# Patient Record
Sex: MALE | Race: WHITE | NOT HISPANIC OR LATINO | Employment: OTHER | URBAN - METROPOLITAN AREA
[De-identification: names, ages, dates, MRNs, and addresses within clinical notes are randomized per-mention and may not be internally consistent; named-entity substitution may affect disease eponyms.]

---

## 2017-04-03 ENCOUNTER — GENERIC CONVERSION - ENCOUNTER (OUTPATIENT)
Dept: OTHER | Facility: OTHER | Age: 52
End: 2017-04-03

## 2017-04-03 ENCOUNTER — APPOINTMENT (EMERGENCY)
Dept: RADIOLOGY | Facility: HOSPITAL | Age: 52
DRG: 251 | End: 2017-04-03
Payer: COMMERCIAL

## 2017-04-03 ENCOUNTER — HOSPITAL ENCOUNTER (INPATIENT)
Facility: HOSPITAL | Age: 52
LOS: 1 days | Discharge: HOME | DRG: 251 | End: 2017-04-06
Attending: EMERGENCY MEDICINE | Admitting: INTERNAL MEDICINE
Payer: COMMERCIAL

## 2017-04-03 DIAGNOSIS — R42 DIZZINESS: ICD-10-CM

## 2017-04-03 DIAGNOSIS — I49.8 BRUGADA SYNDROME: ICD-10-CM

## 2017-04-03 DIAGNOSIS — R06.00 DYSPNEA ON EXERTION: ICD-10-CM

## 2017-04-03 DIAGNOSIS — R06.02 SOB (SHORTNESS OF BREATH) ON EXERTION: Primary | ICD-10-CM

## 2017-04-03 LAB
ALBUMIN SERPL BCP-MCNC: 3.9 G/DL (ref 3.5–5)
ALP SERPL-CCNC: 78 U/L (ref 46–116)
ALT SERPL W P-5'-P-CCNC: 45 U/L (ref 12–78)
ANION GAP SERPL CALCULATED.3IONS-SCNC: 15 MMOL/L (ref 4–13)
APTT PPP: 29 SECONDS (ref 24–36)
AST SERPL W P-5'-P-CCNC: 33 U/L (ref 5–45)
ATRIAL RATE: 81 BPM
ATRIAL RATE: 82 BPM
BASOPHILS # BLD AUTO: 0.03 THOUSANDS/ΜL (ref 0–0.1)
BASOPHILS NFR BLD AUTO: 0 % (ref 0–1)
BILIRUB SERPL-MCNC: 0.6 MG/DL (ref 0.2–1)
BUN SERPL-MCNC: 12 MG/DL (ref 5–25)
CALCIUM SERPL-MCNC: 9.3 MG/DL (ref 8.3–10.1)
CHLORIDE SERPL-SCNC: 100 MMOL/L (ref 100–108)
CO2 SERPL-SCNC: 22 MMOL/L (ref 21–32)
CREAT SERPL-MCNC: 0.94 MG/DL (ref 0.6–1.3)
EOSINOPHIL # BLD AUTO: 0.09 THOUSAND/ΜL (ref 0–0.61)
EOSINOPHIL NFR BLD AUTO: 1 % (ref 0–6)
ERYTHROCYTE [DISTWIDTH] IN BLOOD BY AUTOMATED COUNT: 12.9 % (ref 11.6–15.1)
GFR SERPL CREATININE-BSD FRML MDRD: >60 ML/MIN/1.73SQ M
GLUCOSE SERPL-MCNC: 103 MG/DL (ref 65–140)
HCT VFR BLD AUTO: 41.2 % (ref 36.5–49.3)
HGB BLD-MCNC: 14.3 G/DL (ref 12–17)
INR PPP: 1.09 (ref 0.86–1.16)
LYMPHOCYTES # BLD AUTO: 2.18 THOUSANDS/ΜL (ref 0.6–4.47)
LYMPHOCYTES NFR BLD AUTO: 26 % (ref 14–44)
MAGNESIUM SERPL-MCNC: 1.9 MG/DL (ref 1.6–2.6)
MCH RBC QN AUTO: 29.2 PG (ref 26.8–34.3)
MCHC RBC AUTO-ENTMCNC: 34.7 G/DL (ref 31.4–37.4)
MCV RBC AUTO: 84 FL (ref 82–98)
MONOCYTES # BLD AUTO: 0.51 THOUSAND/ΜL (ref 0.17–1.22)
MONOCYTES NFR BLD AUTO: 6 % (ref 4–12)
NEUTROPHILS # BLD AUTO: 5.49 THOUSANDS/ΜL (ref 1.85–7.62)
NEUTS SEG NFR BLD AUTO: 67 % (ref 43–75)
NT-PROBNP SERPL-MCNC: 99 PG/ML
P AXIS: 64 DEGREES
P AXIS: 66 DEGREES
PLATELET # BLD AUTO: 351 THOUSANDS/UL (ref 149–390)
PMV BLD AUTO: 9.6 FL (ref 8.9–12.7)
POTASSIUM SERPL-SCNC: 3.8 MMOL/L (ref 3.5–5.3)
PR INTERVAL: 148 MS
PR INTERVAL: 150 MS
PROT SERPL-MCNC: 7.9 G/DL (ref 6.4–8.2)
PROTHROMBIN TIME: 13.9 SECONDS (ref 12–14.3)
QRS AXIS: 25 DEGREES
QRS AXIS: 36 DEGREES
QRSD INTERVAL: 164 MS
QRSD INTERVAL: 164 MS
QT INTERVAL: 398 MS
QT INTERVAL: 418 MS
QTC INTERVAL: 456 MS
QTC INTERVAL: 473 MS
RBC # BLD AUTO: 4.9 MILLION/UL (ref 3.88–5.62)
SODIUM SERPL-SCNC: 137 MMOL/L (ref 136–145)
T WAVE AXIS: -14 DEGREES
T WAVE AXIS: 10 DEGREES
TROPONIN I SERPL-MCNC: <0.02 NG/ML
TROPONIN I SERPL-MCNC: <0.02 NG/ML
TSH SERPL DL<=0.05 MIU/L-ACNC: 1.56 UIU/ML (ref 0.36–3.74)
VENTRICULAR RATE: 77 BPM
VENTRICULAR RATE: 79 BPM
WBC # BLD AUTO: 8.3 THOUSAND/UL (ref 4.31–10.16)

## 2017-04-03 PROCEDURE — 71010 HB CHEST X-RAY 1 VIEW FRONTAL (PORTABLE): CPT

## 2017-04-03 PROCEDURE — 83735 ASSAY OF MAGNESIUM: CPT | Performed by: EMERGENCY MEDICINE

## 2017-04-03 PROCEDURE — 85730 THROMBOPLASTIN TIME PARTIAL: CPT | Performed by: EMERGENCY MEDICINE

## 2017-04-03 PROCEDURE — 84443 ASSAY THYROID STIM HORMONE: CPT | Performed by: EMERGENCY MEDICINE

## 2017-04-03 PROCEDURE — 83880 ASSAY OF NATRIURETIC PEPTIDE: CPT | Performed by: EMERGENCY MEDICINE

## 2017-04-03 PROCEDURE — 93005 ELECTROCARDIOGRAM TRACING: CPT | Performed by: EMERGENCY MEDICINE

## 2017-04-03 PROCEDURE — 80053 COMPREHEN METABOLIC PANEL: CPT | Performed by: EMERGENCY MEDICINE

## 2017-04-03 PROCEDURE — 84484 ASSAY OF TROPONIN QUANT: CPT | Performed by: HOSPITALIST

## 2017-04-03 PROCEDURE — 93005 ELECTROCARDIOGRAM TRACING: CPT

## 2017-04-03 PROCEDURE — 99285 EMERGENCY DEPT VISIT HI MDM: CPT

## 2017-04-03 PROCEDURE — 36415 COLL VENOUS BLD VENIPUNCTURE: CPT | Performed by: EMERGENCY MEDICINE

## 2017-04-03 PROCEDURE — 85025 COMPLETE CBC W/AUTO DIFF WBC: CPT | Performed by: EMERGENCY MEDICINE

## 2017-04-03 PROCEDURE — 85610 PROTHROMBIN TIME: CPT | Performed by: EMERGENCY MEDICINE

## 2017-04-03 PROCEDURE — 84484 ASSAY OF TROPONIN QUANT: CPT | Performed by: EMERGENCY MEDICINE

## 2017-04-03 RX ORDER — LISINOPRIL 20 MG/1
20 TABLET ORAL DAILY
Status: DISCONTINUED | OUTPATIENT
Start: 2017-04-04 | End: 2017-04-05

## 2017-04-03 RX ORDER — ONDANSETRON 2 MG/ML
4 INJECTION INTRAMUSCULAR; INTRAVENOUS EVERY 6 HOURS PRN
Status: DISCONTINUED | OUTPATIENT
Start: 2017-04-03 | End: 2017-04-06 | Stop reason: HOSPADM

## 2017-04-04 ENCOUNTER — GENERIC CONVERSION - ENCOUNTER (OUTPATIENT)
Dept: OTHER | Facility: OTHER | Age: 52
End: 2017-04-04

## 2017-04-04 ENCOUNTER — APPOINTMENT (OUTPATIENT)
Dept: NON INVASIVE DIAGNOSTICS | Facility: HOSPITAL | Age: 52
DRG: 251 | End: 2017-04-04
Payer: COMMERCIAL

## 2017-04-04 LAB
ANION GAP SERPL CALCULATED.3IONS-SCNC: 12 MMOL/L (ref 4–13)
ATRIAL RATE: 66 BPM
BUN SERPL-MCNC: 16 MG/DL (ref 5–25)
CALCIUM SERPL-MCNC: 9.2 MG/DL (ref 8.3–10.1)
CHLORIDE SERPL-SCNC: 103 MMOL/L (ref 100–108)
CHOLEST SERPL-MCNC: 170 MG/DL (ref 50–200)
CO2 SERPL-SCNC: 23 MMOL/L (ref 21–32)
CREAT SERPL-MCNC: 1.04 MG/DL (ref 0.6–1.3)
ERYTHROCYTE [DISTWIDTH] IN BLOOD BY AUTOMATED COUNT: 13.1 % (ref 11.6–15.1)
EST. AVERAGE GLUCOSE BLD GHB EST-MCNC: 123 MG/DL
GFR SERPL CREATININE-BSD FRML MDRD: >60 ML/MIN/1.73SQ M
GLUCOSE P FAST SERPL-MCNC: 105 MG/DL (ref 65–99)
GLUCOSE SERPL-MCNC: 105 MG/DL (ref 65–140)
HBA1C MFR BLD: 5.9 % (ref 4.2–6.3)
HCT VFR BLD AUTO: 41.8 % (ref 36.5–49.3)
HDLC SERPL-MCNC: 33 MG/DL (ref 40–60)
HGB BLD-MCNC: 13.9 G/DL (ref 12–17)
LDLC SERPL CALC-MCNC: 113 MG/DL (ref 0–100)
MAGNESIUM SERPL-MCNC: 2.3 MG/DL (ref 1.6–2.6)
MCH RBC QN AUTO: 28.7 PG (ref 26.8–34.3)
MCHC RBC AUTO-ENTMCNC: 33.3 G/DL (ref 31.4–37.4)
MCV RBC AUTO: 86 FL (ref 82–98)
P AXIS: 43 DEGREES
PLATELET # BLD AUTO: 344 THOUSANDS/UL (ref 149–390)
PLATELET # BLD AUTO: 346 THOUSANDS/UL (ref 149–390)
PMV BLD AUTO: 9.5 FL (ref 8.9–12.7)
PMV BLD AUTO: 9.9 FL (ref 8.9–12.7)
POTASSIUM SERPL-SCNC: 4.1 MMOL/L (ref 3.5–5.3)
PR INTERVAL: 162 MS
QRS AXIS: 27 DEGREES
QRSD INTERVAL: 160 MS
QT INTERVAL: 448 MS
QTC INTERVAL: 469 MS
RBC # BLD AUTO: 4.84 MILLION/UL (ref 3.88–5.62)
SODIUM SERPL-SCNC: 138 MMOL/L (ref 136–145)
T WAVE AXIS: 22 DEGREES
TRIGL SERPL-MCNC: 118 MG/DL
TROPONIN I SERPL-MCNC: <0.02 NG/ML
VENTRICULAR RATE: 66 BPM
WBC # BLD AUTO: 7.17 THOUSAND/UL (ref 4.31–10.16)

## 2017-04-04 PROCEDURE — 84484 ASSAY OF TROPONIN QUANT: CPT | Performed by: HOSPITALIST

## 2017-04-04 PROCEDURE — 93005 ELECTROCARDIOGRAM TRACING: CPT | Performed by: HOSPITALIST

## 2017-04-04 PROCEDURE — A9270 NON-COVERED ITEM OR SERVICE: HCPCS | Performed by: HOSPITALIST

## 2017-04-04 PROCEDURE — 93306 TTE W/DOPPLER COMPLETE: CPT

## 2017-04-04 PROCEDURE — 80048 BASIC METABOLIC PNL TOTAL CA: CPT | Performed by: HOSPITALIST

## 2017-04-04 PROCEDURE — 83735 ASSAY OF MAGNESIUM: CPT | Performed by: HOSPITALIST

## 2017-04-04 PROCEDURE — 85049 AUTOMATED PLATELET COUNT: CPT | Performed by: HOSPITALIST

## 2017-04-04 PROCEDURE — 85027 COMPLETE CBC AUTOMATED: CPT | Performed by: HOSPITALIST

## 2017-04-04 PROCEDURE — A9270 NON-COVERED ITEM OR SERVICE: HCPCS | Performed by: INTERNAL MEDICINE

## 2017-04-04 PROCEDURE — 83036 HEMOGLOBIN GLYCOSYLATED A1C: CPT | Performed by: HOSPITALIST

## 2017-04-04 PROCEDURE — 80061 LIPID PANEL: CPT | Performed by: HOSPITALIST

## 2017-04-04 RX ORDER — ASPIRIN 81 MG/1
162 TABLET, CHEWABLE ORAL ONCE
Status: COMPLETED | OUTPATIENT
Start: 2017-04-04 | End: 2017-04-04

## 2017-04-04 RX ORDER — ASPIRIN 325 MG
325 TABLET ORAL DAILY
Status: DISCONTINUED | OUTPATIENT
Start: 2017-04-05 | End: 2017-04-05

## 2017-04-04 RX ADMIN — LISINOPRIL 20 MG: 20 TABLET ORAL at 08:09

## 2017-04-04 RX ADMIN — ENOXAPARIN SODIUM 40 MG: 40 INJECTION SUBCUTANEOUS at 08:09

## 2017-04-04 RX ADMIN — ASPIRIN 162 MG: 81 TABLET, CHEWABLE ORAL at 17:43

## 2017-04-05 ENCOUNTER — APPOINTMENT (OUTPATIENT)
Dept: NON INVASIVE DIAGNOSTICS | Facility: HOSPITAL | Age: 52
DRG: 251 | End: 2017-04-05
Payer: COMMERCIAL

## 2017-04-05 LAB
KCT BLD-ACNC: 240 SEC (ref 89–137)
SPECIMEN SOURCE: ABNORMAL

## 2017-04-05 PROCEDURE — C1769 GUIDE WIRE: HCPCS | Performed by: NURSE PRACTITIONER

## 2017-04-05 PROCEDURE — 4A023N7 MEASUREMENT OF CARDIAC SAMPLING AND PRESSURE, LEFT HEART, PERCUTANEOUS APPROACH: ICD-10-PCS | Performed by: INTERNAL MEDICINE

## 2017-04-05 PROCEDURE — C1887 CATHETER, GUIDING: HCPCS | Performed by: NURSE PRACTITIONER

## 2017-04-05 PROCEDURE — B2111ZZ FLUOROSCOPY OF MULTIPLE CORONARY ARTERIES USING LOW OSMOLAR CONTRAST: ICD-10-PCS | Performed by: INTERNAL MEDICINE

## 2017-04-05 PROCEDURE — 02703ZZ DILATION OF CORONARY ARTERY, ONE ARTERY, PERCUTANEOUS APPROACH: ICD-10-PCS | Performed by: INTERNAL MEDICINE

## 2017-04-05 PROCEDURE — A9270 NON-COVERED ITEM OR SERVICE: HCPCS | Performed by: INTERNAL MEDICINE

## 2017-04-05 PROCEDURE — 85347 COAGULATION TIME ACTIVATED: CPT

## 2017-04-05 PROCEDURE — 99153 MOD SED SAME PHYS/QHP EA: CPT | Performed by: NURSE PRACTITIONER

## 2017-04-05 PROCEDURE — C1894 INTRO/SHEATH, NON-LASER: HCPCS | Performed by: NURSE PRACTITIONER

## 2017-04-05 PROCEDURE — 93458 L HRT ARTERY/VENTRICLE ANGIO: CPT | Performed by: NURSE PRACTITIONER

## 2017-04-05 PROCEDURE — 99152 MOD SED SAME PHYS/QHP 5/>YRS: CPT | Performed by: NURSE PRACTITIONER

## 2017-04-05 PROCEDURE — A9270 NON-COVERED ITEM OR SERVICE: HCPCS | Performed by: NURSE PRACTITIONER

## 2017-04-05 RX ORDER — ASPIRIN 81 MG/1
81 TABLET, CHEWABLE ORAL DAILY
Status: DISCONTINUED | OUTPATIENT
Start: 2017-04-06 | End: 2017-04-06 | Stop reason: HOSPADM

## 2017-04-05 RX ORDER — METOPROLOL SUCCINATE 25 MG/1
25 TABLET, EXTENDED RELEASE ORAL DAILY
Status: DISCONTINUED | OUTPATIENT
Start: 2017-04-06 | End: 2017-04-06 | Stop reason: HOSPADM

## 2017-04-05 RX ORDER — MIDAZOLAM HYDROCHLORIDE 1 MG/ML
INJECTION INTRAMUSCULAR; INTRAVENOUS CODE/TRAUMA/SEDATION MEDICATION
Status: COMPLETED | OUTPATIENT
Start: 2017-04-05 | End: 2017-04-05

## 2017-04-05 RX ORDER — PRASUGREL 10 MG/1
TABLET, FILM COATED ORAL CODE/TRAUMA/SEDATION MEDICATION
Status: COMPLETED | OUTPATIENT
Start: 2017-04-05 | End: 2017-04-05

## 2017-04-05 RX ORDER — ATORVASTATIN CALCIUM 40 MG/1
40 TABLET, FILM COATED ORAL
Status: DISCONTINUED | OUTPATIENT
Start: 2017-04-05 | End: 2017-04-06 | Stop reason: HOSPADM

## 2017-04-05 RX ORDER — HEPARIN SODIUM 1000 [USP'U]/ML
INJECTION, SOLUTION INTRAVENOUS; SUBCUTANEOUS CODE/TRAUMA/SEDATION MEDICATION
Status: COMPLETED | OUTPATIENT
Start: 2017-04-05 | End: 2017-04-05

## 2017-04-05 RX ORDER — SODIUM CHLORIDE 9 MG/ML
75 INJECTION, SOLUTION INTRAVENOUS CONTINUOUS
Status: DISCONTINUED | OUTPATIENT
Start: 2017-04-05 | End: 2017-04-06

## 2017-04-05 RX ORDER — SODIUM CHLORIDE 9 MG/ML
INJECTION, SOLUTION INTRAVENOUS
Status: COMPLETED | OUTPATIENT
Start: 2017-04-05 | End: 2017-04-05

## 2017-04-05 RX ORDER — ASPIRIN 325 MG
TABLET, DELAYED RELEASE (ENTERIC COATED) ORAL CODE/TRAUMA/SEDATION MEDICATION
Status: COMPLETED | OUTPATIENT
Start: 2017-04-05 | End: 2017-04-05

## 2017-04-05 RX ORDER — LIDOCAINE HYDROCHLORIDE 10 MG/ML
INJECTION, SOLUTION INFILTRATION; PERINEURAL CODE/TRAUMA/SEDATION MEDICATION
Status: COMPLETED | OUTPATIENT
Start: 2017-04-05 | End: 2017-04-05

## 2017-04-05 RX ORDER — NITROGLYCERIN 20 MG/100ML
INJECTION INTRAVENOUS CODE/TRAUMA/SEDATION MEDICATION
Status: COMPLETED | OUTPATIENT
Start: 2017-04-05 | End: 2017-04-05

## 2017-04-05 RX ORDER — FENTANYL CITRATE 50 UG/ML
INJECTION, SOLUTION INTRAMUSCULAR; INTRAVENOUS CODE/TRAUMA/SEDATION MEDICATION
Status: COMPLETED | OUTPATIENT
Start: 2017-04-05 | End: 2017-04-05

## 2017-04-05 RX ORDER — LISINOPRIL 10 MG/1
10 TABLET ORAL DAILY
Status: DISCONTINUED | OUTPATIENT
Start: 2017-04-06 | End: 2017-04-06 | Stop reason: HOSPADM

## 2017-04-05 RX ORDER — VERAPAMIL HYDROCHLORIDE 2.5 MG/ML
INJECTION, SOLUTION INTRAVENOUS CODE/TRAUMA/SEDATION MEDICATION
Status: COMPLETED | OUTPATIENT
Start: 2017-04-05 | End: 2017-04-05

## 2017-04-05 RX ADMIN — VERAPAMIL HYDROCHLORIDE 2.5 MG: 2.5 INJECTION, SOLUTION INTRAVENOUS at 10:08

## 2017-04-05 RX ADMIN — FENTANYL CITRATE 50 MCG: 50 INJECTION, SOLUTION INTRAMUSCULAR; INTRAVENOUS at 10:37

## 2017-04-05 RX ADMIN — MIDAZOLAM HYDROCHLORIDE 2 MG: 1 INJECTION, SOLUTION INTRAMUSCULAR; INTRAVENOUS at 10:04

## 2017-04-05 RX ADMIN — MIDAZOLAM HYDROCHLORIDE 1 MG: 1 INJECTION, SOLUTION INTRAMUSCULAR; INTRAVENOUS at 10:35

## 2017-04-05 RX ADMIN — ATORVASTATIN CALCIUM 40 MG: 40 TABLET, FILM COATED ORAL at 18:08

## 2017-04-05 RX ADMIN — NITROGLYCERIN 1 INCH: 20 OINTMENT TOPICAL at 10:22

## 2017-04-05 RX ADMIN — ASPIRIN 324 MG: 325 TABLET, DELAYED RELEASE ORAL at 09:56

## 2017-04-05 RX ADMIN — NITROGLYCERIN 200 MCG: 20 INJECTION INTRAVENOUS at 10:08

## 2017-04-05 RX ADMIN — FENTANYL CITRATE 50 MCG: 50 INJECTION, SOLUTION INTRAMUSCULAR; INTRAVENOUS at 10:03

## 2017-04-05 RX ADMIN — LIDOCAINE HYDROCHLORIDE 1 ML: 10 INJECTION, SOLUTION INFILTRATION; PERINEURAL at 10:06

## 2017-04-05 RX ADMIN — HEPARIN SODIUM 4000 UNITS: 1000 INJECTION INTRAVENOUS; SUBCUTANEOUS at 10:08

## 2017-04-05 RX ADMIN — IOHEXOL 100 ML: 350 INJECTION, SOLUTION INTRAVENOUS at 10:25

## 2017-04-05 RX ADMIN — HEPARIN SODIUM 6000 UNITS: 1000 INJECTION INTRAVENOUS; SUBCUTANEOUS at 10:18

## 2017-04-05 RX ADMIN — SODIUM CHLORIDE 75 ML/HR: 0.9 INJECTION, SOLUTION INTRAVENOUS at 12:12

## 2017-04-05 RX ADMIN — IOHEXOL 50 ML: 350 INJECTION, SOLUTION INTRAVENOUS at 10:49

## 2017-04-05 RX ADMIN — SODIUM CHLORIDE 100 ML/HR: 0.9 INJECTION, SOLUTION INTRAVENOUS at 10:03

## 2017-04-05 RX ADMIN — PRASUGREL HYDROCHLORIDE 60 MG: 10 TABLET, FILM COATED ORAL at 09:57

## 2017-04-05 RX ADMIN — MIDAZOLAM HYDROCHLORIDE 1 MG: 1 INJECTION, SOLUTION INTRAMUSCULAR; INTRAVENOUS at 10:20

## 2017-04-05 RX ADMIN — FENTANYL CITRATE 50 MCG: 50 INJECTION, SOLUTION INTRAMUSCULAR; INTRAVENOUS at 10:20

## 2017-04-06 VITALS
SYSTOLIC BLOOD PRESSURE: 146 MMHG | DIASTOLIC BLOOD PRESSURE: 82 MMHG | OXYGEN SATURATION: 98 % | HEIGHT: 73 IN | WEIGHT: 215 LBS | BODY MASS INDEX: 28.49 KG/M2 | TEMPERATURE: 97.8 F | HEART RATE: 78 BPM | RESPIRATION RATE: 18 BRPM

## 2017-04-06 PROBLEM — I42.9 CARDIOMYOPATHY (HCC): Status: ACTIVE | Noted: 2017-04-06

## 2017-04-06 PROBLEM — I25.10 CORONARY ARTERY DISEASE INVOLVING NATIVE CORONARY ARTERY: Status: ACTIVE | Noted: 2017-04-06

## 2017-04-06 LAB
ANION GAP SERPL CALCULATED.3IONS-SCNC: 10 MMOL/L (ref 4–13)
BUN SERPL-MCNC: 21 MG/DL (ref 5–25)
CALCIUM SERPL-MCNC: 8.6 MG/DL (ref 8.3–10.1)
CHLORIDE SERPL-SCNC: 105 MMOL/L (ref 100–108)
CHOLEST SERPL-MCNC: 159 MG/DL (ref 50–200)
CO2 SERPL-SCNC: 23 MMOL/L (ref 21–32)
CREAT SERPL-MCNC: 0.97 MG/DL (ref 0.6–1.3)
ERYTHROCYTE [DISTWIDTH] IN BLOOD BY AUTOMATED COUNT: 13 % (ref 11.6–15.1)
GFR SERPL CREATININE-BSD FRML MDRD: >60 ML/MIN/1.73SQ M
GLUCOSE SERPL-MCNC: 97 MG/DL (ref 65–140)
HCT VFR BLD AUTO: 38.5 % (ref 36.5–49.3)
HDLC SERPL-MCNC: 32 MG/DL (ref 40–60)
HGB BLD-MCNC: 12.9 G/DL (ref 12–17)
LDLC SERPL CALC-MCNC: 107 MG/DL (ref 0–100)
MCH RBC QN AUTO: 28.8 PG (ref 26.8–34.3)
MCHC RBC AUTO-ENTMCNC: 33.5 G/DL (ref 31.4–37.4)
MCV RBC AUTO: 86 FL (ref 82–98)
PLATELET # BLD AUTO: 307 THOUSANDS/UL (ref 149–390)
PMV BLD AUTO: 9.5 FL (ref 8.9–12.7)
POTASSIUM SERPL-SCNC: 4.1 MMOL/L (ref 3.5–5.3)
RBC # BLD AUTO: 4.48 MILLION/UL (ref 3.88–5.62)
SODIUM SERPL-SCNC: 138 MMOL/L (ref 136–145)
TRIGL SERPL-MCNC: 102 MG/DL
WBC # BLD AUTO: 6.1 THOUSAND/UL (ref 4.31–10.16)

## 2017-04-06 PROCEDURE — A9270 NON-COVERED ITEM OR SERVICE: HCPCS | Performed by: NURSE PRACTITIONER

## 2017-04-06 PROCEDURE — A9270 NON-COVERED ITEM OR SERVICE: HCPCS | Performed by: INTERNAL MEDICINE

## 2017-04-06 PROCEDURE — 80061 LIPID PANEL: CPT | Performed by: NURSE PRACTITIONER

## 2017-04-06 PROCEDURE — 85027 COMPLETE CBC AUTOMATED: CPT | Performed by: NURSE PRACTITIONER

## 2017-04-06 PROCEDURE — 80048 BASIC METABOLIC PNL TOTAL CA: CPT | Performed by: NURSE PRACTITIONER

## 2017-04-06 RX ORDER — ASPIRIN 81 MG/1
81 TABLET, CHEWABLE ORAL DAILY
Qty: 30 TABLET | Refills: 0 | Status: SHIPPED | OUTPATIENT
Start: 2017-04-06 | End: 2018-06-25 | Stop reason: SDUPTHER

## 2017-04-06 RX ORDER — ISOSORBIDE MONONITRATE 30 MG/1
30 TABLET, EXTENDED RELEASE ORAL DAILY
Qty: 30 TABLET | Refills: 0 | Status: ON HOLD | OUTPATIENT
Start: 2017-04-06 | End: 2017-05-02

## 2017-04-06 RX ORDER — ISOSORBIDE MONONITRATE 30 MG/1
30 TABLET, EXTENDED RELEASE ORAL DAILY
Status: DISCONTINUED | OUTPATIENT
Start: 2017-04-06 | End: 2017-04-06 | Stop reason: HOSPADM

## 2017-04-06 RX ORDER — ATORVASTATIN CALCIUM 40 MG/1
40 TABLET, FILM COATED ORAL
Qty: 30 TABLET | Refills: 0 | Status: SHIPPED | OUTPATIENT
Start: 2017-04-06 | End: 2018-05-10 | Stop reason: SDUPTHER

## 2017-04-06 RX ORDER — LISINOPRIL 10 MG/1
10 TABLET ORAL DAILY
Qty: 30 TABLET | Refills: 0 | Status: ON HOLD | OUTPATIENT
Start: 2017-04-06 | End: 2017-05-02 | Stop reason: CLARIF

## 2017-04-06 RX ORDER — METOPROLOL SUCCINATE 25 MG/1
25 TABLET, EXTENDED RELEASE ORAL DAILY
Qty: 30 TABLET | Refills: 0 | Status: ON HOLD | OUTPATIENT
Start: 2017-04-06 | End: 2017-05-02

## 2017-04-06 RX ADMIN — ISOSORBIDE MONONITRATE 30 MG: 30 TABLET, EXTENDED RELEASE ORAL at 10:03

## 2017-04-06 RX ADMIN — METOPROLOL SUCCINATE 25 MG: 25 TABLET, FILM COATED, EXTENDED RELEASE ORAL at 10:03

## 2017-04-06 RX ADMIN — LISINOPRIL 10 MG: 10 TABLET ORAL at 10:02

## 2017-04-06 RX ADMIN — ASPIRIN 81 MG: 81 TABLET, CHEWABLE ORAL at 10:03

## 2017-05-01 ENCOUNTER — APPOINTMENT (EMERGENCY)
Dept: RADIOLOGY | Facility: HOSPITAL | Age: 52
End: 2017-05-01
Payer: COMMERCIAL

## 2017-05-01 ENCOUNTER — GENERIC CONVERSION - ENCOUNTER (OUTPATIENT)
Dept: OTHER | Facility: OTHER | Age: 52
End: 2017-05-01

## 2017-05-01 ENCOUNTER — HOSPITAL ENCOUNTER (OUTPATIENT)
Facility: HOSPITAL | Age: 52
Setting detail: OBSERVATION
Discharge: HOME | End: 2017-05-02
Attending: EMERGENCY MEDICINE | Admitting: INTERNAL MEDICINE
Payer: COMMERCIAL

## 2017-05-01 DIAGNOSIS — I25.110 CORONARY ARTERY DISEASE INVOLVING NATIVE CORONARY ARTERY OF NATIVE HEART WITH UNSTABLE ANGINA PECTORIS (HCC): Primary | ICD-10-CM

## 2017-05-01 DIAGNOSIS — R06.00 DYSPNEA ON EXERTION: ICD-10-CM

## 2017-05-01 PROBLEM — R07.9 CHEST PAIN: Status: ACTIVE | Noted: 2017-05-01

## 2017-05-01 LAB
ANION GAP SERPL CALCULATED.3IONS-SCNC: 8 MMOL/L (ref 4–13)
ATRIAL RATE: 60 BPM
ATRIAL RATE: 72 BPM
BASOPHILS # BLD AUTO: 0.04 THOUSANDS/ΜL (ref 0–0.1)
BASOPHILS NFR BLD AUTO: 1 % (ref 0–1)
BUN SERPL-MCNC: 15 MG/DL (ref 5–25)
CALCIUM SERPL-MCNC: 8.9 MG/DL (ref 8.3–10.1)
CHLORIDE SERPL-SCNC: 103 MMOL/L (ref 100–108)
CO2 SERPL-SCNC: 29 MMOL/L (ref 21–32)
CREAT SERPL-MCNC: 1.1 MG/DL (ref 0.6–1.3)
EOSINOPHIL # BLD AUTO: 0.21 THOUSAND/ΜL (ref 0–0.61)
EOSINOPHIL NFR BLD AUTO: 3 % (ref 0–6)
ERYTHROCYTE [DISTWIDTH] IN BLOOD BY AUTOMATED COUNT: 13.5 % (ref 11.6–15.1)
GFR SERPL CREATININE-BSD FRML MDRD: >60 ML/MIN/1.73SQ M
GLUCOSE SERPL-MCNC: 105 MG/DL (ref 65–140)
HCT VFR BLD AUTO: 40 % (ref 36.5–49.3)
HGB BLD-MCNC: 13.6 G/DL (ref 12–17)
LYMPHOCYTES # BLD AUTO: 1.53 THOUSANDS/ΜL (ref 0.6–4.47)
LYMPHOCYTES NFR BLD AUTO: 25 % (ref 14–44)
MCH RBC QN AUTO: 29.1 PG (ref 26.8–34.3)
MCHC RBC AUTO-ENTMCNC: 34 G/DL (ref 31.4–37.4)
MCV RBC AUTO: 86 FL (ref 82–98)
MONOCYTES # BLD AUTO: 0.56 THOUSAND/ΜL (ref 0.17–1.22)
MONOCYTES NFR BLD AUTO: 9 % (ref 4–12)
NEUTROPHILS # BLD AUTO: 3.77 THOUSANDS/ΜL (ref 1.85–7.62)
NEUTS SEG NFR BLD AUTO: 62 % (ref 43–75)
P AXIS: 43 DEGREES
P AXIS: 50 DEGREES
PLATELET # BLD AUTO: 167 THOUSANDS/UL (ref 149–390)
PLATELET # BLD AUTO: 176 THOUSANDS/UL (ref 149–390)
PMV BLD AUTO: 10.1 FL (ref 8.9–12.7)
PMV BLD AUTO: 9.9 FL (ref 8.9–12.7)
POTASSIUM SERPL-SCNC: 4.1 MMOL/L (ref 3.5–5.3)
PR INTERVAL: 152 MS
PR INTERVAL: 154 MS
QRS AXIS: 1 DEGREES
QRS AXIS: 24 DEGREES
QRSD INTERVAL: 160 MS
QRSD INTERVAL: 170 MS
QT INTERVAL: 410 MS
QT INTERVAL: 460 MS
QTC INTERVAL: 448 MS
QTC INTERVAL: 460 MS
RBC # BLD AUTO: 4.67 MILLION/UL (ref 3.88–5.62)
SODIUM SERPL-SCNC: 140 MMOL/L (ref 136–145)
T WAVE AXIS: -11 DEGREES
T WAVE AXIS: -11 DEGREES
TROPONIN I SERPL-MCNC: <0.02 NG/ML
VENTRICULAR RATE: 60 BPM
VENTRICULAR RATE: 72 BPM
WBC # BLD AUTO: 6.11 THOUSAND/UL (ref 4.31–10.16)

## 2017-05-01 PROCEDURE — A9270 NON-COVERED ITEM OR SERVICE: HCPCS | Performed by: NURSE PRACTITIONER

## 2017-05-01 PROCEDURE — 80048 BASIC METABOLIC PNL TOTAL CA: CPT

## 2017-05-01 PROCEDURE — 85025 COMPLETE CBC W/AUTO DIFF WBC: CPT | Performed by: EMERGENCY MEDICINE

## 2017-05-01 PROCEDURE — 99285 EMERGENCY DEPT VISIT HI MDM: CPT

## 2017-05-01 PROCEDURE — 36415 COLL VENOUS BLD VENIPUNCTURE: CPT

## 2017-05-01 PROCEDURE — 84484 ASSAY OF TROPONIN QUANT: CPT | Performed by: NURSE PRACTITIONER

## 2017-05-01 PROCEDURE — 84484 ASSAY OF TROPONIN QUANT: CPT | Performed by: EMERGENCY MEDICINE

## 2017-05-01 PROCEDURE — 85049 AUTOMATED PLATELET COUNT: CPT | Performed by: INTERNAL MEDICINE

## 2017-05-01 PROCEDURE — 93005 ELECTROCARDIOGRAM TRACING: CPT | Performed by: EMERGENCY MEDICINE

## 2017-05-01 PROCEDURE — 71020 HB CHEST X-RAY 2VW FRONTAL&LATL: CPT

## 2017-05-01 PROCEDURE — A9270 NON-COVERED ITEM OR SERVICE: HCPCS | Performed by: EMERGENCY MEDICINE

## 2017-05-01 RX ORDER — MORPHINE SULFATE 2 MG/ML
2 INJECTION, SOLUTION INTRAMUSCULAR; INTRAVENOUS EVERY 4 HOURS PRN
Status: DISCONTINUED | OUTPATIENT
Start: 2017-05-01 | End: 2017-05-02 | Stop reason: HOSPADM

## 2017-05-01 RX ORDER — OXYCODONE HYDROCHLORIDE 5 MG/1
5 TABLET ORAL EVERY 4 HOURS PRN
Status: DISCONTINUED | OUTPATIENT
Start: 2017-05-01 | End: 2017-05-02 | Stop reason: HOSPADM

## 2017-05-01 RX ORDER — ONDANSETRON 2 MG/ML
4 INJECTION INTRAMUSCULAR; INTRAVENOUS EVERY 6 HOURS PRN
Status: DISCONTINUED | OUTPATIENT
Start: 2017-05-01 | End: 2017-05-02 | Stop reason: HOSPADM

## 2017-05-01 RX ORDER — NITROGLYCERIN 0.4 MG/1
0.4 TABLET SUBLINGUAL
Status: DISCONTINUED | OUTPATIENT
Start: 2017-05-01 | End: 2017-05-02 | Stop reason: HOSPADM

## 2017-05-01 RX ORDER — ASPIRIN 81 MG/1
81 TABLET, CHEWABLE ORAL DAILY
Status: DISCONTINUED | OUTPATIENT
Start: 2017-05-02 | End: 2017-05-02 | Stop reason: HOSPADM

## 2017-05-01 RX ORDER — FUROSEMIDE 20 MG/1
20 TABLET ORAL DAILY
Status: DISCONTINUED | OUTPATIENT
Start: 2017-05-01 | End: 2017-05-02 | Stop reason: HOSPADM

## 2017-05-01 RX ORDER — LISINOPRIL 5 MG/1
5 TABLET ORAL DAILY
Status: DISCONTINUED | OUTPATIENT
Start: 2017-05-02 | End: 2017-05-02 | Stop reason: HOSPADM

## 2017-05-01 RX ORDER — ISOSORBIDE MONONITRATE 60 MG/1
60 TABLET, EXTENDED RELEASE ORAL EVERY MORNING
Status: DISCONTINUED | OUTPATIENT
Start: 2017-05-02 | End: 2017-05-02 | Stop reason: HOSPADM

## 2017-05-01 RX ORDER — ATORVASTATIN CALCIUM 40 MG/1
40 TABLET, FILM COATED ORAL
Status: DISCONTINUED | OUTPATIENT
Start: 2017-05-01 | End: 2017-05-02 | Stop reason: HOSPADM

## 2017-05-01 RX ORDER — FUROSEMIDE 20 MG/1
20 TABLET ORAL DAILY
COMMUNITY
End: 2018-06-19 | Stop reason: SDUPTHER

## 2017-05-01 RX ORDER — ACETAMINOPHEN 325 MG/1
650 TABLET ORAL EVERY 6 HOURS PRN
Status: DISCONTINUED | OUTPATIENT
Start: 2017-05-01 | End: 2017-05-02 | Stop reason: HOSPADM

## 2017-05-01 RX ORDER — METOPROLOL SUCCINATE 25 MG/1
25 TABLET, EXTENDED RELEASE ORAL 2 TIMES DAILY
Status: DISCONTINUED | OUTPATIENT
Start: 2017-05-01 | End: 2017-05-02 | Stop reason: HOSPADM

## 2017-05-01 RX ORDER — NITROGLYCERIN 0.4 MG/1
0.4 TABLET SUBLINGUAL ONCE
Status: DISCONTINUED | OUTPATIENT
Start: 2017-05-01 | End: 2017-05-01

## 2017-05-01 RX ORDER — ISOSORBIDE MONONITRATE 30 MG/1
30 TABLET, EXTENDED RELEASE ORAL EVERY EVENING
Status: DISCONTINUED | OUTPATIENT
Start: 2017-05-01 | End: 2017-05-02 | Stop reason: HOSPADM

## 2017-05-01 RX ORDER — ASPIRIN 81 MG/1
324 TABLET, CHEWABLE ORAL ONCE
Status: COMPLETED | OUTPATIENT
Start: 2017-05-01 | End: 2017-05-01

## 2017-05-01 RX ADMIN — ASPIRIN 324 MG: 81 TABLET, CHEWABLE ORAL at 12:34

## 2017-05-01 RX ADMIN — FUROSEMIDE 20 MG: 20 TABLET ORAL at 15:31

## 2017-05-01 RX ADMIN — ATORVASTATIN CALCIUM 40 MG: 40 TABLET, FILM COATED ORAL at 17:26

## 2017-05-01 RX ADMIN — METOPROLOL SUCCINATE 25 MG: 25 TABLET, FILM COATED, EXTENDED RELEASE ORAL at 17:26

## 2017-05-01 RX ADMIN — ISOSORBIDE MONONITRATE 30 MG: 30 TABLET, EXTENDED RELEASE ORAL at 17:26

## 2017-05-02 VITALS
HEART RATE: 68 BPM | DIASTOLIC BLOOD PRESSURE: 67 MMHG | BODY MASS INDEX: 27.14 KG/M2 | TEMPERATURE: 98.4 F | WEIGHT: 204.81 LBS | RESPIRATION RATE: 18 BRPM | HEIGHT: 73 IN | SYSTOLIC BLOOD PRESSURE: 104 MMHG | OXYGEN SATURATION: 93 %

## 2017-05-02 PROCEDURE — A9270 NON-COVERED ITEM OR SERVICE: HCPCS | Performed by: NURSE PRACTITIONER

## 2017-05-02 RX ORDER — METOPROLOL SUCCINATE 25 MG/1
25 TABLET, EXTENDED RELEASE ORAL 2 TIMES DAILY
Qty: 60 TABLET | Refills: 0 | Status: SHIPPED | OUTPATIENT
Start: 2017-05-02 | End: 2018-06-25 | Stop reason: SDUPTHER

## 2017-05-02 RX ORDER — ISOSORBIDE MONONITRATE 30 MG/1
TABLET, EXTENDED RELEASE ORAL
Qty: 90 TABLET | Refills: 0 | Status: SHIPPED | OUTPATIENT
Start: 2017-05-02 | End: 2018-04-18 | Stop reason: DRUGHIGH

## 2017-05-02 RX ORDER — LISINOPRIL 5 MG/1
5 TABLET ORAL DAILY
COMMUNITY
End: 2018-06-25 | Stop reason: SDUPTHER

## 2017-05-02 RX ADMIN — ISOSORBIDE MONONITRATE 60 MG: 60 TABLET, FILM COATED, EXTENDED RELEASE ORAL at 08:57

## 2017-05-02 RX ADMIN — ASPIRIN 81 MG: 81 TABLET, CHEWABLE ORAL at 08:54

## 2017-05-02 RX ADMIN — METOPROLOL SUCCINATE 25 MG: 25 TABLET, FILM COATED, EXTENDED RELEASE ORAL at 08:55

## 2017-05-02 RX ADMIN — LISINOPRIL 5 MG: 5 TABLET ORAL at 08:54

## 2017-05-02 RX ADMIN — FUROSEMIDE 20 MG: 20 TABLET ORAL at 08:54

## 2017-05-10 ENCOUNTER — GENERIC CONVERSION - ENCOUNTER (OUTPATIENT)
Dept: OTHER | Facility: OTHER | Age: 52
End: 2017-05-10

## 2017-05-18 ENCOUNTER — ALLSCRIPTS OFFICE VISIT (OUTPATIENT)
Dept: OTHER | Facility: OTHER | Age: 52
End: 2017-05-18

## 2017-05-18 ENCOUNTER — TRANSCRIBE ORDERS (OUTPATIENT)
Dept: ADMINISTRATIVE | Facility: HOSPITAL | Age: 52
End: 2017-05-18

## 2017-05-18 DIAGNOSIS — I42.9 CARDIOMYOPATHY (HCC): ICD-10-CM

## 2017-05-18 DIAGNOSIS — Z95.810 PRESENCE OF AUTOMATIC IMPLANTABLE CARDIOVERTER-DEFIBRILLATOR: ICD-10-CM

## 2017-05-18 DIAGNOSIS — E78.5 HYPERLIPIDEMIA: ICD-10-CM

## 2017-05-18 DIAGNOSIS — I49.8 OTHER SPECIFIED CARDIAC ARRHYTHMIAS: ICD-10-CM

## 2017-05-18 DIAGNOSIS — I25.10 ATHEROSCLEROSIS OF NATIVE CORONARY ARTERY OF NATIVE HEART WITHOUT ANGINA PECTORIS: Primary | ICD-10-CM

## 2017-05-18 DIAGNOSIS — I25.10 ATHEROSCLEROTIC HEART DISEASE OF NATIVE CORONARY ARTERY WITHOUT ANGINA PECTORIS: ICD-10-CM

## 2017-05-23 ENCOUNTER — GENERIC CONVERSION - ENCOUNTER (OUTPATIENT)
Dept: OTHER | Facility: OTHER | Age: 52
End: 2017-05-23

## 2017-05-23 LAB
A/G RATIO (HISTORICAL): 1.8 (ref 1.2–2.2)
ALBUMIN SERPL BCP-MCNC: 4.7 G/DL (ref 3.5–5.5)
ALP SERPL-CCNC: 81 IU/L (ref 39–117)
ALT SERPL W P-5'-P-CCNC: 28 IU/L (ref 0–44)
AMBIG ABBREV CMP14 DEFAULT (HISTORICAL): NORMAL
AST SERPL W P-5'-P-CCNC: 27 IU/L (ref 0–40)
BILIRUB SERPL-MCNC: 0.7 MG/DL (ref 0–1.2)
BUN SERPL-MCNC: 13 MG/DL (ref 6–24)
BUN/CREA RATIO (HISTORICAL): 14 (ref 9–20)
CALCIUM SERPL-MCNC: 9.7 MG/DL (ref 8.7–10.2)
CHLORIDE SERPL-SCNC: 100 MMOL/L (ref 96–106)
CO2 SERPL-SCNC: 22 MMOL/L (ref 18–29)
CREAT SERPL-MCNC: 0.95 MG/DL (ref 0.76–1.27)
DEPRECATED RDW RBC AUTO: 14.2 % (ref 12.3–15.4)
EGFR AFRICAN AMERICAN (HISTORICAL): 104 ML/MIN/1.73
EGFR-AMERICAN CALC (HISTORICAL): 90 ML/MIN/1.73
GLUCOSE SERPL-MCNC: 112 MG/DL (ref 65–99)
HCT VFR BLD AUTO: 41.8 % (ref 37.5–51)
HGB BLD-MCNC: 14.6 G/DL (ref 12.6–17.7)
MCH RBC QN AUTO: 29.5 PG (ref 26.6–33)
MCHC RBC AUTO-ENTMCNC: 34.9 G/DL (ref 31.5–35.7)
MCV RBC AUTO: 84 FL (ref 79–97)
POTASSIUM SERPL-SCNC: 4.1 MMOL/L (ref 3.5–5.2)
RBC (HISTORICAL): 4.95 X10E6/UL (ref 4.14–5.8)
SODIUM SERPL-SCNC: 139 MMOL/L (ref 134–144)
TOT. GLOBULIN, SERUM (HISTORICAL): 2.6 G/DL (ref 1.5–4.5)
TOTAL PROTEIN (HISTORICAL): 7.3 G/DL (ref 6–8.5)
WBC # BLD AUTO: 5.7 X10E3/UL (ref 3.4–10.8)

## 2017-05-24 LAB
T4 FREE SERPL-MCNC: 1.35 NG/DL (ref 0.82–1.77)
TSH SERPL DL<=0.05 MIU/L-ACNC: 1.5 UIU/ML (ref 0.45–4.5)
VIT B12 SERPL-MCNC: 240 PG/ML (ref 211–946)

## 2017-05-25 ENCOUNTER — ALLSCRIPTS OFFICE VISIT (OUTPATIENT)
Dept: OTHER | Facility: OTHER | Age: 52
End: 2017-05-25

## 2017-05-26 ENCOUNTER — HOSPITAL ENCOUNTER (OUTPATIENT)
Dept: NON INVASIVE DIAGNOSTICS | Facility: HOSPITAL | Age: 52
Discharge: HOME | End: 2017-05-26
Payer: COMMERCIAL

## 2017-05-26 ENCOUNTER — GENERIC CONVERSION - ENCOUNTER (OUTPATIENT)
Dept: OTHER | Facility: OTHER | Age: 52
End: 2017-05-26

## 2017-05-26 DIAGNOSIS — I25.10 ATHEROSCLEROTIC HEART DISEASE OF NATIVE CORONARY ARTERY WITHOUT ANGINA PECTORIS: ICD-10-CM

## 2017-05-26 DIAGNOSIS — I49.8 OTHER SPECIFIED CARDIAC DYSRHYTHMIAS(427.89): ICD-10-CM

## 2017-05-26 DIAGNOSIS — Z95.810 PRESENCE OF AUTOMATIC IMPLANTABLE CARDIOVERTER-DEFIBRILLATOR: ICD-10-CM

## 2017-05-26 DIAGNOSIS — E78.5 HYPERLIPIDEMIA: ICD-10-CM

## 2017-05-26 DIAGNOSIS — I42.9 CARDIOMYOPATHY (HCC): ICD-10-CM

## 2017-05-26 PROCEDURE — 93226 XTRNL ECG REC<48 HR SCAN A/R: CPT

## 2017-05-26 PROCEDURE — 93306 TTE W/DOPPLER COMPLETE: CPT

## 2017-05-26 PROCEDURE — 93225 XTRNL ECG REC<48 HRS REC: CPT

## 2017-06-07 ENCOUNTER — ALLSCRIPTS OFFICE VISIT (OUTPATIENT)
Dept: OTHER | Facility: OTHER | Age: 52
End: 2017-06-07

## 2017-06-14 ENCOUNTER — HOSPITAL ENCOUNTER (OUTPATIENT)
Facility: HOSPITAL | Age: 52
Setting detail: OBSERVATION
Discharge: HOME | End: 2017-06-15
Attending: EMERGENCY MEDICINE | Admitting: INTERNAL MEDICINE
Payer: COMMERCIAL

## 2017-06-14 DIAGNOSIS — I25.5 ISCHEMIC CARDIOMYOPATHY: ICD-10-CM

## 2017-06-14 DIAGNOSIS — R07.9 CHEST PAIN AT REST: Primary | ICD-10-CM

## 2017-06-14 DIAGNOSIS — I49.8 BRUGADA SYNDROME: ICD-10-CM

## 2017-06-14 DIAGNOSIS — Z95.810 ICD (IMPLANTABLE CARDIOVERTER-DEFIBRILLATOR) IN PLACE: ICD-10-CM

## 2017-06-14 DIAGNOSIS — I25.110 CORONARY ARTERY DISEASE INVOLVING NATIVE CORONARY ARTERY OF NATIVE HEART WITH UNSTABLE ANGINA PECTORIS (HCC): ICD-10-CM

## 2017-06-14 LAB
ANION GAP SERPL CALCULATED.3IONS-SCNC: 9 MMOL/L (ref 4–13)
BASOPHILS # BLD AUTO: 0.03 THOUSANDS/ΜL (ref 0–0.1)
BASOPHILS NFR BLD AUTO: 1 % (ref 0–1)
BUN SERPL-MCNC: 19 MG/DL (ref 5–25)
CALCIUM SERPL-MCNC: 9.2 MG/DL (ref 8.3–10.1)
CHLORIDE SERPL-SCNC: 103 MMOL/L (ref 100–108)
CO2 SERPL-SCNC: 26 MMOL/L (ref 21–32)
CREAT SERPL-MCNC: 1.04 MG/DL (ref 0.6–1.3)
EOSINOPHIL # BLD AUTO: 0.17 THOUSAND/ΜL (ref 0–0.61)
EOSINOPHIL NFR BLD AUTO: 3 % (ref 0–6)
ERYTHROCYTE [DISTWIDTH] IN BLOOD BY AUTOMATED COUNT: 13 % (ref 11.6–15.1)
GFR SERPL CREATININE-BSD FRML MDRD: >60 ML/MIN/1.73SQ M
GLUCOSE SERPL-MCNC: 113 MG/DL (ref 65–140)
HCT VFR BLD AUTO: 38.7 % (ref 36.5–49.3)
HGB BLD-MCNC: 13.5 G/DL (ref 12–17)
LYMPHOCYTES # BLD AUTO: 2.24 THOUSANDS/ΜL (ref 0.6–4.47)
LYMPHOCYTES NFR BLD AUTO: 38 % (ref 14–44)
MCH RBC QN AUTO: 29.5 PG (ref 26.8–34.3)
MCHC RBC AUTO-ENTMCNC: 34.9 G/DL (ref 31.4–37.4)
MCV RBC AUTO: 85 FL (ref 82–98)
MONOCYTES # BLD AUTO: 0.45 THOUSAND/ΜL (ref 0.17–1.22)
MONOCYTES NFR BLD AUTO: 8 % (ref 4–12)
NEUTROPHILS # BLD AUTO: 3.02 THOUSANDS/ΜL (ref 1.85–7.62)
NEUTS SEG NFR BLD AUTO: 50 % (ref 43–75)
NT-PROBNP SERPL-MCNC: 129 PG/ML
PLATELET # BLD AUTO: 192 THOUSANDS/UL (ref 149–390)
PLATELET # BLD AUTO: 195 THOUSANDS/UL (ref 149–390)
PMV BLD AUTO: 10.3 FL (ref 8.9–12.7)
PMV BLD AUTO: 9.9 FL (ref 8.9–12.7)
POTASSIUM SERPL-SCNC: 3.5 MMOL/L (ref 3.5–5.3)
RBC # BLD AUTO: 4.58 MILLION/UL (ref 3.88–5.62)
SODIUM SERPL-SCNC: 138 MMOL/L (ref 136–145)
TROPONIN I SERPL-MCNC: <0.02 NG/ML
TROPONIN I SERPL-MCNC: <0.02 NG/ML
WBC # BLD AUTO: 5.91 THOUSAND/UL (ref 4.31–10.16)

## 2017-06-14 PROCEDURE — 85049 AUTOMATED PLATELET COUNT: CPT | Performed by: PHYSICIAN ASSISTANT

## 2017-06-14 PROCEDURE — 85025 COMPLETE CBC W/AUTO DIFF WBC: CPT | Performed by: EMERGENCY MEDICINE

## 2017-06-14 PROCEDURE — 36415 COLL VENOUS BLD VENIPUNCTURE: CPT | Performed by: EMERGENCY MEDICINE

## 2017-06-14 PROCEDURE — 93005 ELECTROCARDIOGRAM TRACING: CPT | Performed by: EMERGENCY MEDICINE

## 2017-06-14 PROCEDURE — 80048 BASIC METABOLIC PNL TOTAL CA: CPT | Performed by: EMERGENCY MEDICINE

## 2017-06-14 PROCEDURE — 84484 ASSAY OF TROPONIN QUANT: CPT | Performed by: EMERGENCY MEDICINE

## 2017-06-14 PROCEDURE — 93005 ELECTROCARDIOGRAM TRACING: CPT | Performed by: PHYSICIAN ASSISTANT

## 2017-06-14 PROCEDURE — 83880 ASSAY OF NATRIURETIC PEPTIDE: CPT | Performed by: PHYSICIAN ASSISTANT

## 2017-06-14 PROCEDURE — 99285 EMERGENCY DEPT VISIT HI MDM: CPT

## 2017-06-14 PROCEDURE — 84484 ASSAY OF TROPONIN QUANT: CPT | Performed by: PHYSICIAN ASSISTANT

## 2017-06-14 PROCEDURE — 93005 ELECTROCARDIOGRAM TRACING: CPT

## 2017-06-14 RX ORDER — METOPROLOL SUCCINATE 25 MG/1
25 TABLET, EXTENDED RELEASE ORAL 2 TIMES DAILY
Status: DISCONTINUED | OUTPATIENT
Start: 2017-06-14 | End: 2017-06-15 | Stop reason: HOSPADM

## 2017-06-14 RX ORDER — ATORVASTATIN CALCIUM 40 MG/1
40 TABLET, FILM COATED ORAL
Status: DISCONTINUED | OUTPATIENT
Start: 2017-06-14 | End: 2017-06-15 | Stop reason: HOSPADM

## 2017-06-14 RX ORDER — ONDANSETRON 2 MG/ML
4 INJECTION INTRAMUSCULAR; INTRAVENOUS EVERY 6 HOURS PRN
Status: DISCONTINUED | OUTPATIENT
Start: 2017-06-14 | End: 2017-06-15 | Stop reason: HOSPADM

## 2017-06-14 RX ORDER — DOCUSATE SODIUM 100 MG/1
100 CAPSULE, LIQUID FILLED ORAL 2 TIMES DAILY
Status: DISCONTINUED | OUTPATIENT
Start: 2017-06-14 | End: 2017-06-15 | Stop reason: HOSPADM

## 2017-06-14 RX ORDER — ISOSORBIDE MONONITRATE 60 MG/1
60 TABLET, EXTENDED RELEASE ORAL
Status: DISCONTINUED | OUTPATIENT
Start: 2017-06-15 | End: 2017-06-15 | Stop reason: HOSPADM

## 2017-06-14 RX ORDER — HEPARIN SODIUM 5000 [USP'U]/ML
5000 INJECTION, SOLUTION INTRAVENOUS; SUBCUTANEOUS EVERY 8 HOURS SCHEDULED
Status: DISCONTINUED | OUTPATIENT
Start: 2017-06-14 | End: 2017-06-15 | Stop reason: HOSPADM

## 2017-06-14 RX ORDER — ASPIRIN 325 MG
325 TABLET ORAL ONCE
Status: COMPLETED | OUTPATIENT
Start: 2017-06-14 | End: 2017-06-14

## 2017-06-14 RX ORDER — ISOSORBIDE MONONITRATE 60 MG/1
60 TABLET, EXTENDED RELEASE ORAL
COMMUNITY
End: 2017-06-15 | Stop reason: HOSPADM

## 2017-06-14 RX ORDER — CLOPIDOGREL BISULFATE 75 MG/1
75 TABLET ORAL DAILY
COMMUNITY
End: 2018-05-10 | Stop reason: SDUPTHER

## 2017-06-14 RX ORDER — LISINOPRIL 5 MG/1
5 TABLET ORAL DAILY
Status: DISCONTINUED | OUTPATIENT
Start: 2017-06-15 | End: 2017-06-15 | Stop reason: HOSPADM

## 2017-06-14 RX ORDER — FUROSEMIDE 20 MG/1
20 TABLET ORAL DAILY
Status: DISCONTINUED | OUTPATIENT
Start: 2017-06-15 | End: 2017-06-15 | Stop reason: HOSPADM

## 2017-06-14 RX ORDER — ISOSORBIDE MONONITRATE 30 MG/1
30 TABLET, EXTENDED RELEASE ORAL
Status: DISCONTINUED | OUTPATIENT
Start: 2017-06-14 | End: 2017-06-15 | Stop reason: HOSPADM

## 2017-06-14 RX ORDER — CLOPIDOGREL BISULFATE 75 MG/1
75 TABLET ORAL DAILY
Status: DISCONTINUED | OUTPATIENT
Start: 2017-06-14 | End: 2017-06-15 | Stop reason: HOSPADM

## 2017-06-14 RX ORDER — ASPIRIN 81 MG/1
81 TABLET, CHEWABLE ORAL DAILY
Status: DISCONTINUED | OUTPATIENT
Start: 2017-06-15 | End: 2017-06-15 | Stop reason: HOSPADM

## 2017-06-14 RX ORDER — MORPHINE SULFATE 2 MG/ML
2 INJECTION, SOLUTION INTRAMUSCULAR; INTRAVENOUS EVERY 6 HOURS PRN
Status: DISCONTINUED | OUTPATIENT
Start: 2017-06-14 | End: 2017-06-15 | Stop reason: HOSPADM

## 2017-06-14 RX ORDER — MAGNESIUM HYDROXIDE/ALUMINUM HYDROXICE/SIMETHICONE 120; 1200; 1200 MG/30ML; MG/30ML; MG/30ML
30 SUSPENSION ORAL EVERY 6 HOURS PRN
Status: DISCONTINUED | OUTPATIENT
Start: 2017-06-14 | End: 2017-06-15 | Stop reason: HOSPADM

## 2017-06-14 RX ORDER — ACETAMINOPHEN 325 MG/1
650 TABLET ORAL EVERY 4 HOURS PRN
Status: DISCONTINUED | OUTPATIENT
Start: 2017-06-14 | End: 2017-06-15 | Stop reason: HOSPADM

## 2017-06-14 RX ADMIN — ATORVASTATIN CALCIUM 40 MG: 40 TABLET, FILM COATED ORAL at 21:07

## 2017-06-14 RX ADMIN — ISOSORBIDE MONONITRATE 30 MG: 30 TABLET, EXTENDED RELEASE ORAL at 21:07

## 2017-06-14 RX ADMIN — METOPROLOL SUCCINATE 25 MG: 25 TABLET, EXTENDED RELEASE ORAL at 21:08

## 2017-06-14 RX ADMIN — HEPARIN SODIUM 5000 UNITS: 5000 INJECTION, SOLUTION INTRAVENOUS; SUBCUTANEOUS at 21:08

## 2017-06-14 RX ADMIN — ASPIRIN 325 MG: 325 TABLET ORAL at 19:17

## 2017-06-14 RX ADMIN — CLOPIDOGREL BISULFATE 75 MG: 75 TABLET ORAL at 21:08

## 2017-06-14 RX ADMIN — DOCUSATE SODIUM 100 MG: 100 CAPSULE, LIQUID FILLED ORAL at 21:08

## 2017-06-15 ENCOUNTER — GENERIC CONVERSION - ENCOUNTER (OUTPATIENT)
Dept: OTHER | Facility: OTHER | Age: 52
End: 2017-06-15

## 2017-06-15 VITALS
HEART RATE: 66 BPM | TEMPERATURE: 98.1 F | SYSTOLIC BLOOD PRESSURE: 114 MMHG | WEIGHT: 211.42 LBS | OXYGEN SATURATION: 95 % | RESPIRATION RATE: 18 BRPM | DIASTOLIC BLOOD PRESSURE: 69 MMHG | BODY MASS INDEX: 27.89 KG/M2

## 2017-06-15 LAB
ALBUMIN SERPL BCP-MCNC: 3.9 G/DL (ref 3.5–5)
ALP SERPL-CCNC: 72 U/L (ref 46–116)
ALT SERPL W P-5'-P-CCNC: 33 U/L (ref 12–78)
ANION GAP SERPL CALCULATED.3IONS-SCNC: 9 MMOL/L (ref 4–13)
AST SERPL W P-5'-P-CCNC: 25 U/L (ref 5–45)
ATRIAL RATE: 54 BPM
ATRIAL RATE: 56 BPM
ATRIAL RATE: 57 BPM
ATRIAL RATE: 61 BPM
ATRIAL RATE: 61 BPM
BILIRUB SERPL-MCNC: 0.9 MG/DL (ref 0.2–1)
BUN SERPL-MCNC: 19 MG/DL (ref 5–25)
CALCIUM SERPL-MCNC: 9 MG/DL (ref 8.3–10.1)
CHLORIDE SERPL-SCNC: 103 MMOL/L (ref 100–108)
CHOLEST SERPL-MCNC: 107 MG/DL (ref 50–200)
CO2 SERPL-SCNC: 25 MMOL/L (ref 21–32)
CREAT SERPL-MCNC: 1.03 MG/DL (ref 0.6–1.3)
ERYTHROCYTE [DISTWIDTH] IN BLOOD BY AUTOMATED COUNT: 13.2 % (ref 11.6–15.1)
GFR SERPL CREATININE-BSD FRML MDRD: >60 ML/MIN/1.73SQ M
GLUCOSE P FAST SERPL-MCNC: 105 MG/DL (ref 65–99)
GLUCOSE SERPL-MCNC: 105 MG/DL (ref 65–140)
HCT VFR BLD AUTO: 39.3 % (ref 36.5–49.3)
HDLC SERPL-MCNC: 33 MG/DL (ref 40–60)
HGB BLD-MCNC: 13.4 G/DL (ref 12–17)
LDLC SERPL CALC-MCNC: 61 MG/DL (ref 0–100)
MAGNESIUM SERPL-MCNC: 2 MG/DL (ref 1.6–2.6)
MCH RBC QN AUTO: 29.2 PG (ref 26.8–34.3)
MCHC RBC AUTO-ENTMCNC: 34.1 G/DL (ref 31.4–37.4)
MCV RBC AUTO: 86 FL (ref 82–98)
P AXIS: 43 DEGREES
P AXIS: 51 DEGREES
P AXIS: 59 DEGREES
P AXIS: 75 DEGREES
P AXIS: 78 DEGREES
PLATELET # BLD AUTO: 178 THOUSANDS/UL (ref 149–390)
PMV BLD AUTO: 10 FL (ref 8.9–12.7)
POTASSIUM SERPL-SCNC: 3.9 MMOL/L (ref 3.5–5.3)
PR INTERVAL: 154 MS
PR INTERVAL: 158 MS
PR INTERVAL: 162 MS
PR INTERVAL: 168 MS
PR INTERVAL: 168 MS
PROT SERPL-MCNC: 7.3 G/DL (ref 6.4–8.2)
QRS AXIS: 14 DEGREES
QRS AXIS: 18 DEGREES
QRS AXIS: 19 DEGREES
QRS AXIS: 44 DEGREES
QRS AXIS: 63 DEGREES
QRSD INTERVAL: 164 MS
QRSD INTERVAL: 168 MS
QRSD INTERVAL: 172 MS
QRSD INTERVAL: 172 MS
QRSD INTERVAL: 178 MS
QT INTERVAL: 454 MS
QT INTERVAL: 456 MS
QT INTERVAL: 466 MS
QT INTERVAL: 476 MS
QT INTERVAL: 482 MS
QTC INTERVAL: 438 MS
QTC INTERVAL: 451 MS
QTC INTERVAL: 459 MS
QTC INTERVAL: 469 MS
QTC INTERVAL: 469 MS
RBC # BLD AUTO: 4.59 MILLION/UL (ref 3.88–5.62)
SODIUM SERPL-SCNC: 137 MMOL/L (ref 136–145)
T WAVE AXIS: -25 DEGREES
T WAVE AXIS: -9 DEGREES
T WAVE AXIS: 12 DEGREES
T WAVE AXIS: 53 DEGREES
T WAVE AXIS: 59 DEGREES
TRIGL SERPL-MCNC: 63 MG/DL
TROPONIN I SERPL-MCNC: <0.02 NG/ML
TROPONIN I SERPL-MCNC: <0.02 NG/ML
VENTRICULAR RATE: 54 BPM
VENTRICULAR RATE: 56 BPM
VENTRICULAR RATE: 57 BPM
VENTRICULAR RATE: 61 BPM
VENTRICULAR RATE: 61 BPM
WBC # BLD AUTO: 5.28 THOUSAND/UL (ref 4.31–10.16)

## 2017-06-15 PROCEDURE — 93005 ELECTROCARDIOGRAM TRACING: CPT

## 2017-06-15 PROCEDURE — 80061 LIPID PANEL: CPT | Performed by: PHYSICIAN ASSISTANT

## 2017-06-15 PROCEDURE — 84484 ASSAY OF TROPONIN QUANT: CPT | Performed by: PHYSICIAN ASSISTANT

## 2017-06-15 PROCEDURE — 80053 COMPREHEN METABOLIC PANEL: CPT | Performed by: PHYSICIAN ASSISTANT

## 2017-06-15 PROCEDURE — 85027 COMPLETE CBC AUTOMATED: CPT | Performed by: PHYSICIAN ASSISTANT

## 2017-06-15 PROCEDURE — 93005 ELECTROCARDIOGRAM TRACING: CPT | Performed by: PHYSICIAN ASSISTANT

## 2017-06-15 PROCEDURE — 83735 ASSAY OF MAGNESIUM: CPT | Performed by: PHYSICIAN ASSISTANT

## 2017-06-15 RX ORDER — NITROGLYCERIN 0.4 MG/1
0.4 TABLET SUBLINGUAL
Qty: 90 TABLET | Refills: 0 | Status: SHIPPED | OUTPATIENT
Start: 2017-06-15 | End: 2018-06-25 | Stop reason: SDUPTHER

## 2017-06-15 RX ORDER — NITROGLYCERIN 0.4 MG/1
0.4 TABLET SUBLINGUAL
Status: DISCONTINUED | OUTPATIENT
Start: 2017-06-15 | End: 2017-06-15 | Stop reason: HOSPADM

## 2017-06-15 RX ADMIN — METOPROLOL SUCCINATE 25 MG: 25 TABLET, EXTENDED RELEASE ORAL at 09:08

## 2017-06-15 RX ADMIN — LISINOPRIL 5 MG: 5 TABLET ORAL at 09:07

## 2017-06-15 RX ADMIN — ISOSORBIDE MONONITRATE 60 MG: 60 TABLET, EXTENDED RELEASE ORAL at 09:07

## 2017-06-15 RX ADMIN — CLOPIDOGREL BISULFATE 75 MG: 75 TABLET ORAL at 09:08

## 2017-06-15 RX ADMIN — FUROSEMIDE 20 MG: 20 TABLET ORAL at 09:08

## 2017-06-15 RX ADMIN — ASPIRIN 81 MG 81 MG: 81 TABLET ORAL at 09:08

## 2017-06-15 RX ADMIN — HEPARIN SODIUM 5000 UNITS: 5000 INJECTION, SOLUTION INTRAVENOUS; SUBCUTANEOUS at 05:00

## 2017-06-19 ENCOUNTER — ALLSCRIPTS OFFICE VISIT (OUTPATIENT)
Dept: OTHER | Facility: OTHER | Age: 52
End: 2017-06-19

## 2017-06-24 ENCOUNTER — APPOINTMENT (EMERGENCY)
Dept: ULTRASOUND IMAGING | Facility: HOSPITAL | Age: 52
End: 2017-06-24
Payer: COMMERCIAL

## 2017-06-24 ENCOUNTER — GENERIC CONVERSION - ENCOUNTER (OUTPATIENT)
Dept: OTHER | Facility: OTHER | Age: 52
End: 2017-06-24

## 2017-06-24 ENCOUNTER — HOSPITAL ENCOUNTER (EMERGENCY)
Facility: HOSPITAL | Age: 52
Discharge: HOME | End: 2017-06-24
Admitting: EMERGENCY MEDICINE
Payer: COMMERCIAL

## 2017-06-24 VITALS
RESPIRATION RATE: 16 BRPM | OXYGEN SATURATION: 97 % | BODY MASS INDEX: 29.69 KG/M2 | HEART RATE: 61 BPM | DIASTOLIC BLOOD PRESSURE: 78 MMHG | TEMPERATURE: 98.3 F | SYSTOLIC BLOOD PRESSURE: 115 MMHG | HEIGHT: 73 IN | WEIGHT: 224 LBS

## 2017-06-24 DIAGNOSIS — Z98.890 STATUS POST CARDIAC CATHETERIZATION: Primary | ICD-10-CM

## 2017-06-24 DIAGNOSIS — R10.32 LT INGUINAL PAIN: ICD-10-CM

## 2017-06-24 LAB
ANION GAP SERPL CALCULATED.3IONS-SCNC: 9 MMOL/L (ref 4–13)
BASOPHILS # BLD AUTO: 0.05 THOUSANDS/ΜL (ref 0–0.1)
BASOPHILS NFR BLD AUTO: 1 % (ref 0–1)
BUN SERPL-MCNC: 16 MG/DL (ref 5–25)
CALCIUM SERPL-MCNC: 8.9 MG/DL (ref 8.3–10.1)
CHLORIDE SERPL-SCNC: 102 MMOL/L (ref 100–108)
CO2 SERPL-SCNC: 26 MMOL/L (ref 21–32)
CREAT SERPL-MCNC: 0.94 MG/DL (ref 0.6–1.3)
EOSINOPHIL # BLD AUTO: 0.15 THOUSAND/ΜL (ref 0–0.61)
EOSINOPHIL NFR BLD AUTO: 2 % (ref 0–6)
ERYTHROCYTE [DISTWIDTH] IN BLOOD BY AUTOMATED COUNT: 13.2 % (ref 11.6–15.1)
GFR SERPL CREATININE-BSD FRML MDRD: >60 ML/MIN/1.73SQ M
GLUCOSE SERPL-MCNC: 101 MG/DL (ref 65–140)
HCT VFR BLD AUTO: 37.8 % (ref 36.5–49.3)
HGB BLD-MCNC: 13.1 G/DL (ref 12–17)
LYMPHOCYTES # BLD AUTO: 1.39 THOUSANDS/ΜL (ref 0.6–4.47)
LYMPHOCYTES NFR BLD AUTO: 21 % (ref 14–44)
MCH RBC QN AUTO: 29.8 PG (ref 26.8–34.3)
MCHC RBC AUTO-ENTMCNC: 34.7 G/DL (ref 31.4–37.4)
MCV RBC AUTO: 86 FL (ref 82–98)
MONOCYTES # BLD AUTO: 0.67 THOUSAND/ΜL (ref 0.17–1.22)
MONOCYTES NFR BLD AUTO: 10 % (ref 4–12)
NEUTROPHILS # BLD AUTO: 4.41 THOUSANDS/ΜL (ref 1.85–7.62)
NEUTS SEG NFR BLD AUTO: 66 % (ref 43–75)
PLATELET # BLD AUTO: 166 THOUSANDS/UL (ref 149–390)
PMV BLD AUTO: 10.2 FL (ref 8.9–12.7)
POTASSIUM SERPL-SCNC: 4 MMOL/L (ref 3.5–5.3)
RBC # BLD AUTO: 4.39 MILLION/UL (ref 3.88–5.62)
SODIUM SERPL-SCNC: 137 MMOL/L (ref 136–145)
WBC # BLD AUTO: 6.67 THOUSAND/UL (ref 4.31–10.16)

## 2017-06-24 PROCEDURE — 93926 LOWER EXTREMITY STUDY: CPT

## 2017-06-24 PROCEDURE — 93005 ELECTROCARDIOGRAM TRACING: CPT

## 2017-06-24 PROCEDURE — 80048 BASIC METABOLIC PNL TOTAL CA: CPT | Performed by: PHYSICIAN ASSISTANT

## 2017-06-24 PROCEDURE — 36415 COLL VENOUS BLD VENIPUNCTURE: CPT | Performed by: PHYSICIAN ASSISTANT

## 2017-06-24 PROCEDURE — 85025 COMPLETE CBC W/AUTO DIFF WBC: CPT | Performed by: PHYSICIAN ASSISTANT

## 2017-06-24 PROCEDURE — 99285 EMERGENCY DEPT VISIT HI MDM: CPT

## 2017-06-25 LAB
ATRIAL RATE: 84 BPM
P AXIS: 81 DEGREES
PR INTERVAL: 150 MS
QRS AXIS: 27 DEGREES
QRSD INTERVAL: 156 MS
QT INTERVAL: 386 MS
QTC INTERVAL: 450 MS
T WAVE AXIS: 34 DEGREES
VENTRICULAR RATE: 82 BPM

## 2017-07-26 ENCOUNTER — ALLSCRIPTS OFFICE VISIT (OUTPATIENT)
Dept: OTHER | Facility: OTHER | Age: 52
End: 2017-07-26

## 2017-09-07 DIAGNOSIS — E78.5 HYPERLIPIDEMIA: ICD-10-CM

## 2017-09-07 DIAGNOSIS — I25.10 ATHEROSCLEROTIC HEART DISEASE OF NATIVE CORONARY ARTERY WITHOUT ANGINA PECTORIS: ICD-10-CM

## 2017-09-07 DIAGNOSIS — I42.9 CARDIOMYOPATHY (HCC): ICD-10-CM

## 2017-09-07 DIAGNOSIS — R73.03 PREDIABETES: ICD-10-CM

## 2017-09-07 DIAGNOSIS — R73.01 IMPAIRED FASTING GLUCOSE: ICD-10-CM

## 2017-09-11 ENCOUNTER — ALLSCRIPTS OFFICE VISIT (OUTPATIENT)
Dept: OTHER | Facility: OTHER | Age: 52
End: 2017-09-11

## 2017-11-10 ENCOUNTER — ALLSCRIPTS OFFICE VISIT (OUTPATIENT)
Dept: OTHER | Facility: OTHER | Age: 52
End: 2017-11-10

## 2017-11-11 NOTE — PROGRESS NOTES
Assessment  Assessed    1  CAD (coronary artery disease) (414 00) (I25 10)   2  Benign essential hypertension (401 1) (I10)   3  Cardiomyopathy (425 4) (I42 9)   4  Brugada syndrome (746 89) (I49 8)   5  ICD (implantable cardioverter-defibrillator) in place (V45 02) (Z95 810)    Plan  Benign essential hypertension, Brugada syndrome, CAD (coronary artery disease),Cardiomyopathy, ICD (implantable cardioverter-defibrillator) in place    · Follow-up visit in 6 months Evaluation and Treatment  Follow-up  Status: Complete Done: 59XZW2512   Ordered;Benign essential hypertension, Brugada syndrome, CAD (coronary artery disease), Cardiomyopathy, ICD (implantable cardioverter-defibrillator) in place; Ordered By: Zuleyka Ferreira Performed:  Due: 11TJS7375; Last Updated By: Nick Vasquez; 11/10/2017 9:12:24 AM    Discussion/Summary  Cardiology Discussion Summary Free Text Note [facility]:   Coronary artery disease has been stable without anginal symptoms  Blood pressures been well-controlled  He did have follow-up blood work in the spring to assess lipid panel  Ejection fraction has improved to 50-55%  He does have a history of Brugada syndrome will follow AICD checks  I did recognize that he start a regular exercise program       Chief Complaint  Chief Complaint Free Text Note Form: Car Blair is here for a follow up and complaints of some SOB  He is also complaining of some coldness  History of Present Illness  Cardiology HPI Free Text Note Form [facility]: Mr Steven Adams This very pleasant 53-year-old gentleman with a history of Brugada's syndrome status post ICD, coronary artery disease with recent catheterization showing  of the marginal, hyperlipidemia, new diagnosis of cardiomyopathy who presents for post hospital follow-up visit  An initial attempt was made to open the vessel time a catheterization but the attempt was aborted due to unsuccessful wiring   He was admitted to the hospital again with episodes of chest pain I uptitrated his medications and is here today for follow-up visit  He continues to have some complaints of chest discomfort and left arm discomfort  This is not there all the time but he feels that this is limiting him in terms of physical of any  He's also had other unusual complaints including chills and generalized fatigue  He did have a viral syndrome at February which may have contributed to some of the reduction in ejection fraction  He is been on appropriate medical therapy for cardiomyopathy and a return he has an ICD implanted from his history of Brugada's  He denies any palpitations, lightheadedness, dizziness, or syncope  There's been no lower extremity edema, PND, orthopnea  He's been taking all medications as prescribed  her last visit he underwent PCI of the chronically occluded circumflex  The patient tells me immediately following the procedure he noticed a difference in how he felt in terms of his breathing and chest discomfort  His physical activity level has significantly improved  He did have some bleeding at the left femoral puncture site ultrasound was negative for pseudoaneurysm and this has healed L  He denies any further chest pain, shortness of breath, palpitations  Ejection fraction has improved up to 50%  presents today for routine scheduled follow-up appointment  From a coronary artery disease standpoint he's been doing well with no exertional chest pain or dyspnea  He has had some generalized fatigue and feels that his exercise tolerance is not what it used to be but much better following the coronary intervention  He has no exertional chest pressure heaviness reading has been optimal  He denies any lower extremity edema, PND, orthopnea  He's been taking all medications as prescribed  He's working at least 3 jobs but feels that his stress levels manageable  Review of Systems  Cardiology Male ROS:    Cardiac: No complaints of chest pain, no palpitations, no fainiting  Skin: No complaints of nonhealing sores or skin rash  Genitourinary: No complaints of recurrent urinary tract infections, frequent urination at night, difficult urination, blood in urine, kidney stones, loss of bladder control, no kidney or prostate problems, no erectile dysfunction  Psychological: No complaints of feeling depressed, anxiety, panic attacks, or difficulty concentrating  General: No complaints of trouble sleeping, lack of energy, fatigue, appetite changes, weight changes, fever, frequent infections, or night sweats  Respiratory: No complaints of shortness of breath, cough with sputum, or wheezing  HEENT: No complaints of serious problems, hearing problems, nose problems, throat problems, or snoring  Gastrointestinal: No complaints of liver problems, nausea, vomiting, heartburn, constipation, bloody stools, diarrhea, problems swallowing, adbominal pain, or rectal bleeding  Hematologic: No complaints of bleeding disorders, anemia, blood clots, or excessive brusing  Neurological: No complaints of numbness, tingling, dizziness, weakness, seizures, headaches, syncope or fainting, AM fatigue, daytime sleepiness, no witnessed apnea episodes  Musculoskeletal: No complaints of arthritis, back pain, or painfull swelling  Active Problems  Problems    1  Abnormal fasting glucose (790 29) (R73 01)   2  Benign essential hypertension (401 1) (I10)   3  Brugada syndrome (746 89) (I49 8)   4  CAD (coronary artery disease) (414 00) (I25 10)   5  Cardiomyopathy (425 4) (I42 9)   6  Colon cancer screening (V76 51) (Z12 11)   7  Hyperlipidemia (272 4) (E78 5)   8  ICD (implantable cardioverter-defibrillator) in place (V45 02) (Z95 810)   9  Prediabetes (790 29) (R73 03)    Past Medical History  Active Problems And Past Medical History Reviewed: The active problems and past medical history were reviewed and updated today  Surgical History  Surgical History Reviewed:    The surgical history was reviewed and updated today  Family History  Mother    1  Family history of cardiac disorder (V17 49) (Z82 49)   2  Denied: Family history of depression   3  Denied: Family history of substance abuse  Father    4  Denied: Family history of depression   5  Denied: Family history of substance abuse  Sibling    6  Denied: Family history of depression   7  Denied: Family history of substance abuse  Family History Reviewed: The family history was reviewed and updated today  Social History  Problems    · Former smoker (K46 73) (W97 388)  Social History Reviewed: The social history was reviewed and updated today  Current Meds   1  Aspirin 81 MG Oral Tablet Delayed Release; TAKE 1 TABLET DAILY; Therapy: (Recorded:93Vqk5547) to Recorded   2  Atorvastatin Calcium 40 MG Oral Tablet; TAKE 1 TABLET DAILY  Requested for: 26YEP8952; Last Rx:91Cfl6950 Ordered   3  Clopidogrel Bisulfate 75 MG Oral Tablet; TAKE 1 TABLET DAILY; Therapy: 34PGC8478 to (Evaluate:30Jun2018)  Requested for: 32XJF8742; Last Rx:50Dhb5678 Ordered   4  Furosemide 20 MG Oral Tablet; TAKE 1 TABLET DAILY AS DIRECTED  Requested for: 09FDA7076; Last Rx:23Rta8259 Ordered   5  Isosorbide Mononitrate ER 60 MG Oral Tablet Extended Release 24 Hour; TAKE 1 TABLET IN THE AM, AND 1/2 TABLET IN THE PM  Requested for: 33RGV0987; Last Rx:53Zzw2474 Ordered   6  Lisinopril 5 MG Oral Tablet; TAKE 1 TABLET DAILY  Requested for: 55GJB4287; Last Rx:67Uqb9317 Ordered   7  Metoprolol Succinate ER 25 MG Oral Tablet Extended Release 24 Hour; Take 1 tablet twice daily; Therapy: 51UQG4527 to (Evaluate:30Jun2018)  Requested for: 97PMJ9809; Last Rx:85Zxj1489 Ordered   8  Nitroglycerin 0 4 MG Sublingual Tablet Sublingual; DISSOLVE 1 TABLET UNDER THE TONGUE AS NEEDED FOR CHEST PAIN; Therapy: 87MCK3147 to (Evaluate:28Ktc4397)  Requested for: 45YMI2951; Last Rx:70Cmi4050 Ordered  Medication List Reviewed: The medication list was reviewed and updated today  Allergies  Medication    1  No Known Drug Allergies    Vitals  Vital Signs    Recorded: 53NIQ2212 08:49AM   Heart Rate 74   Systolic 307, LUE, Sitting   Diastolic 78, LUE, Sitting   Height 6 ft 1 in   Weight 221 lb 6 oz   BMI Calculated 29 21   BSA Calculated 2 25   O2 Saturation 98       Physical Exam   Constitutional  General appearance: No acute distress, well appearing and well nourished  Eyes  Conjunctiva and Sclera examination: Conjunctiva pink, sclera anicteric  Ears, Nose, Mouth, and Throat - Oropharynx: Clear, nares are clear, mucous membranes are moist   Neck  Neck and thyroid: Normal, supple, trachea midline, no thyromegaly  Pulmonary  Respiratory effort: No increased work of breathing or signs of respiratory distress  Auscultation of lungs: Clear to auscultation, no rales, no rhonchi, no wheezing, good air movement  Cardiovascular  Auscultation of heart: Normal rate and rhythm, normal S1 and S2, no murmurs  Carotid pulses: Normal, 2+ bilaterally  Peripheral vascular exam: Normal pulses throughout, no tenderness, erythema or swelling  Pedal pulses: Normal, 2+ bilaterally  Examination of extremities for edema and/or varicosities: Normal    Abdomen  Abdomen: Non-tender and no distention  Liver and spleen: No hepatomegaly or splenomegaly  Musculoskeletal Gait and station: Normal gait  -- Digits and nails: Normal without clubbing or cyanosis  -- Inspection/palpation of joints, bones, and muscles: Normal, ROM normal    Skin - Skin and subcutaneous tissue: Normal without rashes or lesions  Skin is warm and well perfused, normal turgor  Neurologic - Cranial nerves: II - XII intact  -- Speech: Normal    Psychiatric - Orientation to person, place, and time: Normal -- Mood and affect: Normal       Future Appointments    Date/Time Provider Specialty Site   06/20/2018 09:00 AM Cardiology, [address] [facility]   11/28/2017 02:30 PM Cardiology, Device Remote   [address]   03/12/2018 08:00 AM Nikkie Wright MD Family Medicine [address]        Signatures   Electronically signed by : Yuni Sena DO; Nov 10 2017 10:12AM EST                       (Author)

## 2017-12-11 ENCOUNTER — GENERIC CONVERSION - ENCOUNTER (OUTPATIENT)
Dept: OTHER | Facility: OTHER | Age: 52
End: 2017-12-11

## 2018-01-08 ENCOUNTER — ALLSCRIPTS OFFICE VISIT (OUTPATIENT)
Dept: OTHER | Facility: OTHER | Age: 53
End: 2018-01-08

## 2018-01-08 ENCOUNTER — TRANSCRIBE ORDERS (OUTPATIENT)
Dept: ADMINISTRATIVE | Facility: HOSPITAL | Age: 53
End: 2018-01-08

## 2018-01-08 DIAGNOSIS — Z95.810 PRESENCE OF AUTOMATIC IMPLANTABLE CARDIOVERTER-DEFIBRILLATOR: ICD-10-CM

## 2018-01-08 DIAGNOSIS — I10 ESSENTIAL (PRIMARY) HYPERTENSION: ICD-10-CM

## 2018-01-08 DIAGNOSIS — I42.9 CARDIOMYOPATHY (HCC): ICD-10-CM

## 2018-01-08 DIAGNOSIS — I49.8 OTHER SPECIFIED CARDIAC ARRHYTHMIAS (CODE): ICD-10-CM

## 2018-01-08 DIAGNOSIS — I42.9 FAMILIAL CARDIOMYOPATHY (HCC): Primary | ICD-10-CM

## 2018-01-08 DIAGNOSIS — I25.10 ATHEROSCLEROTIC HEART DISEASE OF NATIVE CORONARY ARTERY WITHOUT ANGINA PECTORIS: ICD-10-CM

## 2018-01-08 DIAGNOSIS — R06.02 SHORTNESS OF BREATH: ICD-10-CM

## 2018-01-08 DIAGNOSIS — E78.5 HYPERLIPIDEMIA: ICD-10-CM

## 2018-01-09 NOTE — PROGRESS NOTES
Assessment   Assessed    1  Shortness of breath on exertion (786 05) (R06 02)   2  Brugada syndrome (746 89) (I49 8)   3  Benign essential hypertension (401 1) (I10)   4  CAD (coronary artery disease) (414 00) (I25 10)   5  Cardiomyopathy (425 4) (I42 9)   6  ICD (implantable cardioverter-defibrillator) in place (V45 02) (Z95 810)   7  Hyperlipidemia (272 4) (E78 5)    Plan   Benign essential hypertension    · EKG/ECG- POC; Status:Complete;   Done: 49QKH2960   Perform: In Office; FEE:75NQV6091; Last Updated By:Joann Lynch; 1/8/2018 3:56:31 PM;Ordered; For:Benign essential hypertension; Ordered By:Yogi Quintero;  Benign essential hypertension, Brugada syndrome, CAD (coronary artery disease),    Cardiomyopathy, Hyperlipidemia, ICD (implantable cardioverter-defibrillator) in place,    Shortness of breath on exertion    · (1) NT- BNP (PRO BRAIN NATRIURETIC PEPTIDE); Status:Active; Requested    FGQ:07IQV6277; Perform:[facility]; BBO:41FVJ8195;INQTYQO; For:Benign essential hypertension, Brugada syndrome, CAD (coronary artery disease), Cardiomyopathy, Hyperlipidemia, ICD (implantable cardioverter-defibrillator) in place, Shortness of breath on exertion; Ordered By:Yogi Quintero;   · * XR CHEST PA & LATERAL; Status:Active; Requested RVK:[id number]; Perform:[facility]; JFM:48BJH5563;VXCOSPT; For:Benign essential hypertension, Brugada syndrome, CAD (coronary artery disease), Cardiomyopathy, Hyperlipidemia, ICD (implantable cardioverter-defibrillator) in place, Shortness of breath on exertion; Ordered By:Yogi Quintero;   · NM MYOCARDIAL PERFUSION SPECT (RX STRESS AND/OR REST); Status:Need    Information - Financial Authorization; Requested JRB:[id number]; Perform:[facility]; POR:[id number]; Last Updated Dietrich Romberg; 1/8/2018 4:24:31 PM;Ordered; For:Benign essential hypertension, Brugada syndrome, CAD (coronary artery disease), Cardiomyopathy, Hyperlipidemia, ICD (implantable cardioverter-defibrillator) in place, Shortness of breath on exertion; Ordered By:Yogi Quintero;   · Follow-up visit in 3 months Evaluation and Treatment  Follow-up  Status: Complete     Done: 76VRL0881   Ordered; For: Benign essential hypertension, Brugada syndrome, CAD (coronary artery disease), Cardiomyopathy, Hyperlipidemia, ICD (implantable cardioverter-defibrillator) in place, Shortness of breath on exertion; Ordered By: Mallika Blanchard Performed:  Due: 94JUS3851; Last Updated By: Manoj Lester; 1/8/2018 4:24:31 PM    Discussion/Summary   Cardiology Discussion Summary Free Text Note [facility]:    He has had some exertional dyspnea similar to prior to opening the  of the circ  I have ordered a Deaynira Armani to assess for any ischemia in the lateral wall  Also ordered a chest x-ray to assess dyspnea  He is also due for device interrogation will get that done the next week  Chief Complaint   Chief Complaint Free Text Note Form: Patient is here for a follow up  Patient complaints of SOB  History of Present Illness   Cardiology HPI Free Text Note Form [facility]: Mr Her Mealing This very pleasant 49-year-old gentleman with a history of Brugada's syndrome status post ICD, coronary artery disease with recent catheterization showing  of the marginal, hyperlipidemia, new diagnosis of cardiomyopathy who presents for post hospital follow-up visit  An initial attempt was made to open the vessel time a catheterization but the attempt was aborted due to unsuccessful wiring  He was admitted to the hospital again with episodes of chest pain I uptitrated his medications and is here today for follow-up visit  He continues to have some complaints of chest discomfort and left arm discomfort  This is not there all the time but he feels that this is limiting him in terms of physical of any  He's also had other unusual complaints including chills and generalized fatigue   He did have a viral syndrome at February which may have contributed to some of the reduction in ejection fraction  He is been on appropriate medical therapy for cardiomyopathy and a return he has an ICD implanted from his history of Brugada's  He denies any palpitations, lightheadedness, dizziness, or syncope  There's been no lower extremity edema, PND, orthopnea  He's been taking all medications as prescribed  her last visit he underwent PCI of the chronically occluded circumflex  The patient tells me immediately following the procedure he noticed a difference in how he felt in terms of his breathing and chest discomfort  His physical activity level has significantly improved  He did have some bleeding at the left femoral puncture site ultrasound was negative for pseudoaneurysm and this has healed L  He denies any further chest pain, shortness of breath, palpitations  Ejection fraction has improved up to 50%   presents today for routine scheduled follow-up appointment  From a coronary artery disease standpoint he's been doing well with no exertional chest pain or dyspnea  He has had some generalized fatigue and feels that his exercise tolerance is not what it used to be but much better following the coronary intervention  He has no exertional chest pressure heaviness reading has been optimal  He denies any lower extremity edema, PND, orthopnea  He's been taking all medications as prescribed  He's working at least 3 jobs but feels that his stress levels manageable  presents for an acute visit due to shortness of breath  He tells me that he feels dyspnea with only mild activity  A similar to before he had his stent placed  He denies any anginal sounding chest he did fever chills  There has been no cough  He denies any lower extremity edema, PND, orthopnea  He has been taking his medications as prescribed  Review of Systems   Cardiology Male ROS:         Cardiac: No complaints of chest pain, no palpitations, no fainiting  Skin: No complaints of nonhealing sores or skin rash  Genitourinary: No complaints of recurrent urinary tract infections, frequent urination at night, difficult urination, blood in urine, kidney stones, loss of bladder control, no kidney or prostate problems, no erectile dysfunction  Psychological: No complaints of feeling depressed, anxiety, panic attacks, or difficulty concentrating  General: No complaints of trouble sleeping, lack of energy, fatigue, appetite changes, weight changes, fever, frequent infections, or night sweats  Respiratory: shortness of breath  HEENT: No complaints of serious problems, hearing problems, nose problems, throat problems, or snoring  Gastrointestinal: No complaints of liver problems, nausea, vomiting, heartburn, constipation, bloody stools, diarrhea, problems swallowing, adbominal pain, or rectal bleeding  Hematologic: No complaints of bleeding disorders, anemia, blood clots, or excessive brusing  Neurological: No complaints of numbness, tingling, dizziness, weakness, seizures, headaches, syncope or fainting, AM fatigue, daytime sleepiness, no witnessed apnea episodes  Musculoskeletal: No complaints of arthritis, back pain, or painfull swelling  Active Problems   Problems    1  Abnormal fasting glucose (790 29) (R73 01)   2  Acute frontal sinusitis (461 1) (J01 10)   3  Benign essential hypertension (401 1) (I10)   4  Brugada syndrome (746 89) (I49 8)   5  CAD (coronary artery disease) (414 00) (I25 10)   6  Cardiomyopathy (425 4) (I42 9)   7  Colon cancer screening (V76 51) (Z12 11)   8  Hyperlipidemia (272 4) (E78 5)   9  ICD (implantable cardioverter-defibrillator) in place (V45 02) (Z95 810)   10  Prediabetes (790 29) (R73 03)    Past Medical History   Active Problems And Past Medical History Reviewed: The active problems and past medical history were reviewed and updated today        Surgical History   Surgical History Reviewed: The surgical history was reviewed and updated today  Family History   Mother    1  Family history of cardiac disorder (V17 49) (Z82 49)   2  Denied: Family history of depression   3  Denied: Family history of substance abuse  Father    4  Denied: Family history of depression   5  Denied: Family history of substance abuse  Sibling    6  Denied: Family history of depression   7  Denied: Family history of substance abuse  Family History Reviewed: The family history was reviewed and updated today  Social History   Problems    · Former smoker (L16 10) (I57 961)  Social History Reviewed: The social history was reviewed and updated today  Current Meds    1  Aspirin 81 MG Oral Tablet Delayed Release; TAKE 1 TABLET DAILY; Therapy: (Recorded:65Ajh2355) to Recorded   2  Atorvastatin Calcium 40 MG Oral Tablet; TAKE 1 TABLET DAILY  Requested for:     37WMP0020; Last Rx:44Afr8354 Ordered   3  Clopidogrel Bisulfate 75 MG Oral Tablet; TAKE 1 TABLET DAILY; Therapy: 16FFW9721 to (Evaluate:30Jun2018)  Requested for: 56DZA6551; Last     Rx:12Gem1335 Ordered   4  Furosemide 20 MG Oral Tablet; TAKE 1 TABLET DAILY AS DIRECTED  Requested for:     84FBK1571; Last Rx:94Glp5159 Ordered   5  Isosorbide Mononitrate ER 60 MG Oral Tablet Extended Release 24 Hour; TAKE 1     TABLET IN THE AM, AND 1/2 TABLET IN THE PM  Requested for: 64MHU7586; Last     Rx:42Whd0478 Ordered   6  Lisinopril 5 MG Oral Tablet; TAKE 1 TABLET DAILY  Requested for: 44YJL2153; Last     Rx:95Sti5162 Ordered   7  Metoprolol Succinate ER 25 MG Oral Tablet Extended Release 24 Hour; Take 1 tablet     twice daily; Therapy: 39AEP4932 to (Evaluate:30Jun2018)  Requested for: 97DXV3704; Last     Rx:05Vor5169 Ordered   8   Nitroglycerin 0 4 MG Sublingual Tablet Sublingual; DISSOLVE 1 TABLET UNDER THE     TONGUE AS NEEDED FOR CHEST PAIN;     Therapy: 24AQE7706 to (Evaluate:03Zky1006)  Requested for: 01OGQ0753; Last     Rx:15Jun2017 Ordered    Allergies   Medication    1  No Known Drug Allergies    Vitals   Vital Signs    Recorded: 42MHF5441 03:46PM   Heart Rate 69   Systolic 084, RUE, Sitting   Diastolic 72, RUE, Sitting   Height 6 ft 1 in   Weight 221 lb    BMI Calculated 29 16   BSA Calculated 2 25     Physical Exam        Constitutional      General appearance: No acute distress, well appearing and well nourished  Eyes      Conjunctiva and Sclera examination: Conjunctiva pink, sclera anicteric  Ears, Nose, Mouth, and Throat - Oropharynx: Clear, nares are clear, mucous membranes are moist       Neck      Neck and thyroid: Normal, supple, trachea midline, no thyromegaly  Pulmonary      Respiratory effort: No increased work of breathing or signs of respiratory distress  Auscultation of lungs: Clear to auscultation, no rales, no rhonchi, no wheezing, good air movement  Cardiovascular      Auscultation of heart: Normal rate and rhythm, normal S1 and S2, no murmurs  Carotid pulses: Normal, 2+ bilaterally  Peripheral vascular exam: Normal pulses throughout, no tenderness, erythema or swelling  Pedal pulses: Normal, 2+ bilaterally  Examination of extremities for edema and/or varicosities: Normal        Abdomen      Abdomen: Non-tender and no distention  Liver and spleen: No hepatomegaly or splenomegaly  Musculoskeletal Gait and station: Normal gait  -- Digits and nails: Normal without clubbing or cyanosis  -- Inspection/palpation of joints, bones, and muscles: Normal, ROM normal        Skin - Skin and subcutaneous tissue: Normal without rashes or lesions  Skin is warm and well perfused, normal turgor  Neurologic - Cranial nerves: II - XII intact  -- Speech: Normal        Psychiatric - Orientation to person, place, and time: Normal -- Mood and affect: Normal       Results/Data   ECG Report: Normal sinus rhythm right bundle branch block nonspecific T-wave changes      Future Appointments      Date/Time Provider Specialty Site   06/20/2018 09:00 AM Cardiology, [address] [facility]   03/19/2018 08:00 AM Cardiology, Device Remote   [facility] [address]   03/12/2018 08:00 AM Sydney Mohs, [address]     Signatures    Electronically signed by : Reno Wright DO; Jan 8 2018  4:44PM EST                       (Author)

## 2018-01-10 ENCOUNTER — GENERIC CONVERSION - ENCOUNTER (OUTPATIENT)
Dept: OTHER | Facility: OTHER | Age: 53
End: 2018-01-10

## 2018-01-10 ENCOUNTER — APPOINTMENT (OUTPATIENT)
Dept: RADIOLOGY | Facility: CLINIC | Age: 53
End: 2018-01-10
Payer: COMMERCIAL

## 2018-01-10 DIAGNOSIS — I49.8 OTHER SPECIFIED CARDIAC ARRHYTHMIAS (CODE): ICD-10-CM

## 2018-01-10 DIAGNOSIS — I25.10 ATHEROSCLEROTIC HEART DISEASE OF NATIVE CORONARY ARTERY WITHOUT ANGINA PECTORIS: ICD-10-CM

## 2018-01-10 DIAGNOSIS — E78.5 HYPERLIPIDEMIA: ICD-10-CM

## 2018-01-10 DIAGNOSIS — Z95.810 PRESENCE OF AUTOMATIC IMPLANTABLE CARDIOVERTER-DEFIBRILLATOR: ICD-10-CM

## 2018-01-10 DIAGNOSIS — I10 ESSENTIAL (PRIMARY) HYPERTENSION: ICD-10-CM

## 2018-01-10 DIAGNOSIS — I42.9 CARDIOMYOPATHY (HCC): ICD-10-CM

## 2018-01-10 DIAGNOSIS — R06.02 SHORTNESS OF BREATH: ICD-10-CM

## 2018-01-10 PROCEDURE — 71046 X-RAY EXAM CHEST 2 VIEWS: CPT

## 2018-01-11 ENCOUNTER — HOSPITAL ENCOUNTER (OUTPATIENT)
Dept: NON INVASIVE DIAGNOSTICS | Facility: CLINIC | Age: 53
Discharge: HOME | End: 2018-01-11
Payer: COMMERCIAL

## 2018-01-11 ENCOUNTER — GENERIC CONVERSION - ENCOUNTER (OUTPATIENT)
Dept: CARDIOLOGY CLINIC | Facility: CLINIC | Age: 53
End: 2018-01-11

## 2018-01-11 DIAGNOSIS — I42.9 FAMILIAL CARDIOMYOPATHY (HCC): ICD-10-CM

## 2018-01-11 LAB — BNP SERPL-MCNC: 12.6 PG/ML (ref 0–100)

## 2018-01-11 PROCEDURE — 93017 CV STRESS TEST TRACING ONLY: CPT

## 2018-01-11 PROCEDURE — A9502 TC99M TETROFOSMIN: HCPCS

## 2018-01-11 PROCEDURE — 78452 HT MUSCLE IMAGE SPECT MULT: CPT

## 2018-01-11 RX ADMIN — REGADENOSON 0.4 MG: 0.08 INJECTION, SOLUTION INTRAVENOUS at 13:45

## 2018-01-12 VITALS
RESPIRATION RATE: 14 BRPM | HEIGHT: 73 IN | HEART RATE: 72 BPM | WEIGHT: 220 LBS | SYSTOLIC BLOOD PRESSURE: 130 MMHG | BODY MASS INDEX: 29.16 KG/M2 | DIASTOLIC BLOOD PRESSURE: 72 MMHG

## 2018-01-12 LAB
ARRHY DURING EX: NORMAL
CHEST PAIN STATEMENT: NORMAL
MAX DIASTOLIC BP: 74 MMHG
MAX HEART RATE: 105 BPM
MAX PREDICTED HEART RATE: 166 BPM
MAX. SYSTOLIC BP: 128 MMHG
PROTOCOL NAME: NORMAL
REASON FOR TERMINATION: NORMAL
TARGET HR FORMULA: NORMAL
TEST INDICATION: NORMAL
TIME IN EXERCISE PHASE: NORMAL

## 2018-01-13 VITALS
DIASTOLIC BLOOD PRESSURE: 58 MMHG | SYSTOLIC BLOOD PRESSURE: 112 MMHG | HEIGHT: 73 IN | WEIGHT: 215 LBS | BODY MASS INDEX: 28.49 KG/M2 | HEART RATE: 77 BPM | OXYGEN SATURATION: 97 %

## 2018-01-13 VITALS
HEART RATE: 76 BPM | SYSTOLIC BLOOD PRESSURE: 120 MMHG | HEIGHT: 73 IN | WEIGHT: 215 LBS | DIASTOLIC BLOOD PRESSURE: 60 MMHG | BODY MASS INDEX: 28.49 KG/M2

## 2018-01-13 VITALS
WEIGHT: 214 LBS | HEIGHT: 73 IN | SYSTOLIC BLOOD PRESSURE: 120 MMHG | BODY MASS INDEX: 28.36 KG/M2 | RESPIRATION RATE: 14 BRPM | DIASTOLIC BLOOD PRESSURE: 82 MMHG | HEART RATE: 72 BPM

## 2018-01-14 VITALS
HEIGHT: 73 IN | WEIGHT: 213.44 LBS | DIASTOLIC BLOOD PRESSURE: 70 MMHG | BODY MASS INDEX: 28.29 KG/M2 | SYSTOLIC BLOOD PRESSURE: 126 MMHG | HEART RATE: 70 BPM

## 2018-01-14 VITALS
OXYGEN SATURATION: 98 % | SYSTOLIC BLOOD PRESSURE: 122 MMHG | DIASTOLIC BLOOD PRESSURE: 78 MMHG | HEIGHT: 73 IN | WEIGHT: 221.38 LBS | BODY MASS INDEX: 29.34 KG/M2 | HEART RATE: 74 BPM

## 2018-01-15 ENCOUNTER — ALLSCRIPTS OFFICE VISIT (OUTPATIENT)
Dept: OTHER | Facility: OTHER | Age: 53
End: 2018-01-15

## 2018-01-15 ENCOUNTER — GENERIC CONVERSION - ENCOUNTER (OUTPATIENT)
Dept: OTHER | Facility: OTHER | Age: 53
End: 2018-01-15

## 2018-01-18 ENCOUNTER — TELEPHONE (OUTPATIENT)
Dept: INPATIENT UNIT | Facility: HOSPITAL | Age: 53
End: 2018-01-18

## 2018-01-18 DIAGNOSIS — I47.2 VENTRICULAR TACHYCARDIA (HCC): ICD-10-CM

## 2018-01-18 RX ORDER — ASPIRIN 81 MG/1
324 TABLET, CHEWABLE ORAL ONCE
Status: CANCELLED | OUTPATIENT
Start: 2018-01-18 | End: 2018-01-18

## 2018-01-18 RX ORDER — SODIUM CHLORIDE 9 MG/ML
125 INJECTION, SOLUTION INTRAVENOUS CONTINUOUS
Status: CANCELLED | OUTPATIENT
Start: 2018-01-18

## 2018-01-19 ENCOUNTER — GENERIC CONVERSION - ENCOUNTER (OUTPATIENT)
Dept: OTHER | Facility: OTHER | Age: 53
End: 2018-01-19

## 2018-01-19 ENCOUNTER — HOSPITAL ENCOUNTER (OUTPATIENT)
Dept: NON INVASIVE DIAGNOSTICS | Facility: HOSPITAL | Age: 53
Discharge: HOME | End: 2018-01-19
Attending: INTERNAL MEDICINE | Admitting: INTERNAL MEDICINE
Payer: COMMERCIAL

## 2018-01-19 VITALS
SYSTOLIC BLOOD PRESSURE: 113 MMHG | HEART RATE: 71 BPM | OXYGEN SATURATION: 94 % | DIASTOLIC BLOOD PRESSURE: 75 MMHG | RESPIRATION RATE: 16 BRPM | TEMPERATURE: 98 F

## 2018-01-19 DIAGNOSIS — R94.39 ABNORMAL STRESS TEST: ICD-10-CM

## 2018-01-19 LAB
ANION GAP SERPL CALCULATED.3IONS-SCNC: 3 MMOL/L (ref 4–13)
ATRIAL RATE: 64 BPM
BUN SERPL-MCNC: 20 MG/DL (ref 5–25)
CALCIUM SERPL-MCNC: 8.7 MG/DL (ref 8.3–10.1)
CHLORIDE SERPL-SCNC: 105 MMOL/L (ref 100–108)
CHOLEST SERPL-MCNC: 113 MG/DL (ref 50–200)
CO2 SERPL-SCNC: 27 MMOL/L (ref 21–32)
CREAT SERPL-MCNC: 0.94 MG/DL (ref 0.6–1.3)
ERYTHROCYTE [DISTWIDTH] IN BLOOD BY AUTOMATED COUNT: 12.6 % (ref 11.6–15.1)
GFR SERPL CREATININE-BSD FRML MDRD: 92 ML/MIN/1.73SQ M
GLUCOSE P FAST SERPL-MCNC: 100 MG/DL (ref 65–99)
GLUCOSE SERPL-MCNC: 100 MG/DL (ref 65–140)
HCT VFR BLD AUTO: 41.7 % (ref 36.5–49.3)
HDLC SERPL-MCNC: 30 MG/DL (ref 40–60)
HGB BLD-MCNC: 14.8 G/DL (ref 12–17)
INR PPP: 1.11 (ref 0.86–1.16)
LDLC SERPL CALC-MCNC: 52 MG/DL (ref 0–100)
MAGNESIUM SERPL-MCNC: 2.1 MG/DL (ref 1.6–2.6)
MCH RBC QN AUTO: 30.5 PG (ref 26.8–34.3)
MCHC RBC AUTO-ENTMCNC: 35.5 G/DL (ref 31.4–37.4)
MCV RBC AUTO: 86 FL (ref 82–98)
P AXIS: 56 DEGREES
PLATELET # BLD AUTO: 171 THOUSANDS/UL (ref 149–390)
PMV BLD AUTO: 9.8 FL (ref 8.9–12.7)
POTASSIUM SERPL-SCNC: 4 MMOL/L (ref 3.5–5.3)
PR INTERVAL: 162 MS
PROTHROMBIN TIME: 14.3 SECONDS (ref 12.1–14.4)
QRS AXIS: 18 DEGREES
QRSD INTERVAL: 174 MS
QT INTERVAL: 466 MS
QTC INTERVAL: 480 MS
RBC # BLD AUTO: 4.86 MILLION/UL (ref 3.88–5.62)
SODIUM SERPL-SCNC: 135 MMOL/L (ref 136–145)
T WAVE AXIS: -28 DEGREES
TRIGL SERPL-MCNC: 157 MG/DL
VENTRICULAR RATE: 64 BPM
WBC # BLD AUTO: 6.3 THOUSAND/UL (ref 4.31–10.16)

## 2018-01-19 PROCEDURE — 80061 LIPID PANEL: CPT | Performed by: INTERNAL MEDICINE

## 2018-01-19 PROCEDURE — C1769 GUIDE WIRE: HCPCS | Performed by: INTERNAL MEDICINE

## 2018-01-19 PROCEDURE — C1894 INTRO/SHEATH, NON-LASER: HCPCS | Performed by: INTERNAL MEDICINE

## 2018-01-19 PROCEDURE — 83735 ASSAY OF MAGNESIUM: CPT | Performed by: INTERNAL MEDICINE

## 2018-01-19 PROCEDURE — 93571 IV DOP VEL&/PRESS C FLO 1ST: CPT | Performed by: INTERNAL MEDICINE

## 2018-01-19 PROCEDURE — C1887 CATHETER, GUIDING: HCPCS | Performed by: INTERNAL MEDICINE

## 2018-01-19 PROCEDURE — 99152 MOD SED SAME PHYS/QHP 5/>YRS: CPT | Performed by: INTERNAL MEDICINE

## 2018-01-19 PROCEDURE — 85610 PROTHROMBIN TIME: CPT | Performed by: INTERNAL MEDICINE

## 2018-01-19 PROCEDURE — 99153 MOD SED SAME PHYS/QHP EA: CPT | Performed by: INTERNAL MEDICINE

## 2018-01-19 PROCEDURE — 93005 ELECTROCARDIOGRAM TRACING: CPT | Performed by: INTERNAL MEDICINE

## 2018-01-19 PROCEDURE — 93458 L HRT ARTERY/VENTRICLE ANGIO: CPT | Performed by: INTERNAL MEDICINE

## 2018-01-19 PROCEDURE — 85027 COMPLETE CBC AUTOMATED: CPT | Performed by: INTERNAL MEDICINE

## 2018-01-19 PROCEDURE — 80048 BASIC METABOLIC PNL TOTAL CA: CPT | Performed by: INTERNAL MEDICINE

## 2018-01-19 RX ORDER — SODIUM CHLORIDE 9 MG/ML
100 INJECTION, SOLUTION INTRAVENOUS CONTINUOUS
Status: DISCONTINUED | OUTPATIENT
Start: 2018-01-19 | End: 2018-01-19 | Stop reason: HOSPADM

## 2018-01-19 RX ORDER — CLOPIDOGREL BISULFATE 75 MG/1
75 TABLET ORAL DAILY
Status: DISCONTINUED | OUTPATIENT
Start: 2018-01-19 | End: 2018-01-19 | Stop reason: HOSPADM

## 2018-01-19 RX ORDER — HEPARIN SODIUM 1000 [USP'U]/ML
INJECTION, SOLUTION INTRAVENOUS; SUBCUTANEOUS CODE/TRAUMA/SEDATION MEDICATION
Status: COMPLETED | OUTPATIENT
Start: 2018-01-19 | End: 2018-01-19

## 2018-01-19 RX ORDER — MIDAZOLAM HYDROCHLORIDE 1 MG/ML
INJECTION INTRAMUSCULAR; INTRAVENOUS CODE/TRAUMA/SEDATION MEDICATION
Status: COMPLETED | OUTPATIENT
Start: 2018-01-19 | End: 2018-01-19

## 2018-01-19 RX ORDER — VERAPAMIL HCL 2.5 MG/ML
AMPUL (ML) INTRAVENOUS CODE/TRAUMA/SEDATION MEDICATION
Status: COMPLETED | OUTPATIENT
Start: 2018-01-19 | End: 2018-01-19

## 2018-01-19 RX ORDER — FENTANYL CITRATE 50 UG/ML
INJECTION, SOLUTION INTRAMUSCULAR; INTRAVENOUS CODE/TRAUMA/SEDATION MEDICATION
Status: COMPLETED | OUTPATIENT
Start: 2018-01-19 | End: 2018-01-19

## 2018-01-19 RX ORDER — LIDOCAINE HYDROCHLORIDE 10 MG/ML
INJECTION, SOLUTION INFILTRATION; PERINEURAL CODE/TRAUMA/SEDATION MEDICATION
Status: COMPLETED | OUTPATIENT
Start: 2018-01-19 | End: 2018-01-19

## 2018-01-19 RX ORDER — ASPIRIN 81 MG/1
324 TABLET, CHEWABLE ORAL ONCE
Status: COMPLETED | OUTPATIENT
Start: 2018-01-19 | End: 2018-01-19

## 2018-01-19 RX ORDER — SODIUM CHLORIDE 9 MG/ML
125 INJECTION, SOLUTION INTRAVENOUS CONTINUOUS
Status: DISCONTINUED | OUTPATIENT
Start: 2018-01-19 | End: 2018-01-19

## 2018-01-19 RX ORDER — ATORVASTATIN CALCIUM 40 MG/1
40 TABLET, FILM COATED ORAL
Status: DISCONTINUED | OUTPATIENT
Start: 2018-01-19 | End: 2018-01-19 | Stop reason: HOSPADM

## 2018-01-19 RX ORDER — NITROGLYCERIN 20 MG/100ML
INJECTION INTRAVENOUS CODE/TRAUMA/SEDATION MEDICATION
Status: COMPLETED | OUTPATIENT
Start: 2018-01-19 | End: 2018-01-19

## 2018-01-19 RX ADMIN — FENTANYL CITRATE 50 MCG: 50 INJECTION, SOLUTION INTRAMUSCULAR; INTRAVENOUS at 10:58

## 2018-01-19 RX ADMIN — VERAPAMIL HYDROCHLORIDE 1.25 MG: 2.5 INJECTION, SOLUTION INTRAVENOUS at 11:07

## 2018-01-19 RX ADMIN — MIDAZOLAM 2 MG: 1 INJECTION INTRAMUSCULAR; INTRAVENOUS at 10:58

## 2018-01-19 RX ADMIN — SODIUM CHLORIDE 125 ML/HR: 0.9 INJECTION, SOLUTION INTRAVENOUS at 09:05

## 2018-01-19 RX ADMIN — HEPARIN SODIUM 4000 UNITS: 1000 INJECTION INTRAVENOUS; SUBCUTANEOUS at 11:06

## 2018-01-19 RX ADMIN — IOHEXOL 100 ML: 350 INJECTION, SOLUTION INTRAVENOUS at 11:29

## 2018-01-19 RX ADMIN — LIDOCAINE HYDROCHLORIDE 0.1 ML: 10 INJECTION, SOLUTION INFILTRATION; PERINEURAL at 11:04

## 2018-01-19 RX ADMIN — ASPIRIN 81 MG 324 MG: 81 TABLET ORAL at 09:05

## 2018-01-19 RX ADMIN — FENTANYL CITRATE 50 MCG: 50 INJECTION, SOLUTION INTRAMUSCULAR; INTRAVENOUS at 11:02

## 2018-01-19 RX ADMIN — MIDAZOLAM 2 MG: 1 INJECTION INTRAMUSCULAR; INTRAVENOUS at 11:02

## 2018-01-19 RX ADMIN — NITROGLYCERIN 200 MCG: 20 INJECTION INTRAVENOUS at 11:06

## 2018-01-19 NOTE — DISCHARGE INSTRUCTIONS
1  Please see the post cardiac catheterization dishcarge instructions  No heavy lifting, greater than 10 lbs  or strenuous  activity for 48 hrs  2 Remove band aid tomorrow  Shower and wash area- wrist gently with soap and water- beginning tomorrow  Rinse and pat dry  Apply new water seal band aid  Repeat this process for 5 days  No powders, creams lotions or antibiotic ointments  for 5 days  No tub baths, hot tubs or swimming for 5 days  3  Please call our office ([phone]) if you have any fever, redness, swelling, discharge from your wrist access site  4 No driving for 1 day  After Heart Catheterization   AMBULATORY CARE:   Call 911 for any of the following:   · You have any of the following signs of a heart attack:      ¨ Squeezing, pressure, or pain in your chest that lasts longer than 5 minutes or returns    ¨ Discomfort or pain in your back, neck, jaw, stomach, or arm     ¨ Trouble breathing    ¨ Nausea or vomiting    ¨ Lightheadedness or a sudden cold sweat, especially with chest pain or trouble breathing    · You have any of the following signs of a stroke:      ¨ Numbness or drooping on one side of your face     ¨ Weakness in an arm or leg    ¨ Confusion or difficulty speaking    ¨ Dizziness, a severe headache, or vision loss    · You feel lightheaded, short of breath, and have chest pain  · You cough up blood  · You have trouble breathing  · You cannot stop the bleeding from your wound even after you hold firm pressure for 10 minutes  Seek care immediately if:   · Blood soaks through your bandage  · Your stitches come apart  · Your arm or leg feels numb, cool, or looks pale  · Your wound gets swollen quickly  Contact your healthcare provider if:   · You have a fever or chills  · Your wound is red, swollen, or draining pus  · Your wound looks more bruised or you have new bruising on the side of your leg or arm       · You have nausea or are vomiting  · Your skin is itchy, swollen, or you have a rash  · You have questions or concerns about your condition or care  Medicines: You may need any of the following:  · Blood thinners    help prevent blood clots  Examples of blood thinners include heparin and warfarin  Clots can cause strokes, heart attacks, and death  The following are general safety guidelines to follow while you are taking a blood thinner:    ¨ Watch for bleeding and bruising while you take blood thinners  Watch for bleeding from your gums or nose  Watch for blood in your urine and bowel movements  Use a soft washcloth on your skin, and a soft toothbrush to brush your teeth  This can keep your skin and gums from bleeding  If you shave, use an electric shaver  Do not play contact sports  ¨ Tell your dentist and other healthcare providers that you take anticoagulants  Wear a bracelet or necklace that says you take this medicine  ¨ Do not start or stop any medicines unless your healthcare provider tells you to  Many medicines cannot be used with blood thinners  ¨ Tell your healthcare provider right away if you forget to take the medicine, or if you take too much  ¨ Warfarin  is a blood thinner that you may need to take  The following are things you should be aware of if you take warfarin  § Foods and medicines can affect the amount of warfarin in your blood  Do not make major changes to your diet while you take warfarin  Warfarin works best when you eat about the same amount of vitamin K every day  Vitamin K is found in green leafy vegetables and certain other foods  Ask for more information about what to eat when you are taking warfarin  § You will need to see your healthcare provider for follow-up visits when you are on warfarin  You will need regular blood tests  These tests are used to decide how much medicine you need  · Acetaminophen  helps decrease pain and fever   This medicine is available without a doctor's order  Ask how much medicine is safe to take, and how often to take it  Acetaminophen can cause liver damage if not taken correctly  · Take your medicine as directed  Contact your healthcare provider if you think your medicine is not helping or if you have side effects  Tell him or her if you are allergic to any medicine  Keep a list of the medicines, vitamins, and herbs you take  Include the amounts, and when and why you take them  Bring the list or the pill bottles to follow-up visits  Carry your medicine list with you in case of an emergency  Bathing: You may be able to shower the day after your procedure  Remove your pressure bandage before you shower  Do not take baths or go in hot tubs or pools  Carefully wash the wound with soap and water  Pat the area dry  Care for your wound as directed:  Change your bandage when it gets wet or dirty  A small bandage can be placed on your wound after you remove the pressure bandage  Do not put powders, lotions, or creams on your wound  They may cause your wound to get infected  Monitor your wound every day for signs of infection, such as redness, swelling, or pus  Mild bruising is normal and expected  If bleeding from your wound occurs:  Apply firm, steady pressure to stop the bleeding  Apply pressure with a clean gauze or towel for 5 to 10 minutes  Call 911 if bleeding becomes heavy or does not stop  Activity:  Do not lift anything heavier than 5 pounds until directed by your healthcare provider  Heavy lifting can put stress on your wound and cause bleeding  Do not push or pull with the arm that was used for the procedure  Do not do vigorous activity for at least 48 hours  Vigorous activity may cause bleeding from your wound  Rest and do quiet activities  Short walks to the bathroom and around the house are okay  Limit your stair climbing to prevent bleeding  Ask your healthcare provider when you can return to your normal activities     Do not strain when you have a bowel movement:  Your wound may bleed if you strain to have a bowel movement  Keep your legs flat on the floor and your hips at a 90° angle  Talk to your healthcare provider if you are constipated  You may need medicine to make it easier for you to have a bowel movement and to prevent straining  Drink liquids as directed:  Liquids will help flush the contrast liquid from your body  Ask how much liquid to drink each day and which liquids are best for you  Driving:  Ask your healthcare provider when it is okay for you to drive  He may tell you to wait 48 hours before you drive to decrease your risk for bleeding  Returning to work: You may not be able to return to work for at least 2 days after your procedure if your job involves heavy lifting  Ask your healthcare provider when it is okay for you to return to work  © 2017 [facility] Information is for End User's use only and may not be sold, redistributed or otherwise used for commercial purposes  All illustrations and images included in CareNotes® are the copyrighted property of [employer]  or Juan Jose Rogers  The above information is an  only  It is not intended as medical advice for individual conditions or treatments  Talk to your doctor, nurse or pharmacist before following any medical regimen to see if it is safe and effective for you

## 2018-01-22 ENCOUNTER — TRANSCRIBE ORDERS (OUTPATIENT)
Dept: ADMINISTRATIVE | Facility: HOSPITAL | Age: 53
End: 2018-01-22

## 2018-01-22 VITALS
BODY MASS INDEX: 29.29 KG/M2 | DIASTOLIC BLOOD PRESSURE: 72 MMHG | WEIGHT: 221 LBS | SYSTOLIC BLOOD PRESSURE: 104 MMHG | HEIGHT: 73 IN | HEART RATE: 69 BPM

## 2018-01-22 DIAGNOSIS — R06.02 SHORTNESS OF BREATH: Primary | ICD-10-CM

## 2018-01-23 ENCOUNTER — HOSPITAL ENCOUNTER (OUTPATIENT)
Dept: PULMONOLOGY | Facility: HOSPITAL | Age: 53
Discharge: HOME | End: 2018-01-23
Attending: INTERNAL MEDICINE
Payer: COMMERCIAL

## 2018-01-23 ENCOUNTER — ALLSCRIPTS OFFICE VISIT (OUTPATIENT)
Dept: OTHER | Facility: OTHER | Age: 53
End: 2018-01-23

## 2018-01-23 DIAGNOSIS — R06.02 SHORTNESS OF BREATH: ICD-10-CM

## 2018-01-23 PROCEDURE — 94729 DIFFUSING CAPACITY: CPT

## 2018-01-23 PROCEDURE — 94726 PLETHYSMOGRAPHY LUNG VOLUMES: CPT

## 2018-01-23 PROCEDURE — 94010 BREATHING CAPACITY TEST: CPT

## 2018-01-23 PROCEDURE — 94760 N-INVAS EAR/PLS OXIMETRY 1: CPT

## 2018-01-23 RX ORDER — LEVALBUTEROL INHALATION SOLUTION 0.63 MG/3ML
0.63 SOLUTION RESPIRATORY (INHALATION) ONCE
Status: COMPLETED | OUTPATIENT
Start: 2018-01-23 | End: 2018-01-23

## 2018-01-23 RX ORDER — ALBUTEROL SULFATE 2.5 MG/3ML
2.5 SOLUTION RESPIRATORY (INHALATION) ONCE
Status: DISCONTINUED | OUTPATIENT
Start: 2018-01-23 | End: 2018-01-23

## 2018-01-23 RX ADMIN — LEVALBUTEROL HYDROCHLORIDE 0.63 MG: 0.63 SOLUTION RESPIRATORY (INHALATION) at 09:05

## 2018-01-24 VITALS
HEART RATE: 73 BPM | DIASTOLIC BLOOD PRESSURE: 62 MMHG | OXYGEN SATURATION: 98 % | HEIGHT: 73 IN | WEIGHT: 224 LBS | BODY MASS INDEX: 29.69 KG/M2 | SYSTOLIC BLOOD PRESSURE: 110 MMHG | TEMPERATURE: 97.7 F

## 2018-01-24 NOTE — PROGRESS NOTES
Assessment    1  Brugada syndrome (746 89) (I49 8)   2  ICD (implantable cardioverter-defibrillator) in place (V45 02) (Z95 810)   3  NSVT (nonsustained ventricular tachycardia) (427 1) (I47 2)   4  Cardiomyopathy (425 4) (I42 9)   5  Shortness of breath on exertion (786 05) (R06 02)    Plan  Brugada syndrome, Cardiomyopathy, ICD (implantable cardioverter-defibrillator) in place,  NSVT (nonsustained ventricular tachycardia)    · 1 - Gustavo Kevan Ramos DO  (Cardiology) Co-Management  *  Status: Active  Requested for:  11BHD1340  Care Summary provided  : Yes    Discussion/Summary    Pt encouraged to go for PFT  Will refer to electrophysiologist for 2nd opinion and reassurance  The patient, patient's family was counseled regarding  Possible side effects of new medications were reviewed with the patient/guardian today  The treatment plan was reviewed with the patient/guardian  The patient/guardian understands and agrees with the treatment plan      Chief Complaint  Follow up visit      History of Present Illness  Pt is here with symptoms of shortness of breath, which he has been experiencing with minimal exertion, and talking  He has h/o Brugada syndrome, and has lost his mother and siblings at a young age  He is here with his wife, and they are both emotional and apprehensive about his symptoms  Pt was Recently evaluated by his cardiologist and has undergone recent catheterization and Sasha Briggs  Pt has been advised a PFT by the cardiologist, which they have scheduled  They are requesting a referral to followup with an electrophysiologist to overview the situation for them, and for some reassurance  Petrona Marcus presents with complaints of gradual onset of intermittent episodes of moderate shortness of breath  Episodes weeks ago  His symptoms are caused by no known event  Symptoms are made worse by talking  Symptoms are unchanged     Associated symptoms include no lightheadedness, no palpitations, no weakness, no chest pain, no chest tightness, no choking sensation, no cough, no wheezing, no fever, no anxiety, no sore throat, no weight gain, no leg pain and no edema  Review of Systems    Constitutional: as noted in HPI  Cardiovascular: as noted in HPI  Respiratory: as noted in HPI  Active Problems    1  Abnormal fasting glucose (790 29) (R73 01)   2  Acute frontal sinusitis (461 1) (J01 10)   3  Benign essential hypertension (401 1) (I10)   4  Brugada syndrome (746 89) (I49 8)   5  CAD (coronary artery disease) (414 00) (I25 10)   6  Cardiomyopathy (425 4) (I42 9)   7  Colon cancer screening (V76 51) (Z12 11)   8  Hyperlipidemia (272 4) (E78 5)   9  ICD (implantable cardioverter-defibrillator) in place (V45 02) (Z95 810)   10  NSVT (nonsustained ventricular tachycardia) (427 1) (I47 2)   11  Prediabetes (790 29) (R73 03)   12  Shortness of breath on exertion (786 05) (R06 02)    Past Medical History    The active problems and past medical history were reviewed and updated today  Family History  Mother    1  Family history of cardiac disorder (V17 49) (Z82 49)   2  Denied: Family history of depression   3  Denied: Family history of substance abuse  Father    4  Denied: Family history of depression   5  Denied: Family history of substance abuse  Sibling    6  Denied: Family history of depression   7  Denied: Family history of substance abuse    Social History    · Former smoker (W38 94) (G53 616)  The social history was reviewed and updated today  Current Meds   1  Aspirin 81 MG Oral Tablet Delayed Release; TAKE 1 TABLET DAILY; Therapy: (Recorded:38Mgk5458) to Recorded   2  Atorvastatin Calcium 40 MG Oral Tablet; TAKE 1 TABLET DAILY  Requested for:   11QKF8259; Last Rx:36Lxm0074 Ordered   3  Clopidogrel Bisulfate 75 MG Oral Tablet; TAKE 1 TABLET DAILY; Therapy: 88OBH7296 to (Evaluate:15Phc5494)  Requested for: 04CHG8079; Last   Rx:51Cpm6560 Ordered   4   Furosemide 20 MG Oral Tablet; TAKE 1 TABLET DAILY AS DIRECTED  Requested for:   66HGM1859; Last Rx:15Flz4481 Ordered   5  Isosorbide Mononitrate ER 60 MG Oral Tablet Extended Release 24 Hour; TAKE 1   TABLET IN THE AM, AND 1/2 TABLET IN THE PM  Requested for: 47RFL1807; Last   Rx:75Uab5515 Ordered   6  Lisinopril 5 MG Oral Tablet; TAKE 1 TABLET DAILY  Requested for: 30DEZ4213; Last   Rx:54Chm4272 Ordered   7  Metoprolol Succinate ER 25 MG Oral Tablet Extended Release 24 Hour; Take 1 tablet   twice daily; Therapy: 66SUS2825 to (Evaluate:30Jun2018)  Requested for: 31LRT6342; Last   Rx:76Pej5957 Ordered   8  Nitroglycerin 0 4 MG Sublingual Tablet Sublingual; DISSOLVE 1 TABLET UNDER THE   TONGUE AS NEEDED FOR CHEST PAIN;   Therapy: 80ZSU2539 to (Evaluate:56Cxk1110)  Requested for: 52POM0502; Last   Rx:[id number] Ordered    The medication list was reviewed and updated today  Allergies    1  No Known Drug Allergies    Vitals  Vital Signs    Recorded: 76NXW7206 11:29AM   Heart Rate 67   Systolic 137   Diastolic 70   Height 6 ft 1 in   Weight 223 lb    BMI Calculated 29 42   BSA Calculated 2 25   O2 Saturation 97     Physical Exam    Constitutional   General appearance: No acute distress, well appearing and well nourished  Pulmonary   Auscultation of lungs: Clear to auscultation, equal breath sounds bilaterally, no wheezes, no rales, no rhonci  Cardiovascular   Auscultation of heart: Normal rate and rhythm, normal S1 and S2, without murmurs  Examination of extremities for edema and/or varicosities: Normal     Abdomen   Abdomen: Non-tender, no masses  Liver and spleen: No hepatomegaly or splenomegaly  Psychiatric   Orientation to person, place and time: Normal     Mood and affect: Normal   Mood and Affect: calm and concerned          Future Appointments    Date/Time Provider Specialty Site   06/20/2018 09:00 AM Cardiology, [address] [facility]   03/19/2018 08:00 AM Cardiology, Device Remote  [facility] [address]   03/12/2018 08:00 AM Beto Frankel MD Family [facility] [address]      Signatures   Electronically signed by : Ezequiel Yang MD; Jan 23 2018  1:03PM EST                       (Author)

## 2018-02-05 ENCOUNTER — OFFICE VISIT (OUTPATIENT)
Dept: CARDIOLOGY CLINIC | Facility: CLINIC | Age: 53
End: 2018-02-05
Payer: COMMERCIAL

## 2018-02-05 VITALS
HEIGHT: 73 IN | WEIGHT: 225.8 LBS | BODY MASS INDEX: 29.93 KG/M2 | OXYGEN SATURATION: 97 % | SYSTOLIC BLOOD PRESSURE: 120 MMHG | DIASTOLIC BLOOD PRESSURE: 72 MMHG | HEART RATE: 68 BPM

## 2018-02-05 DIAGNOSIS — I47.2 NSVT (NONSUSTAINED VENTRICULAR TACHYCARDIA) (HCC): ICD-10-CM

## 2018-02-05 DIAGNOSIS — I25.110 CORONARY ARTERY DISEASE INVOLVING NATIVE HEART WITH UNSTABLE ANGINA PECTORIS, UNSPECIFIED VESSEL OR LESION TYPE (HCC): ICD-10-CM

## 2018-02-05 DIAGNOSIS — I49.8 BRUGADA SYNDROME: Primary | ICD-10-CM

## 2018-02-05 DIAGNOSIS — I25.5 ISCHEMIC CARDIOMYOPATHY: ICD-10-CM

## 2018-02-05 DIAGNOSIS — I10 BENIGN ESSENTIAL HYPERTENSION: ICD-10-CM

## 2018-02-05 PROBLEM — E78.5 HYPERLIPIDEMIA: Status: ACTIVE | Noted: 2017-05-18

## 2018-02-05 PROBLEM — I42.9 CARDIOMYOPATHY (HCC): Status: ACTIVE | Noted: 2017-05-18

## 2018-02-05 PROCEDURE — 99214 OFFICE O/P EST MOD 30 MIN: CPT | Performed by: INTERNAL MEDICINE

## 2018-02-05 NOTE — PROGRESS NOTES
Cardiology Follow Up    Cintia Homero  1965  [id number]  [facility] [address] [address]    1  Brugada syndrome     2  Ischemic cardiomyopathy     3  Coronary artery disease involving native heart with unstable angina pectoris, unspecified vessel or lesion type ([facility] [address] )     4  Benign essential hypertension     5  NSVT (nonsustained ventricular tachycardia) ([facility])         Discussion/Summary:  His recent left heart catheterization showed no obstructive lesions continue with medical therapy  He is still having episodes of lightheadedness and shortness of breath  Recent device interrogation shows 27 seconds of nonsustained ventricular tachycardia  I would like to have him evaluated by the electrophysiology department he may need alterations device settings to allow ATP at a lower level  He also is coming up on the SUZAN and will need a generator change soon  Blood pressures been well controlled  Lipids have been doing well  Interval History:  Recent catheterization shows no Recurrent obstructive lesions he continues to have episodes of shortness of breath in  Were he feels lightheaded as if he is going to pass out  Patient had 1 of these episodes while taking out the garbage he had to leave him self up against the car before things past   His heart felt tight afterwards  Functional capacity has been limited  Pulmonary function testing was reviewed no significant concerns  Problem List     Dyspnea on exertion    Dizziness    Brugada syndrome    Overview Signed 2/5/2018  7:56 AM by Luciano Serrano DO     Last Assessment & Plan:   Follows with device clinic           Ischemic cardiomyopathy    CAD (coronary artery disease)    Chest pain    MI (myocardial infarction)    Overview Signed 6/14/2017  6:51 PM by Wendy Rodarte PA-C     per pt report         ICD (implantable cardioverter-defibrillator) in place    Hypertension    Benign essential hypertension    Overview Signed 2/5/2018  7:56 AM by Kerry Strange DO     Last Assessment & Plan:   Controlled  Cardiomyopathy ([facility] )    Hyperlipidemia    NSVT (nonsustained ventricular tachycardia) ([facility] )    Pacemaker    Overview Signed 2/5/2018  7:56 AM by Kerry Strange DO     Last Assessment & Plan:   Follows with device clinic             Past Medical History:   Diagnosis Date    Bradycardia     Brugada syndrome     Cardiac disease     Hyperlipidemia     Hypertension     ICD (implantable cardioverter-defibrillator) in place     MI (myocardial infarction)     per pt report    Pacemaker      Social History     Social History    Marital status: Single     Spouse name: N/A    Number of children: N/A    Years of education: N/A     Occupational History    Not on file  Social History Main Topics    Smoking status: Former Smoker     Quit date: 1/19/2006    Smokeless tobacco: Never Used    Alcohol use 2 4 oz/week     2 Cans of beer, 2 Glasses of wine per week      Comment: Social    Drug use: No    Sexual activity: Not on file     Other Topics Concern    Not on file     Social History Narrative    No narrative on file      Family History   Problem Relation Age of Onset    Heart disease Mother      Past Surgical History:   Procedure Laterality Date    CARDIAC CATHETERIZATION      CARDIAC DEFIBRILLATOR PLACEMENT      CARDIAC PACEMAKER PLACEMENT      LUMBAR FUSION         Current Outpatient Prescriptions:     clopidogrel (PLAVIX) 75 mg tablet, Take 75 mg by mouth daily, Disp: , Rfl:     furosemide (LASIX) 20 mg tablet, Take 20 mg by mouth daily, Disp: , Rfl:     isosorbide mononitrate (IMDUR) 30 mg 24 hr tablet, Take 2 tabs (60 mg) in the AM with breakfast and 1 tab (30 mg) in the evening at dinnertime  , Disp: 90 tablet, Rfl: 0    lisinopril (ZESTRIL) 5 mg tablet, Take 5 mg by mouth daily, Disp: , Rfl:     metoprolol succinate (TOPROL-XL) 25 mg 24 hr tablet, Take 1 tablet by mouth 2 (two) times a day, Disp: 60 tablet, Rfl: 0    nitroglycerin (NITROSTAT) 0 4 mg SL tablet, Place 1 tablet under the tongue every 5 (five) minutes as needed for chest pain, Disp: 90 tablet, Rfl: 0    aspirin 81 mg chewable tablet, Chew 1 tablet daily for 30 days, Disp: 30 tablet, Rfl: 0    atorvastatin (LIPITOR) 40 mg tablet, Take 1 tablet by mouth daily with dinner for 30 days, Disp: 30 tablet, Rfl: 0  No Known Allergies    Labs:     Chemistry        Component Value Date/Time     (L) 01/19/2018 0905     05/23/2017 1055    K 4 0 01/19/2018 0905    K 4 1 05/23/2017 1055     01/19/2018 0905     05/23/2017 1055    CO2 27 01/19/2018 0905    CO2 22 05/23/2017 1055    BUN 20 01/19/2018 0905    BUN 13 05/23/2017 1055    CREATININE 0 94 01/19/2018 0905    CREATININE 0 95 05/23/2017 1055        Component Value Date/Time    CALCIUM 8 7 01/19/2018 0905    CALCIUM 9 7 05/23/2017 1055    ALKPHOS 72 06/15/2017 0541    ALKPHOS 81 05/23/2017 1055    AST 25 06/15/2017 0541    AST 27 05/23/2017 1055    ALT 33 06/15/2017 0541    ALT 28 05/23/2017 1055    BILITOT 0 90 06/15/2017 0541    BILITOT 0 7 05/23/2017 1055            Lab Results   Component Value Date    CHOL 113 01/19/2018    CHOL 107 06/15/2017    CHOL 159 04/06/2017     Lab Results   Component Value Date    HDL 30 (L) 01/19/2018    HDL 33 (L) 06/15/2017    HDL 32 (L) 04/06/2017     Lab Results   Component Value Date    LDLCALC 52 01/19/2018    LDLCALC 61 06/15/2017    LDLCALC 107 (H) 04/06/2017     Lab Results   Component Value Date    TRIG 157 (H) 01/19/2018    TRIG 63 06/15/2017    TRIG 102 04/06/2017     No components found for: CHOLHDL    Imaging: Xr Chest Pa & Lateral    Result Date: 1/10/2018  Narrative: CHEST - DUAL ENERGY INDICATION:  Shortness of breath COMPARISON:  May 1, 2017 VIEWS:  PA (including soft tissue/bone algorithms) and lateral projections IMAGES:  5 FINDINGS:  Lungs adequately aerated  No consolidation or effusion  Cardiac size within normal limits  No vascular congestion or peribronchial thickening  Bony structures grossly intact  No pneumothorax or free air  Left-sided cardiac pacer with intact lead  Visualized osseous structures appear within normal limits for the patient's age  Impression: No active pulmonary disease  Workstation performed: [id number]     Nm Procedure (historical)    Result Date: 2018  Narrative: [place] [address]  [phone]  Rest/Stress Gated SPECT Myocardial Perfusion Imaging After Regadenoson  Patient: Kevin Heard  MR number: [id number]  Account number: [de-identified]  : 1965  Age: 45 years  Gender: Male  Status: Outpatient  Location: [address] [place] [address] [facility]  Height: 73 in  Weight: 221 lb  BP: 120/ 70 mmHg  Allergies: NO KNOWN ALLERGIES  Diagnosis: I25 10 - Atherosclerotic heart disease of native coronary artery without angina pectoris  Interpreting Physician:  Db Horan DO  Referring Physician:  Dalton Reyes DO  Primary Physician:  Rebeca Claudio MD  Technician:  DAMARIS Whitten  RN:  Denys Sinclair RN  Group:  [facility]  Cardiology Fellow:  Rogelio Fonseca MD  Report Prepared by[de-identified]  Denys Sinclair RN  INDICATIONS: Evaluation of known coronary artery disease  HISTORY: The patient is a 45year old  male  Chest pain status: no chest pain  Other symptoms: dyspnea  Coronary artery disease risk factors: dyslipidemia, hypertension, former smoking, and family history of premature coronary  artery disease   Cardiovascular history: coronary artery disease, cardiomyopathy, Brugada syndrome Prior cardiovascular procedures: percutaneous transluminal coronary angioplasty s/p stent to circumflex , chronic totally occluded 1st obtuse  marginal, implantable cardiac defibrillator Medications: a beta blocker, an ACE inhibitor, a diuretic, aspirin, clopidogrel, and a lipid lowering agent  Previous test results: abnormal ECG (RBBB)  PHYSICAL EXAM: Baseline physical exam screening: normal   REST ECG: Normal sinus rhythm  The ECG showed right bundle branch block  PROCEDURE: The study was performed at the [address] [facility]  A regadenoson infusion pharmacologic stress test was performed  Gated SPECT myocardial perfusion imaging was performed after stress  Systolic blood pressure was  120 mmHg, at the start of the study  Diastolic blood pressure was 70 mmHg, at the start of the study  The heart rate was 67 bpm, at the start of the study  IV double checked  Regadenoson protocol:  HR bpm SBP mmHg DBP mmHg Symptoms  Baseline 67 120 70 none  Immediate 105 128 74 mild dyspnea  1 min 70 122 76 subsiding  No medications or fluids given  The patient also performed low level exercise  STRESS SUMMARY: Duration of pharmacologic stress was 3 min  Maximal heart rate during stress was 105 bpm  The rate-pressure product for the peak heart rate and blood pressure was 65438  There was no chest pain during stress  The stress  test was terminated due to protocol completion  Pre oxygen saturation: 96 %  Peak oxygen saturation: 96 %  The stress ECG was non-diagnostic for ischemia  Arrhythmia during stress: isolated premature ventricular beats  ISOTOPE ADMINISTRATION:  Resting isotope administration Stress isotope administration  Agent Tetrofosmin Tetrofosmin  Dose 10 36 mCi 30 4 mCi  Date 01/11/2018 01/11/2018  Injection time 12:05 13:45  Imaging time 13:10 14:19  Injection-image interval 65 min 34 min  The radiopharmaceutical was injected at the peak effect of pharmacologic stress  MYOCARDIAL PERFUSION IMAGING:  The image quality was poor  Rotating projection images reveal mild anterior chest wall soft tissue attenuation and moderate subdiaphragmatic activity   Left ventricular size was normal  The left ventricle did not visually dilate transiently  during stress  The TID ratio was 0 68  PERFUSION DEFECTS:  -  There was a small, mildly severe, reversible myocardial perfusion defect of the basal to mid anterior wall possibly due to chest wall soft tissue attenuation artifact  -  There was a small, mildly severe, reversible myocardial perfusion defect of the basal lateral and anterolateral walls  GATED SPECT:  The calculated left ventricular ejection fraction was 66 %  Left ventricular ejection fraction was within normal limits by visual estimate  There was no left ventricular regional abnormality  SUMMARY:  -  Stress results: There was no chest pain during stress  -  ECG conclusions: The stress ECG was non-diagnostic for ischemia  -  Perfusion imaging: The image quality was poor  The left ventricle did not visually dilate transiently during stress  There was a small, mildly severe, reversible myocardial perfusion defect of the basal to mid anterior wall possibly due  to chest wall soft tissue attenuation artifact  There was a small, mildly severe, reversible myocardial perfusion defect of the basal lateral and anterolateral walls  -  Gated SPECT: The calculated left ventricular ejection fraction was 66 %  Left ventricular ejection fraction was within normal limits by visual estimate  There was no left ventricular regional abnormality  IMPRESSIONS: Abnormal study after pharmacologic vasodilation without reproduction of symptoms although imaging quality was suboptimal  There was a small amount of ischemia in the distribution of the left circumflex coronary artery  There  was a small reversible defect in the basal to mid anterior wall which may be secondary to attenuation artifact although a small amount of LAD territory ischemia cannot be excluded  Left ventricular systolic function was normal   Prepared and signed by  Arnoldo Jones DO  Signed 01/11/2018 16:18:32      ECG:        Review of Systems   Constitution: Negative  HENT: Negative  Eyes: Negative  Cardiovascular: Negative  Respiratory: Positive for shortness of breath  Endocrine: Negative  Hematologic/Lymphatic: Negative  Skin: Negative  Musculoskeletal: Negative  Gastrointestinal: Negative  Genitourinary: Negative  Neurological: Positive for light-headedness  Psychiatric/Behavioral: Negative  Vitals:    02/05/18 1134   BP: 120/72   Pulse: 68   SpO2: 97%     Vitals:    02/05/18 1134   Weight: 102 kg (225 lb 12 8 oz)     Height: 6' 1" (185 4 cm)   Body mass index is 29 79 kg/m²      Physical Exam:  Vital signs reviewed  General appearance:  Appears stated age, alert, well appearing and in no distress  HEENT:  PERRLA, EOMI, no scleral icterus, no conjunctival pallor  NECK:  Supple, No elevated JVP, no thyromegaly, no carotid bruits  HEART:  Regular rate and rhythm, normal S1/S2, no S3/S4, no murmur or rub  LUNGS:  Clear to auscultation bilaterally, no wheezes rales or rhonchi  ABDOMEN:  Soft, non-tender, positive bowel sounds, no rebound or guarding, no organomegaly   EXTREMITIES:  No edema, normal range of motion  VASCULAR:  Normal pedal pulses, good pulse volume   SKIN: No lesions or rashes on exposed skin  NEURO:  CN II-XII intact, no focal deficits

## 2018-02-22 ENCOUNTER — OFFICE VISIT (OUTPATIENT)
Dept: CARDIOLOGY CLINIC | Facility: CLINIC | Age: 53
End: 2018-02-22
Payer: COMMERCIAL

## 2018-02-22 VITALS
HEIGHT: 73 IN | BODY MASS INDEX: 30.09 KG/M2 | WEIGHT: 227 LBS | HEART RATE: 66 BPM | DIASTOLIC BLOOD PRESSURE: 80 MMHG | SYSTOLIC BLOOD PRESSURE: 130 MMHG

## 2018-02-22 DIAGNOSIS — I49.8 BRUGADA SYNDROME: Primary | ICD-10-CM

## 2018-02-22 PROCEDURE — 93000 ELECTROCARDIOGRAM COMPLETE: CPT | Performed by: INTERNAL MEDICINE

## 2018-02-22 PROCEDURE — 99244 OFF/OP CNSLTJ NEW/EST MOD 40: CPT | Performed by: INTERNAL MEDICINE

## 2018-02-22 NOTE — PROGRESS NOTES
Cardiology Consultation     Will Zhang  [id number]  1965  [facility] [place]  [address] [place] [address]    Consult for Brugada syndrome: had episodes of fainting when he was younger  15 years ago ICD placed  Sister had same symptoms and had an ablation  Mother  in sleep at age 44  He gets SOB with walking fast, eating, love making, emotional situations  Intermittent chest discomfort  Cath shows no obstructive CAD  He does have hx stent placement  CORONARY CIRCULATION:  Left main: Normal   LAD: The vessel was normal sized  There was minor plaque  There were no obstructive lesions  Circumflex: The vessel was normal sized and gave rise to a major OM branch  The site of prior  stent placement is widely patent with no restenosis  RCA: The vessel was normal sized and dominant  There was a 50% stenosis in mid vessel  The lesion was interrogated by iFR and was not flow-significant (iFR=0 99)      HEMODYNAMICS:  Hemodynamic assessment demonstrated normal LVEDP (11 mmHg)                   1  Brugada syndrome  POCT ECG     Patient Active Problem List   Diagnosis    Dyspnea on exertion    Dizziness    Brugada syndrome    Ischemic cardiomyopathy    CAD (coronary artery disease)    Chest pain    MI (myocardial infarction)    ICD (implantable cardioverter-defibrillator) in place    Hypertension    Benign essential hypertension    Cardiomyopathy ([address] )    Hyperlipidemia    NSVT (nonsustained ventricular tachycardia) ([address] )    Pacemaker     Past Medical History:   Diagnosis Date    Bradycardia     Brugada syndrome     Cardiac disease     Hyperlipidemia     Hypertension     ICD (implantable cardioverter-defibrillator) in place     MI (myocardial infarction)     per pt report    Pacemaker      Social History     Social History    Marital status: Single     Spouse name: N/A    Number of children: N/A    Years of education: N/A     Occupational History    Not on file  Social History Main Topics    Smoking status: Former Smoker     Quit date: 1/19/2006    Smokeless tobacco: Never Used    Alcohol use 2 4 oz/week     2 Cans of beer, 2 Glasses of wine per week      Comment: Social    Drug use: No    Sexual activity: Not on file     Other Topics Concern    Not on file     Social History Narrative    No narrative on file      Family History   Problem Relation Age of Onset    Heart disease Mother      Past Surgical History:   Procedure Laterality Date    CARDIAC CATHETERIZATION      CARDIAC DEFIBRILLATOR PLACEMENT      CARDIAC PACEMAKER PLACEMENT      LUMBAR FUSION         Current Outpatient Prescriptions:     aspirin 81 mg chewable tablet, Chew 1 tablet daily for 30 days (Patient taking differently: Chew 81 mg 2 (two) times a day  ), Disp: 30 tablet, Rfl: 0    atorvastatin (LIPITOR) 40 mg tablet, Take 1 tablet by mouth daily with dinner for 30 days, Disp: 30 tablet, Rfl: 0    clopidogrel (PLAVIX) 75 mg tablet, Take 75 mg by mouth daily, Disp: , Rfl:     furosemide (LASIX) 20 mg tablet, Take 20 mg by mouth daily, Disp: , Rfl:     isosorbide mononitrate (IMDUR) 30 mg 24 hr tablet, Take 2 tabs (60 mg) in the AM with breakfast and 1 tab (30 mg) in the evening at dinnertime  , Disp: 90 tablet, Rfl: 0    lisinopril (ZESTRIL) 5 mg tablet, Take 5 mg by mouth daily, Disp: , Rfl:     metoprolol succinate (TOPROL-XL) 25 mg 24 hr tablet, Take 1 tablet by mouth 2 (two) times a day, Disp: 60 tablet, Rfl: 0    nitroglycerin (NITROSTAT) 0 4 mg SL tablet, Place 1 tablet under the tongue every 5 (five) minutes as needed for chest pain, Disp: 90 tablet, Rfl: 0  No Known Allergies  Vitals:    02/22/18 1659   BP: 130/80   BP Location: Right arm   Patient Position: Sitting   Cuff Size: Large   Pulse: 66   Weight: 103 kg (227 lb)   Height: 6' 1" (1 854 m)       Labs:  Admission on 01/19/2018, Discharged on 01/19/2018   Component Date Value    Ventricular Rate 01/19/2018 64     Atrial Rate 01/19/2018 64     SC Interval 01/19/2018 162     QRSD Interval 01/19/2018 174     QT Interval 01/19/2018 466     QTC Interval 01/19/2018 480     P Axis 01/19/2018 56     QRS Axis 01/19/2018 18     T Wave Axis 01/19/2018 -28     Sodium 01/19/2018 135*    Potassium 01/19/2018 4 0     Chloride 01/19/2018 105     CO2 01/19/2018 27     Anion Gap 01/19/2018 3*    BUN 01/19/2018 20     Creatinine 01/19/2018 0 94     Glucose 01/19/2018 100     Glucose, Fasting 01/19/2018 100*    Calcium 01/19/2018 8 7     eGFR 01/19/2018 92     WBC 01/19/2018 6 30     RBC 01/19/2018 4 86     Hemoglobin 01/19/2018 14 8     Hematocrit 01/19/2018 41 7     MCV 01/19/2018 86     MCH 01/19/2018 30 5     MCHC 01/19/2018 35 5     RDW 01/19/2018 12 6     Platelets 14/43/0475 171     MPV 01/19/2018 9 8     Cholesterol 01/19/2018 113     Triglycerides 01/19/2018 157*    HDL, Direct 01/19/2018 30*    LDL Calculated 01/19/2018 52     Magnesium 01/19/2018 2 1     Protime 01/19/2018 14 3     INR 01/19/2018 1 11    Hospital Outpatient Visit on 01/11/2018   Component Date Value    Protocol Name 01/11/2018 [facility]     Time In Exercise Phase 01/11/2018 00:03:00     MAX  SYSTOLIC BP 20/09/6531 615     Max Diastolic Bp 28/83/2915 74     Max Heart Rate 01/11/2018 105     Max Predicted Heart Rate 01/11/2018 166     Reason for Termination 01/11/2018 TEST COMPLETE        Test Indication 01/11/2018 Dyspnea     Target Hr Formular 01/11/2018 (220 - Age)*100%     Arrhy During Ex 01/11/2018 none     Chest Pain Statement 01/11/2018 none    Generic Conversion - Encounter on 01/10/2018   Component Date Value    BNP 01/10/2018 12 6      Imaging: No results found  Review of Systems:  Review of Systems   Positive for chest pain, palpitations, shortness of breath    All other 12 point ros negative    Physical Exam:  Physical Exam     GEN: NAD, Alert and oriented, well appearing  HEENT:Head, neck, ears, oral pharynx: Mucus membranes moist, oral pharynx clear, nares clear  External ears normal  EYES: Pupils equal, sclera anicteric  NECK: No JVD  CARDIOVASCULAR: RRR, No murmur, rub, gallops S1,S2  LUNGS: Clear To auscultation bilaterally, no rales, no rhonchi, no wheezes  ABDOMEN: Soft, nondistended  EXTREMITIES/VASCULAR: No edema  PSYCH: Normal Affect  NEURO: Grossly intact, moving all extremiteis equal, face symetric  HEME: No bleeding, bruising, petechia  SKIN: No significant rashes        Discussion/Summary:1) ? brugada syndrome - recommend genetic testing plus minus procainamide challenge  Not clear to me that he truly has brugada syndrome    2) icd in situ working appropriately    3) palpitations - needs event monitor    4)  Chest pain not clear etiology  ? Benefit of ranexa  Maybe calcium blocker    Not likely arrhythmic in nature

## 2018-03-07 NOTE — PROGRESS NOTES
"  Discussion/Summary  Normal device function      Results/Data  Cardiac Device In Clinic [id number] 01:31PSHA Casper     Test Name Result Flag Reference   MISCELLANEOUS COMMENT      DEVICE INTERROGATED IN THE [facility] OFFICE  [id number] VOLTAGE ADEQUATE   <1%  ALL LEAD PARAMETERS WITHIN NORMAL LIMITS  NO SIGNIFICANT HIGH RATE EPISODES  NO PROGRAMMING CHANGES MADE TO DEVICE PARAMETERS  NORMAL DEVICE FUNCTION  ---WARNER   Cardiac Electrophysiology Report      [id number] [id number]  pdf   DEVICE TYPE ICD       Cardiac Electrophysiology Report [id number] 01:31PM Stefanie Casper     Test Name Result Flag Reference   Cardiac Electrophysiology Report      [id number]  pdf     Signatures   Electronically signed by : Kai Schuster, ; Jun 19 2017 10:41AM EST                       (Author)    Electronically signed by : Sai Aleman DO; Jun 19 2017 10:42AM EST                       (Author)    "

## 2018-03-07 NOTE — PROGRESS NOTES
"  Discussion/Summary  Normal device function      Results/Data  Cardiac Device In Clinic [id number] 09:17PM Christos Imtiaz     Test Name Result Flag Reference   MISCELLANEOUS COMMENT (Report)     DEVICE INTERROGATED IN THE [facility] OFFICE  BATTERY VOLTAGE NEARING SUZAN (2 65V/RRT=2 63V)  WILL SCHEDULE MONTHLY BATTERY CHECKS  1 MON VT EPISODE WITH EGM SHOWING SVT FOR 27 SEC  @ 174 BPM  NO PROGRAMMING CHANGES MADE TO DEVICE PARAMETERS  NORMAL DEVICE FUNCTION  RG   Cardiac Electrophysiology Report      [id number] [id number] [id number]  pdf   DEVICE TYPE ICD       Cardiac Electrophysiology Report [id number] 09:17PM Christos Imtiaz     Test Name Result Flag Reference   Cardiac Electrophysiology Report      [id number]  pdf     Signatures   Electronically signed by : Eitan Rob, ; Enrique 15 2018  3:32PM EST                       (Author)    Electronically signed by : Vannessa Fu DO; Enrique 15 2018  8:19PM EST                       (Author)    "

## 2018-03-12 ENCOUNTER — OFFICE VISIT (OUTPATIENT)
Dept: FAMILY MEDICINE CLINIC | Facility: CLINIC | Age: 53
End: 2018-03-12
Payer: COMMERCIAL

## 2018-03-12 VITALS
DIASTOLIC BLOOD PRESSURE: 70 MMHG | SYSTOLIC BLOOD PRESSURE: 104 MMHG | OXYGEN SATURATION: 98 % | WEIGHT: 228 LBS | BODY MASS INDEX: 30.22 KG/M2 | RESPIRATION RATE: 16 BRPM | HEART RATE: 104 BPM | HEIGHT: 73 IN

## 2018-03-12 DIAGNOSIS — I25.110 CORONARY ARTERY DISEASE INVOLVING NATIVE HEART WITH UNSTABLE ANGINA PECTORIS, UNSPECIFIED VESSEL OR LESION TYPE (HCC): ICD-10-CM

## 2018-03-12 DIAGNOSIS — I49.8 BRUGADA SYNDROME: Primary | ICD-10-CM

## 2018-03-12 DIAGNOSIS — I10 BENIGN ESSENTIAL HYPERTENSION: ICD-10-CM

## 2018-03-12 DIAGNOSIS — E78.5 HYPERLIPIDEMIA, UNSPECIFIED HYPERLIPIDEMIA TYPE: ICD-10-CM

## 2018-03-12 DIAGNOSIS — Z95.0 PACEMAKER: ICD-10-CM

## 2018-03-12 DIAGNOSIS — R73.03 PREDIABETES: ICD-10-CM

## 2018-03-12 DIAGNOSIS — I25.5 ISCHEMIC CARDIOMYOPATHY: ICD-10-CM

## 2018-03-12 PROCEDURE — 99213 OFFICE O/P EST LOW 20 MIN: CPT | Performed by: FAMILY MEDICINE

## 2018-03-12 NOTE — PROGRESS NOTES
Assessment/Plan:     patient's chronic medical conditions including hypertension hyperlipidemia and pre diabetes are all stable  patient's LDL are at goal     Patient and his wife were waiting eagerly to hear back from Dr Charlee Ashton office re further instructions  Both pt and her wife reassured  They are not sure if they will contact a provider in [facility] who specializes in Brugada syndrome  I wish them all the best, and will follow pt up afetr labs in 6 months  Diagnoses and all orders for this visit:    Brugada syndrome  -     CBC and differential; Future    Ischemic cardiomyopathy  -     CBC and differential; Future  -     Comprehensive metabolic panel; Future  -     TSH, 3rd generation with T4 reflex    Coronary artery disease involving native heart with unstable angina pectoris, unspecified vessel or lesion type (HCC)    Benign essential hypertension  -     Microalbumin / creatinine urine ratio; Future    Hyperlipidemia, unspecified hyperlipidemia type  -     Lipid panel; Future    Pacemaker    Prediabetes  -     Comprehensive metabolic panel; Future  -     HEMOGLOBIN A1C W/ EAG ESTIMATION; Future          Subjective:      Patient ID: Nikolas Thomas is a 45 y o  male  Patient is here for his three-month follow-up  He has multiple cardiac issues including CAD, Mi, Brugada syndrome, ICD  Patient usually follows up with the cardiology team   He  Was recently evaluated by electrophysiologist,  And is waiting to hear back from them  He   Continues to experience shortness of breath on minimal exertion,   Sometimes these episodes are associated with lightheadedness  Patient states that his symptoms resolve spontaneously after he rests for some time  his other chronic   Medical condition remains stable  Hypertension   This is a chronic problem  The current episode started more than 1 year ago  The problem has been gradually improving since onset  The problem is controlled   Associated symptoms include shortness of breath  Pertinent negatives include no anxiety, blurred vision, chest pain, headaches, palpitations, peripheral edema or sweats  There are no associated agents to hypertension  Risk factors: Brugada syndrome, MI, ICD  Treatments tried: PLAVIX, LASIX 20 MILLIGRAM, LISINOPRIL 5 MILLIGRAM, METOPROLOL XR 25 MILLIGRAM, ASPIRIN 81 MILLIGRAM, NITROSTAT AS NEEDED  The current treatment provides significant improvement  There are no compliance problems  Hyperlipidemia   This is a chronic problem  The current episode started more than 1 year ago  The problem is controlled  Recent lipid tests were reviewed and are normal (LDl 52)  Associated symptoms include shortness of breath  Pertinent negatives include no chest pain, focal weakness, leg pain or myalgias  Current antihyperlipidemic treatment includes statins  The current treatment provides significant improvement of lipids  There are no compliance problems  Past Medical History:   Diagnosis Date    Bradycardia     Brugada syndrome     Cardiac disease     Hyperlipidemia     Hypertension     ICD (implantable cardioverter-defibrillator) in place     MI (myocardial infarction)     per pt report    Pacemaker        Family History   Problem Relation Age of Onset    Heart disease Mother        Past Surgical History:   Procedure Laterality Date    CARDIAC CATHETERIZATION      CARDIAC DEFIBRILLATOR PLACEMENT      CARDIAC PACEMAKER PLACEMENT      LUMBAR FUSION      NO PAST SURGERIES          reports that he quit smoking about 12 years ago  He has never used smokeless tobacco  He reports that he drinks about 2 4 oz of alcohol per week   He reports that he does not use drugs        Current Outpatient Prescriptions:     clopidogrel (PLAVIX) 75 mg tablet, Take 75 mg by mouth daily, Disp: , Rfl:     furosemide (LASIX) 20 mg tablet, Take 20 mg by mouth daily, Disp: , Rfl:     isosorbide mononitrate (IMDUR) 30 mg 24 hr tablet, Take 2 tabs (60 mg) in the AM with breakfast and 1 tab (30 mg) in the evening at dinnertime  , Disp: 90 tablet, Rfl: 0    lisinopril (ZESTRIL) 5 mg tablet, Take 5 mg by mouth daily, Disp: , Rfl:     metoprolol succinate (TOPROL-XL) 25 mg 24 hr tablet, Take 1 tablet by mouth 2 (two) times a day, Disp: 60 tablet, Rfl: 0    nitroglycerin (NITROSTAT) 0 4 mg SL tablet, Place 1 tablet under the tongue every 5 (five) minutes as needed for chest pain, Disp: 90 tablet, Rfl: 0    aspirin 81 mg chewable tablet, Chew 1 tablet daily for 30 days (Patient taking differently: Chew 81 mg 2 (two) times a day  ), Disp: 30 tablet, Rfl: 0    atorvastatin (LIPITOR) 40 mg tablet, Take 1 tablet by mouth daily with dinner for 30 days, Disp: 30 tablet, Rfl: 0    The following portions of the patient's history were reviewed and updated as appropriate: allergies, current medications, past family history, past medical history, past social history, past surgical history and problem list     Review of Systems   Constitutional: Negative  Eyes: Negative for blurred vision  Respiratory: Positive for shortness of breath  Cardiovascular: Negative  Negative for chest pain and palpitations  Gastrointestinal: Negative  Musculoskeletal: Negative  Negative for myalgias  Neurological: Negative  Negative for focal weakness and headaches  Psychiatric/Behavioral: Negative  Objective:    /70   Pulse 104   Resp 16   Ht 6' 1" (1 854 m)   Wt 103 kg (228 lb)   SpO2 98%   BMI 30 08 kg/m²      Physical Exam   Constitutional: He is oriented to person, place, and time  He appears well-developed and well-nourished  Cardiovascular: Normal rate, regular rhythm and normal heart sounds  Pulmonary/Chest: Effort normal and breath sounds normal    Abdominal: Soft  Bowel sounds are normal    Neurological: He is alert and oriented to person, place, and time     Psychiatric: His behavior is normal  Judgment normal          No results found for this or any previous visit (from the past 1008 hour(s))

## 2018-03-13 ENCOUNTER — TELEPHONE (OUTPATIENT)
Dept: CARDIOLOGY CLINIC | Facility: CLINIC | Age: 53
End: 2018-03-13

## 2018-03-13 NOTE — TELEPHONE ENCOUNTER
Patient's wife called  They are waiting to hear back from you  Liz Freeman would hear from you in 3 weeks but I am not sure what about  Do you want genetic testing on this patient also? ? If so I can start the process  They are very concerned about whether he truly has Brugada especially to have his children tested as well  Please let me know plan for this patient and whether he needs genetic testing? Thanks

## 2018-03-13 NOTE — TELEPHONE ENCOUNTER
Yes please arrange genetic testing for brugada  Please apologize on my half for the delay  Also arrange for procainamide challenge which would help for diagnosis of brugada and may allow me to start ranexa  Please tell them this does no look like brugada to me so far

## 2018-04-18 ENCOUNTER — TELEPHONE (OUTPATIENT)
Dept: CARDIOLOGY CLINIC | Facility: CLINIC | Age: 53
End: 2018-04-18

## 2018-04-18 ENCOUNTER — OFFICE VISIT (OUTPATIENT)
Dept: CARDIOLOGY CLINIC | Facility: CLINIC | Age: 53
End: 2018-04-18
Payer: COMMERCIAL

## 2018-04-18 VITALS
HEART RATE: 60 BPM | WEIGHT: 226.7 LBS | HEIGHT: 73 IN | BODY MASS INDEX: 30.05 KG/M2 | OXYGEN SATURATION: 98 % | DIASTOLIC BLOOD PRESSURE: 78 MMHG | SYSTOLIC BLOOD PRESSURE: 126 MMHG

## 2018-04-18 DIAGNOSIS — E78.5 HYPERLIPIDEMIA, UNSPECIFIED HYPERLIPIDEMIA TYPE: ICD-10-CM

## 2018-04-18 DIAGNOSIS — I42.9 CARDIOMYOPATHY, UNSPECIFIED TYPE (HCC): Primary | ICD-10-CM

## 2018-04-18 DIAGNOSIS — I47.2 NSVT (NONSUSTAINED VENTRICULAR TACHYCARDIA) (HCC): ICD-10-CM

## 2018-04-18 DIAGNOSIS — I49.8 BRUGADA SYNDROME: ICD-10-CM

## 2018-04-18 DIAGNOSIS — Z95.810 ICD (IMPLANTABLE CARDIOVERTER-DEFIBRILLATOR) IN PLACE: ICD-10-CM

## 2018-04-18 DIAGNOSIS — I10 BENIGN ESSENTIAL HYPERTENSION: ICD-10-CM

## 2018-04-18 DIAGNOSIS — I10 ESSENTIAL HYPERTENSION: ICD-10-CM

## 2018-04-18 PROBLEM — I50.20 HFREF (HEART FAILURE WITH REDUCED EJECTION FRACTION) (HCC): Status: ACTIVE | Noted: 2017-04-11

## 2018-04-18 PROCEDURE — 99214 OFFICE O/P EST MOD 30 MIN: CPT | Performed by: INTERNAL MEDICINE

## 2018-04-18 PROCEDURE — 93000 ELECTROCARDIOGRAM COMPLETE: CPT | Performed by: INTERNAL MEDICINE

## 2018-04-18 RX ORDER — ISOSORBIDE MONONITRATE 60 MG/1
60 TABLET, EXTENDED RELEASE ORAL DAILY
COMMUNITY
End: 2018-06-19 | Stop reason: SDUPTHER

## 2018-04-18 NOTE — TELEPHONE ENCOUNTER
Patient's wife Bell Hubbard called  Patient saw Dr Isac Maki for follow-up who did recommend that patient have the Procainamide Challenge test you wanted to order  Patient will be having his genetic testing done soon as application has been completed  Patient does want to go forward with Procainamide Challenge  ? Ok to order and schedule for patient?

## 2018-04-18 NOTE — PROGRESS NOTES
Cardiology Follow Up    [facility]  1965  [id number]  [address] [facility] [place] [id number]    1  Cardiomyopathy, unspecified type ([address] )  POCT ECG   2  Benign essential hypertension  POCT ECG   3  Brugada syndrome     4  Essential hypertension     5  ICD (implantable cardioverter-defibrillator) in place     6  Hyperlipidemia, unspecified hyperlipidemia type     7  NSVT (nonsustained ventricular tachycardia) ([facility])         Discussion/Summary:  Overall he has been feeling better since our last appointment  He was seen by electrophysiology in will be scheduled for procainamide challenge to more clearly delineate if he truly has brugada  ICD interrogations will be followed  He will follow up with EP as well  His coronary artery disease stable with no complaints of angina  Recent left heart catheterization was reviewed  Blood pressures been well controlled  Lipids are doing well as current intensity statin  No further cardiac testing other than procainamide challenge  He will also look in to genetic testing  Interval History:   Follow-up visit overall he has been feeling better with more energy following our last visit  The episodes of lightheadedness and shortness of breath have improved but have not resolved  He denies any chest pain or discomfort there has been no palpitations, lightheadedness, syncope  He denies any lower extremity edema, PND, orthopnea  He was seen by Dr Roby Quevedo in will be scheduled for procainamide challenge to see if he truly has brugada  Problem List     Dyspnea on exertion    Dizziness    Brugada syndrome    Overview Addendum 4/18/2018  7:34 AM by Slime Garcia DO     Last Assessment & Plan:   Follows with device clinic           Ischemic cardiomyopathy    Coronary atherosclerosis    Overview Signed 4/18/2018  7:34 AM by Slime Garcia DO Overview:   Cath @ [facility]: OM1 - 100% , unable to revascularize  LAD 30%, RCA 40%  EF: 40%    Last Assessment & Plan:   Continues with left sided chest discomfort  He will try to increase Imdur to 30mg TID to see if he can tolerate increased dosing  If unable will go back down to twice per day  Chest pain    MI (myocardial infarction) [facility])    Overview Signed 6/14/2017  6:51 PM by Cori Wynn PA-C     per pt report         ICD (implantable cardioverter-defibrillator) in place    Hypertension    Benign essential hypertension    Overview Signed 2/5/2018  7:56 AM by Scot Carmona DO     Last Assessment & Plan:   Controlled  Cardiomyopathy ([facility] )    Hyperlipidemia    NSVT (nonsustained ventricular tachycardia) ([facility] )    Pacemaker    Overview Signed 2/5/2018  7:56 AM by Scot Carmona DO     Last [address] [place] [address]:   Follows with device clinic         HFrEF (heart failure with reduced ejection fraction) ([facility] )    Overview Signed 4/18/2018  7:34 AM by Scot Carmona DO     Last Assessment & Plan:   NYHA Class II  Appears euvolemic  Will continue lasix use as this improved his symptoms  Will need to continue to up titrate his medications however due to continued chest discomfort will try an increase in Imdur first  If tolerated he will increase lisinopril to 10mg daily  BMP next week  We discussed the importance of daily weights, adherence to a 2gm sodium dietary restriction, s/s of heart failure and when to call our office  Past Medical History:   Diagnosis Date    Bradycardia     Brugada syndrome     Cardiac disease     Hyperlipidemia     Hypertension     ICD (implantable cardioverter-defibrillator) in place     MI (myocardial infarction) ([facility] )     per pt report    Pacemaker      Social History     Social History    Marital status: Single     Spouse name: N/A    Number of children: N/A    Years of education: N/A     Occupational History    Not on file  Social History Main Topics    Smoking status: Former Smoker     Quit date: 1/19/2006    Smokeless tobacco: Never Used    Alcohol use 2 4 oz/week     2 Cans of beer, 2 Glasses of wine per week      Comment: Social    Drug use: No    Sexual activity: Not on file     Other Topics Concern    Not on file     Social History Narrative    No narrative on file      Family History   Problem Relation Age of Onset    Heart disease Mother      Past Surgical History:   Procedure Laterality Date    CARDIAC CATHETERIZATION      CARDIAC DEFIBRILLATOR PLACEMENT      CARDIAC PACEMAKER PLACEMENT      LUMBAR FUSION      NO PAST SURGERIES         Current Outpatient Prescriptions:     aspirin 81 mg chewable tablet, Chew 1 tablet daily for 30 days (Patient taking differently: Chew 81 mg 2 (two) times a day  ), Disp: 30 tablet, Rfl: 0    atorvastatin (LIPITOR) 40 mg tablet, Take 1 tablet by mouth daily with dinner for 30 days, Disp: 30 tablet, Rfl: 0    clopidogrel (PLAVIX) 75 mg tablet, Take 75 mg by mouth daily, Disp: , Rfl:     furosemide (LASIX) 20 mg tablet, Take 20 mg by mouth daily, Disp: , Rfl:     isosorbide mononitrate (IMDUR) 60 mg 24 hr tablet, Take 60 mg by mouth daily, Disp: , Rfl:     lisinopril (ZESTRIL) 5 mg tablet, Take 5 mg by mouth daily, Disp: , Rfl:     metoprolol succinate (TOPROL-XL) 25 mg 24 hr tablet, Take 1 tablet by mouth 2 (two) times a day, Disp: 60 tablet, Rfl: 0    nitroglycerin (NITROSTAT) 0 4 mg SL tablet, Place 1 tablet under the tongue every 5 (five) minutes as needed for chest pain, Disp: 90 tablet, Rfl: 0  No Known Allergies    Labs:     Chemistry        Component Value Date/Time     (L) 01/19/2018 0905     05/23/2017 1055    K 4 0 01/19/2018 0905    K 4 1 05/23/2017 1055     01/19/2018 0905     05/23/2017 1055    CO2 27 01/19/2018 0905    CO2 22 05/23/2017 1055    BUN 20 01/19/2018 0905    BUN 13 05/23/2017 1055    CREATININE 0 94 01/19/2018 0905 CREATININE 0 95 05/23/2017 1055        Component Value Date/Time    CALCIUM 8 7 01/19/2018 0905    CALCIUM 9 7 05/23/2017 1055    ALKPHOS 72 06/15/2017 0541    ALKPHOS 81 05/23/2017 1055    AST 25 06/15/2017 0541    AST 27 05/23/2017 1055    ALT 33 06/15/2017 0541    ALT 28 05/23/2017 1055    BILITOT 0 90 06/15/2017 0541    BILITOT 0 7 05/23/2017 1055            Lab Results   Component Value Date    CHOL 113 01/19/2018    CHOL 107 06/15/2017    CHOL 159 04/06/2017     Lab Results   Component Value Date    HDL 30 (L) 01/19/2018    HDL 33 (L) 06/15/2017    HDL 32 (L) 04/06/2017     Lab Results   Component Value Date    LDLCALC 52 01/19/2018    LDLCALC 61 06/15/2017    LDLCALC 107 (H) 04/06/2017     Lab Results   Component Value Date    TRIG 157 (H) 01/19/2018    TRIG 63 06/15/2017    TRIG 102 04/06/2017     No components found for: CHOLHDL    Imaging: No results found  ECG:  Sinus rhythm right bundle branch block nonspecific lateral T-wave changes      ROS    Vitals:    04/18/18 1357   BP: 126/78   Pulse: 60   SpO2: 98%     Vitals:    04/18/18 1357   Weight: 103 kg (226 lb 11 2 oz)     Height: 6' 1" (185 4 cm)   Body mass index is 29 91 kg/m²      Physical Exam:   General appearance:  Appears stated age, alert, well appearing and in no distress  HEENT:  PERRLA, EOMI, no scleral icterus, no conjunctival pallor  NECK:  Supple, No elevated JVP, no thyromegaly, no carotid bruits  HEART:  Regular rate and rhythm, normal S1/S2, no S3/S4, no murmur or rub  LUNGS:  Clear to auscultation bilaterally, no wheezes rales or rhonchi  ABDOMEN:  Soft, non-tender, positive bowel sounds, no rebound or guarding, no organomegaly   EXTREMITIES:  No edema, normal range of motion  VASCULAR:  Normal pedal pulses, good pulse volume   SKIN: No lesions or rashes on exposed skin  NEURO:  CN II-XII intact, no focal deficits

## 2018-04-19 DIAGNOSIS — I49.8 BRUGADA SYNDROME: Primary | ICD-10-CM

## 2018-04-20 NOTE — TELEPHONE ENCOUNTER
I did put order in EPIC for Scheduling  Please call patient's wife Marcie Kraft at [phone]  Thanks

## 2018-05-10 DIAGNOSIS — I25.10 CORONARY ARTERY DISEASE INVOLVING NATIVE HEART WITHOUT ANGINA PECTORIS, UNSPECIFIED VESSEL OR LESION TYPE: Primary | ICD-10-CM

## 2018-05-11 RX ORDER — ATORVASTATIN CALCIUM 40 MG/1
TABLET, FILM COATED ORAL
Qty: 90 TABLET | Refills: 3 | Status: SHIPPED | OUTPATIENT
Start: 2018-05-11 | End: 2018-06-19 | Stop reason: SDUPTHER

## 2018-05-11 RX ORDER — CLOPIDOGREL BISULFATE 75 MG/1
TABLET ORAL
Qty: 90 TABLET | Refills: 3 | Status: SHIPPED | OUTPATIENT
Start: 2018-05-11 | End: 2018-06-25 | Stop reason: SDUPTHER

## 2018-05-19 ENCOUNTER — APPOINTMENT (EMERGENCY)
Dept: RADIOLOGY | Facility: HOSPITAL | Age: 53
End: 2018-05-19
Payer: COMMERCIAL

## 2018-05-19 ENCOUNTER — HOSPITAL ENCOUNTER (OUTPATIENT)
Facility: HOSPITAL | Age: 53
Setting detail: OBSERVATION
Discharge: HOME | End: 2018-05-21
Attending: EMERGENCY MEDICINE | Admitting: INTERNAL MEDICINE
Payer: COMMERCIAL

## 2018-05-19 DIAGNOSIS — R07.9 CHEST PAIN, UNSPECIFIED TYPE: Primary | ICD-10-CM

## 2018-05-19 DIAGNOSIS — I49.8 BRUGADA SYNDROME: ICD-10-CM

## 2018-05-19 DIAGNOSIS — I25.5 ISCHEMIC CARDIOMYOPATHY: ICD-10-CM

## 2018-05-19 LAB
ALBUMIN SERPL BCP-MCNC: 4 G/DL (ref 3.5–5)
ALP SERPL-CCNC: 79 U/L (ref 46–116)
ALT SERPL W P-5'-P-CCNC: 51 U/L (ref 12–78)
ANION GAP SERPL CALCULATED.3IONS-SCNC: 11 MMOL/L (ref 4–13)
APTT PPP: 27 SECONDS (ref 24–36)
AST SERPL W P-5'-P-CCNC: 31 U/L (ref 5–45)
BASOPHILS # BLD AUTO: 0.04 THOUSANDS/ΜL (ref 0–0.1)
BASOPHILS NFR BLD AUTO: 1 % (ref 0–1)
BILIRUB SERPL-MCNC: 0.7 MG/DL (ref 0.2–1)
BUN SERPL-MCNC: 17 MG/DL (ref 5–25)
CALCIUM SERPL-MCNC: 9.1 MG/DL (ref 8.3–10.1)
CHLORIDE SERPL-SCNC: 101 MMOL/L (ref 100–108)
CO2 SERPL-SCNC: 24 MMOL/L (ref 21–32)
CREAT SERPL-MCNC: 0.97 MG/DL (ref 0.6–1.3)
DEPRECATED D DIMER PPP: 346 NG/ML (FEU) (ref 0–424)
EOSINOPHIL # BLD AUTO: 0.22 THOUSAND/ΜL (ref 0–0.61)
EOSINOPHIL NFR BLD AUTO: 3 % (ref 0–6)
ERYTHROCYTE [DISTWIDTH] IN BLOOD BY AUTOMATED COUNT: 12.7 % (ref 11.6–15.1)
EST. AVERAGE GLUCOSE BLD GHB EST-MCNC: 123 MG/DL
GFR SERPL CREATININE-BSD FRML MDRD: 88 ML/MIN/1.73SQ M
GLUCOSE SERPL-MCNC: 170 MG/DL (ref 65–140)
HBA1C MFR BLD: 5.9 % (ref 4.2–6.3)
HCT VFR BLD AUTO: 40.7 % (ref 36.5–49.3)
HGB BLD-MCNC: 14.6 G/DL (ref 12–17)
INR PPP: 1.04 (ref 0.86–1.17)
LYMPHOCYTES # BLD AUTO: 3.01 THOUSANDS/ΜL (ref 0.6–4.47)
LYMPHOCYTES NFR BLD AUTO: 41 % (ref 14–44)
MCH RBC QN AUTO: 30.2 PG (ref 26.8–34.3)
MCHC RBC AUTO-ENTMCNC: 35.9 G/DL (ref 31.4–37.4)
MCV RBC AUTO: 84 FL (ref 82–98)
MONOCYTES # BLD AUTO: 0.62 THOUSAND/ΜL (ref 0.17–1.22)
MONOCYTES NFR BLD AUTO: 8 % (ref 4–12)
NEUTROPHILS # BLD AUTO: 3.55 THOUSANDS/ΜL (ref 1.85–7.62)
NEUTS SEG NFR BLD AUTO: 48 % (ref 43–75)
PLATELET # BLD AUTO: 189 THOUSANDS/UL (ref 149–390)
PLATELET # BLD AUTO: 198 THOUSANDS/UL (ref 149–390)
PMV BLD AUTO: 10.2 FL (ref 8.9–12.7)
PMV BLD AUTO: 10.3 FL (ref 8.9–12.7)
POTASSIUM SERPL-SCNC: 3.4 MMOL/L (ref 3.5–5.3)
PROT SERPL-MCNC: 7.5 G/DL (ref 6.4–8.2)
PROTHROMBIN TIME: 13.3 SECONDS (ref 11.8–14.2)
RBC # BLD AUTO: 4.84 MILLION/UL (ref 3.88–5.62)
SODIUM SERPL-SCNC: 136 MMOL/L (ref 136–145)
TROPONIN I SERPL-MCNC: <0.02 NG/ML
WBC # BLD AUTO: 7.44 THOUSAND/UL (ref 4.31–10.16)

## 2018-05-19 PROCEDURE — 99285 EMERGENCY DEPT VISIT HI MDM: CPT

## 2018-05-19 PROCEDURE — 36415 COLL VENOUS BLD VENIPUNCTURE: CPT | Performed by: EMERGENCY MEDICINE

## 2018-05-19 PROCEDURE — 85049 AUTOMATED PLATELET COUNT: CPT | Performed by: INTERNAL MEDICINE

## 2018-05-19 PROCEDURE — 93005 ELECTROCARDIOGRAM TRACING: CPT

## 2018-05-19 PROCEDURE — 80053 COMPREHEN METABOLIC PANEL: CPT | Performed by: EMERGENCY MEDICINE

## 2018-05-19 PROCEDURE — 85025 COMPLETE CBC W/AUTO DIFF WBC: CPT | Performed by: EMERGENCY MEDICINE

## 2018-05-19 PROCEDURE — 85610 PROTHROMBIN TIME: CPT | Performed by: EMERGENCY MEDICINE

## 2018-05-19 PROCEDURE — 99220 PR INITIAL OBSERVATION CARE/DAY 70 MINUTES: CPT | Performed by: INTERNAL MEDICINE

## 2018-05-19 PROCEDURE — 85730 THROMBOPLASTIN TIME PARTIAL: CPT | Performed by: EMERGENCY MEDICINE

## 2018-05-19 PROCEDURE — 83036 HEMOGLOBIN GLYCOSYLATED A1C: CPT | Performed by: INTERNAL MEDICINE

## 2018-05-19 PROCEDURE — 84484 ASSAY OF TROPONIN QUANT: CPT | Performed by: INTERNAL MEDICINE

## 2018-05-19 PROCEDURE — 84484 ASSAY OF TROPONIN QUANT: CPT | Performed by: EMERGENCY MEDICINE

## 2018-05-19 PROCEDURE — 71046 X-RAY EXAM CHEST 2 VIEWS: CPT

## 2018-05-19 PROCEDURE — 85379 FIBRIN DEGRADATION QUANT: CPT | Performed by: EMERGENCY MEDICINE

## 2018-05-19 RX ORDER — ISOSORBIDE MONONITRATE 60 MG/1
60 TABLET, EXTENDED RELEASE ORAL DAILY
Status: DISCONTINUED | OUTPATIENT
Start: 2018-05-19 | End: 2018-05-21 | Stop reason: HOSPADM

## 2018-05-19 RX ORDER — LISINOPRIL 5 MG/1
5 TABLET ORAL DAILY
Status: DISCONTINUED | OUTPATIENT
Start: 2018-05-19 | End: 2018-05-21 | Stop reason: HOSPADM

## 2018-05-19 RX ORDER — ATORVASTATIN CALCIUM 40 MG/1
40 TABLET, FILM COATED ORAL
Status: DISCONTINUED | OUTPATIENT
Start: 2018-05-19 | End: 2018-05-21 | Stop reason: HOSPADM

## 2018-05-19 RX ORDER — CLOPIDOGREL BISULFATE 75 MG/1
75 TABLET ORAL DAILY
Status: DISCONTINUED | OUTPATIENT
Start: 2018-05-19 | End: 2018-05-21 | Stop reason: HOSPADM

## 2018-05-19 RX ORDER — POTASSIUM CHLORIDE 20 MEQ/1
40 TABLET, EXTENDED RELEASE ORAL ONCE
Status: COMPLETED | OUTPATIENT
Start: 2018-05-19 | End: 2018-05-19

## 2018-05-19 RX ORDER — ACETAMINOPHEN 325 MG/1
650 TABLET ORAL EVERY 4 HOURS PRN
Status: DISCONTINUED | OUTPATIENT
Start: 2018-05-19 | End: 2018-05-21 | Stop reason: HOSPADM

## 2018-05-19 RX ORDER — ASPIRIN 81 MG/1
81 TABLET, CHEWABLE ORAL 2 TIMES DAILY
Status: DISCONTINUED | OUTPATIENT
Start: 2018-05-19 | End: 2018-05-21 | Stop reason: HOSPADM

## 2018-05-19 RX ORDER — NITROGLYCERIN 0.4 MG/1
0.4 TABLET SUBLINGUAL
Status: DISCONTINUED | OUTPATIENT
Start: 2018-05-19 | End: 2018-05-21 | Stop reason: HOSPADM

## 2018-05-19 RX ORDER — FUROSEMIDE 20 MG/1
20 TABLET ORAL DAILY
Status: DISCONTINUED | OUTPATIENT
Start: 2018-05-19 | End: 2018-05-21 | Stop reason: HOSPADM

## 2018-05-19 RX ORDER — METOPROLOL SUCCINATE 25 MG/1
25 TABLET, EXTENDED RELEASE ORAL 2 TIMES DAILY
Status: DISCONTINUED | OUTPATIENT
Start: 2018-05-19 | End: 2018-05-21 | Stop reason: HOSPADM

## 2018-05-19 RX ADMIN — ENOXAPARIN SODIUM 40 MG: 40 INJECTION SUBCUTANEOUS at 16:23

## 2018-05-19 RX ADMIN — NITROGLYCERIN 1 INCH: 20 OINTMENT TOPICAL at 12:05

## 2018-05-19 RX ADMIN — ATORVASTATIN CALCIUM 40 MG: 40 TABLET, FILM COATED ORAL at 16:23

## 2018-05-19 RX ADMIN — POTASSIUM CHLORIDE 40 MEQ: 1500 TABLET, EXTENDED RELEASE ORAL at 16:23

## 2018-05-19 RX ADMIN — METOPROLOL SUCCINATE 25 MG: 25 TABLET, EXTENDED RELEASE ORAL at 18:55

## 2018-05-19 RX ADMIN — ASPIRIN 81 MG 81 MG: 81 TABLET ORAL at 18:55

## 2018-05-19 NOTE — ED PROCEDURE NOTE
PROCEDURE  ECG 12 Lead Documentation  Date/Time: 5/19/2018 12:19 PM  Performed by: Beto Wyman  Authorized by: Jaylyn CHISHOLM     Indications / Diagnosis:  CP  ECG reviewed by me, the ED Provider: yes    Patient location:  ED and bedside  Previous ECG:     Previous ECG:  Compared to current    Comparison ECG info:  1/19/18    Similarity:  No change    Comparison to cardiac monitor: Yes    Interpretation:     Interpretation: abnormal    Rate:     ECG rate:  93    ECG rate assessment: normal    Rhythm:     Rhythm: sinus rhythm    Ectopy:     Ectopy: none    QRS:     QRS axis:  Normal    QRS intervals:   Wide  Conduction:     Conduction: abnormal      Abnormal conduction: complete RBBB    ST segments:     ST segments:  Normal  T waves:     T waves: flattening      Flattening:  I, aVL, V1, V2, V3, V4 and V5  Q waves:     Q waves:  V1  Comments:      NO SIGNS OF ISCHEMIA/ INJURY         Chad Irby MD  05/19/18 2798

## 2018-05-19 NOTE — ED PROVIDER NOTES
History  Chief Complaint   Patient presents with    Chest Pain     pt reports pain started in his chest 1 hour ago  pt also has shortness of breath  feels like a "blet" on middle of his chest  pt feels that his heart rate has gone up     53 YR MLAE WITH HX OF BRUGADA SYNDROME/ AICD/CARDIAC STENTS-- [place]URN FROM [place]-- HAS FREQUENT CPP- STAES WORSE 1 HR AGO WITH ANT PRESSURE /SOB-- NO FEVER/UTI COUGH -- GRADUAL ONSET -- NO ABRUPT ONSET OF RIPPING/TEARING TYEP PAIN -- NO HX OF VTE- NO OTHER COMPS        History provided by:  Patient and spouse   used: No        Prior to Admission Medications   Prescriptions Last Dose Informant Patient Reported?  Taking?   aspirin 81 mg chewable tablet  Self No Yes   Sig: Chew 1 tablet daily for 30 days   Patient taking differently: Chew 81 mg 2 (two) times a day     atorvastatin (LIPITOR) 40 mg tablet   No Yes   Sig: TAKE 1 TABLET DAILY   clopidogrel (PLAVIX) 75 mg tablet   No Yes   Sig: TAKE 1 TABLET DAILY   furosemide (LASIX) 20 mg tablet  Self Yes Yes   Sig: Take 20 mg by mouth daily   isosorbide mononitrate (IMDUR) 60 mg 24 hr tablet  Self Yes Yes   Sig: Take 60 mg by mouth daily   lisinopril (ZESTRIL) 5 mg tablet  Self Yes Yes   Sig: Take 5 mg by mouth daily   metoprolol succinate (TOPROL-XL) 25 mg 24 hr tablet  Self No Yes   Sig: Take 1 tablet by mouth 2 (two) times a day   nitroglycerin (NITROSTAT) 0 4 mg SL tablet  Self No Yes   Sig: Place 1 tablet under the tongue every 5 (five) minutes as needed for chest pain      Facility-Administered Medications: None       Past Medical History:   Diagnosis Date    Bradycardia     Brugada syndrome     Cardiac disease     Hyperlipidemia     Hypertension     ICD (implantable cardioverter-defibrillator) in place     MI (myocardial infarction) ([facility] [address] [address] )     per pt report    Pacemaker        Past Surgical History:   Procedure Laterality Date   [address]  CARDIAC PACEMAKER PLACEMENT      LUMBAR FUSION      NO PAST SURGERIES         Family History   Problem Relation Age of Onset    Heart disease Mother      I have reviewed and agree with the history as documented  Social History   Substance Use Topics    Smoking status: Former Smoker     Quit date: 1/19/2006    Smokeless tobacco: Never Used    Alcohol use 2 4 oz/week     2 Cans of beer, 2 Glasses of wine per week      Comment: Social        Review of Systems   Constitutional: Negative  HENT: Negative  Eyes: Negative  Respiratory: Positive for shortness of breath  Negative for apnea, cough, choking, chest tightness, wheezing and stridor  Cardiovascular: Positive for chest pain  Negative for palpitations and leg swelling  Endocrine: Negative  Genitourinary: Negative  Allergic/Immunologic: Negative  Neurological: Negative  Hematological: Negative  Psychiatric/Behavioral: Negative  Physical Exam  Physical Exam   Constitutional: He is oriented to person, place, and time  He appears well-developed and well-nourished  He appears distressed  AVS- MILD TACHYCARDIA-- ANXIOUS APPEARING-- PULSE OX 98 % ON RA- INTERPRETATION IS NORMAL- NO INTERVENTION    HENT:   Head: Normocephalic and atraumatic  Eyes: Conjunctivae and EOM are normal  Pupils are equal, round, and reactive to light  Right eye exhibits no discharge  Left eye exhibits no discharge  No scleral icterus  MM PINK   Neck: Normal range of motion  Neck supple  No JVD present  No tracheal deviation present  No thyromegaly present  Cardiovascular: Regular rhythm, normal heart sounds and intact distal pulses  Exam reveals no gallop and no friction rub  No murmur heard  Pulmonary/Chest: Effort normal and breath sounds normal  No stridor  No respiratory distress  He has no wheezes  He has no rales  He exhibits no tenderness  Abdominal: Soft  Bowel sounds are normal  He exhibits no distension and no mass   There is no tenderness  There is no rebound and no guarding  No hernia  Musculoskeletal: Normal range of motion  He exhibits no edema, tenderness or deformity  EQUAL BILATERAL RADIAL/DP PULSES- NO BLE EDEMA/CALF TENDNRESS/ASSYM/ ERYTHEMA   Lymphadenopathy:     He has no cervical adenopathy  Neurological: He is alert and oriented to person, place, and time  No cranial nerve deficit or sensory deficit  He exhibits normal muscle tone  Coordination normal    Skin: Skin is warm  Capillary refill takes less than 2 seconds  No rash noted  He is not diaphoretic  No erythema  No pallor  Psychiatric: His behavior is normal  Judgment and thought content normal    ANXIOUS APPEARING   Nursing note and vitals reviewed  Vital Signs  ED Triage Vitals [05/19/18 1142]   Temp Pulse Respirations Blood Pressure SpO2   -- (!) 110 (!) 24 140/94 100 %      Temp Source Heart Rate Source Patient Position - Orthostatic VS BP Location FiO2 (%)   Oral Monitor -- Right arm --      Pain Score       8           Vitals:    05/19/18 1142   BP: 140/94   Pulse: (!) 110       Visual Acuity      ED Medications  Medications   nitroglycerin (NITRO-BID) 2 % TD ointment 1 inch (1 inch Topical Given 5/19/18 1205)       Diagnostic Studies  Results Reviewed     Procedure Component Value Units Date/Time    Troponin I [[id number]]  (Normal) Collected:  05/19/18 1147    Lab Status:  Final result Specimen:  Blood from Arm, Right Updated:  05/19/18 1211     Troponin I <0 02 ng/mL     Narrative:         [employer] Chemistry analyzer 99% cutoff is > 0 04 ng/mL in network labs    o cTnI 99% cutoff is useful only when applied to patients in the clinical setting of myocardial ischemia  o cTnI 99% cutoff should be interpreted in the context of clinical history, ECG findings and possibly cardiac imaging to establish correct diagnosis  o cTnI 99% cutoff may be suggestive but clearly not indicative of a coronary event without the clinical setting of myocardial ischemia  D-Dimer [[id number]] Collected:  05/19/18 1207    Lab Status:  No result Specimen:  Blood from Arm, Right     Protime-INR [[id number]]  (Normal) Collected:  05/19/18 1147    Lab Status:  Final result Specimen:  Blood from Arm, Right Updated:  05/19/18 1205     Protime 13 3 seconds      INR 1 04    APTT [[id number]]  (Normal) Collected:  05/19/18 1147    Lab Status:  Final result Specimen:  Blood from Arm, Right Updated:  05/19/18 1205     PTT 27 seconds     CBC and differential [[id number]]  (Normal) Collected:  05/19/18 1147    Lab Status:  Final result Specimen:  Blood from Arm, Right Updated:  05/19/18 1154     WBC 7 44 Thousand/uL      RBC 4 84 Million/uL      Hemoglobin 14 6 g/dL      Hematocrit 40 7 %      MCV 84 fL      MCH 30 2 pg      MCHC 35 9 g/dL      RDW 12 7 %      MPV 10 2 fL      Platelets 224 Thousands/uL      Neutrophils Relative 48 %      Lymphocytes Relative 41 %      Monocytes Relative 8 %      Eosinophils Relative 3 %      Basophils Relative 1 %      Neutrophils Absolute 3 55 Thousands/µL      Lymphocytes Absolute 3 01 Thousands/µL      Monocytes Absolute 0 62 Thousand/µL      Eosinophils Absolute 0 22 Thousand/µL      Basophils Absolute 0 04 Thousands/µL     Comprehensive metabolic panel [[id number]] Collected:  05/19/18 1147    Lab Status:   In process Specimen:  Blood from Arm, Right Updated:  05/19/18 1151                 X-ray chest 2 views    (Results Pending)              Procedures  Procedures       Phone Contacts  ED Phone Contact    ED Course  ED Course as of May 19 1333   Sat May 19, 2018   1217 ER MD  MEDICAL DECISION MAKING NOTE- PT IS LOW RISKS FOR PE BY WELL'S SCORE- SCORE OF 1 5-- FAILS PERC BY  AGE AND BASELINE TACHY WILL CHECK D DIMER    1332 CXR PA/LAT- COMPARED TO PREVIOUS FROM  1/18-- INCREASED CARDIAC SILHOUETTE-- AICD- NO OTHER SIGN CHAGNES                                [facility]  CritCare Time    Disposition  Final diagnoses:   None     ED Disposition     None      Follow-up Information None         Patient's Medications   Discharge Prescriptions    No medications on file     No discharge procedures on file      ED Provider  Electronically Signed by           Micheline Maloney MD  05/21/18 2027

## 2018-05-19 NOTE — H&P
History and Physical - [facility] Internal Medicine    Patient Information: Amilcar Wheeler 52 y o  male MRN: [id number]  Unit/Bed#: [facility] Encounter: [id number]  Admitting Physician: Jh Wells MD  PCP: Da Zambrano MD  Date of Admission:  05/19/18    Assessment/Plan:    Hospital Problem List:     Principal Problem:    Chest pain  Active Problems:    Brugada syndrome    Ischemic cardiomyopathy    Benign essential hypertension    Cardiomyopathy ([facility] [address] )    NSVT (nonsustained ventricular tachycardia) ([facility] )      Plan for the Primary Problem(s):  · Atypical chest pain  · Obtain cardiac enzymes x3  · Tele monitor  · FLP and A1c  · Continue Imdur/beta-blockers  · Cardiology consultation  · Recent cardiac catheterization January 2008 was normal    Plan for Additional Problems:   · Brugada syndrome and then SVT:  Status post ICD placement:  Continue close outpatient cardiology follow-up  He is to have EP study with Dr Ogden Prudent  · Hypertension:  Continue metoprolol and lisinopril  · Hyperlipidemia on atorvastatin 40 mg q h s  Larayne Chroman · Cardiomyopathy:  Continue with Lasix 20 mg daily  · Hypokalemia:  Replete with potassium chloride    VTE Prophylaxis: Enoxaparin (Lovenox)  / sequential compression device   Code Status:  Full code  POLST: There is no POLST form on file for this patient (pre-hospital)    Anticipated Length of Stay:  Patient will be admitted on an Observation basis with an anticipated length of stay of  < 2 midnights  Justification for Hospital Stay:  Chest pain    Total Time for Visit, including Counseling / Coordination of Care: 70 minutes  Greater than 50% of this total time spent on direct patient counseling and coordination of care  Chief Complaint:   Chest pain    History of Present Illness:    Amilcar Wheeler is a 52 y o  male who presents with chest pain and shortness of breath started 1 hour prior to arrival in the emergency department    He also felt pounding in the chest   He reports that he was praying and listening to music at the time of symptom onset  He reports feeling nauseous but not vomited  No diaphoresis  Symptoms resolved after receiving nitro under tongue and aspirin in ED  Denies any exertional chest pain or shortness of breath  No fever or chills  No cough    Review of Systems:    Review of Systems  Twelve point review systems negative except noted above  Past Medical and Surgical History:     Past Medical History:   Diagnosis Date    Bradycardia     Brugada syndrome     Cardiac disease     Hyperlipidemia     Hypertension     ICD (implantable cardioverter-defibrillator) in place     MI (myocardial infarction) ([facility] [address] )     per pt report    Pacemaker        Past Surgical History:   Procedure Laterality Date    CARDIAC CATHETERIZATION      CARDIAC DEFIBRILLATOR PLACEMENT      CARDIAC PACEMAKER PLACEMENT      LUMBAR FUSION      NO PAST SURGERIES         Meds/Allergies:    Prior to Admission medications    Medication Sig Start Date End Date Taking?  Authorizing Provider   aspirin 81 mg chewable tablet Chew 1 tablet daily for 30 days  Patient taking differently: Chew 81 mg 2 (two) times a day   4/6/17 5/19/18 Yes Susan Rodriguez MD   atorvastatin (LIPITOR) 40 mg tablet TAKE 1 TABLET DAILY 5/11/18  Yes Yogi Quintero DO   clopidogrel (PLAVIX) 75 mg tablet TAKE 1 TABLET DAILY 5/11/18  Yes Yogi Quintero DO   furosemide (LASIX) 20 mg tablet Take 20 mg by mouth daily   Yes Historical Provider, MD   isosorbide mononitrate (IMDUR) 60 mg 24 hr tablet Take 60 mg by mouth daily   Yes Historical Provider, MD   lisinopril (ZESTRIL) 5 mg tablet Take 5 mg by mouth daily   Yes Historical Provider, MD   metoprolol succinate (TOPROL-XL) 25 mg 24 hr tablet Take 1 tablet by mouth 2 (two) times a day 5/2/17  Yes Ashley Patch, DO   nitroglycerin (NITROSTAT) 0 4 mg SL tablet Place 1 tablet under the tongue every 5 (five) minutes as needed for chest pain 6/15/17  Yes Ashley Patch, DO     I have reviewed home medications with patient personally  Allergies: No Known Allergies    Social History:     Marital Status: Single   Occupation:   Patient Pre-hospital Living Situation:  Lives with wife  Patient Pre-hospital Level of Mobility:  Normal independent  Patient Pre-hospital Diet Restrictions:  None  Substance Use History:   History   Alcohol Use    2 4 oz/week    2 Cans of beer, 2 Glasses of wine per week     Comment: Social     History   Smoking Status    Former Smoker    Quit date: 1/19/2006   Smokeless Tobacco    Never Used     History   Drug Use No       Family History:    non-contributory    Physical Exam:     Vitals:   Blood Pressure: 121/64 (05/19/18 1415)  Pulse: 66 (05/19/18 1415)  Temperature: (!) 97 2 °F (36 2 °C) (05/19/18 1408)  Temp Source: Oral (05/19/18 1408)  Respirations: 18 (05/19/18 1415)  Weight - Scale: 103 kg (227 lb 1 2 oz) (05/19/18 1142)  SpO2: 97 % (05/19/18 1415)    Physical Exam     Gen -Patient comfortable at rest   Neck- Supple  No thyromegaly or lymphadenopathy  Lungs-Clear bilaterally without any wheeze or rales   Heart S1-S2, regular rate and rhythm, no murmurs  Abdomen-soft nontender, no organomegaly   Bowel sounds present  Extremities-no cyanosi,  clubbing or edema  Skin- no rash  Neuro-nonfocal     Additional Data:     Lab Results: I have personally reviewed pertinent films in PACS      Results from last 7 days  Lab Units 05/19/18  1147   WBC Thousand/uL 7 44   HEMOGLOBIN g/dL 14 6   HEMATOCRIT % 40 7   PLATELETS Thousands/uL 198   NEUTROS PCT % 48   LYMPHS PCT % 41   MONOS PCT % 8   EOS PCT % 3       Results from last 7 days  Lab Units 05/19/18  1147   SODIUM mmol/L 136   POTASSIUM mmol/L 3 4*   CHLORIDE mmol/L 101   CO2 mmol/L 24   BUN mg/dL 17   CREATININE mg/dL 0 97   CALCIUM mg/dL 9 1   TOTAL PROTEIN g/dL 7 5   BILIRUBIN TOTAL mg/dL 0 70   ALK PHOS U/L 79   ALT U/L 51   AST U/L 31   GLUCOSE RANDOM mg/dL 170*       Results from last 7 days  Lab Units 05/19/18  1147   INR  1 04       Imaging: I have personally reviewed pertinent films in PACS    X-ray Chest 2 Views    Result Date: 5/19/2018  Narrative: CHEST INDICATION:   chest pain  COMPARISON:  1/10/2018 EXAM PERFORMED/VIEWS:  XR CHEST PA & LATERAL FINDINGS:  Left-sided chest wall pacemaker is identified  Pacemaker leads are intact  Mildly prominent cardiac silhouette  Right infrahilar bronchovascular crowding versus small infiltrate  No pneumothorax or pleural effusion  Osseous structures appear within normal limits for patient age  Impression: Right infrahilar bronchovascular crowding versus small infiltrate  Follow-up as clinically warranted  Workstation performed: [id number]       EKG, Pathology, and Other Studies Reviewed on Admission:   · EKG: nst    Allscripts / Epic Records Reviewed: Yes     ** Please Note: This note has been constructed using a voice recognition system   **

## 2018-05-19 NOTE — ED PROCEDURE NOTE
PROCEDURE  ECG 12 Lead Documentation  Date/Time: 5/19/2018 2:26 PM  Performed by: Edwin Holloway  Authorized by: Harley CHISHOLM     Indications / Diagnosis:  Repeat ecg # 2   ECG reviewed by me, the ED Provider: yes    Previous ECG:     Previous ECG:  Compared to current    Comparison ECG info:  No sign change compared to initial er ecg    Similarity:  No change    Comparison to cardiac monitor: Yes    Interpretation:     Interpretation: abnormal    Rate:     ECG rate:  78    ECG rate assessment: normal    Rhythm:     Rhythm: sinus rhythm    Ectopy:     Ectopy: none    QRS:     QRS axis:  Normal    QRS intervals:   Wide  Conduction:     Conduction: abnormal      Abnormal conduction: complete RBBB    ST segments:     ST segments:  Normal  T waves:     T waves: flattening      Flattening:  I, aVL, II, III, aVF, V1, V2, V3, V4, V5 and V6  Q waves:     Q waves:  III and aVF  Comments:      No ecg signs of ischemia/ injury         Oren Edwards MD  05/19/18 7080

## 2018-05-19 NOTE — Clinical Note
Case was discussed with ANNA WEBB and the patient's admission status was agreed to be Admission Status: observation status to the service of Dr ANNA HARPER

## 2018-05-19 NOTE — PLAN OF CARE
CARDIOVASCULAR - ADULT     Maintains optimal cardiac output and hemodynamic stability Progressing     Absence of cardiac dysrhythmias or at baseline rhythm Progressing

## 2018-05-20 ENCOUNTER — APPOINTMENT (OUTPATIENT)
Dept: CT IMAGING | Facility: HOSPITAL | Age: 53
End: 2018-05-20
Payer: COMMERCIAL

## 2018-05-20 LAB
ANION GAP SERPL CALCULATED.3IONS-SCNC: 9 MMOL/L (ref 4–13)
BUN SERPL-MCNC: 15 MG/DL (ref 5–25)
CALCIUM SERPL-MCNC: 9.2 MG/DL (ref 8.3–10.1)
CHLORIDE SERPL-SCNC: 105 MMOL/L (ref 100–108)
CHOLEST SERPL-MCNC: 153 MG/DL (ref 50–200)
CO2 SERPL-SCNC: 24 MMOL/L (ref 21–32)
CREAT SERPL-MCNC: 0.91 MG/DL (ref 0.6–1.3)
GFR SERPL CREATININE-BSD FRML MDRD: 95 ML/MIN/1.73SQ M
GLUCOSE P FAST SERPL-MCNC: 97 MG/DL (ref 65–99)
GLUCOSE SERPL-MCNC: 97 MG/DL (ref 65–140)
HDLC SERPL-MCNC: 30 MG/DL (ref 40–60)
LDLC SERPL CALC-MCNC: 93 MG/DL (ref 0–100)
MAGNESIUM SERPL-MCNC: 2 MG/DL (ref 1.6–2.6)
NONHDLC SERPL-MCNC: 123 MG/DL
POTASSIUM SERPL-SCNC: 4.2 MMOL/L (ref 3.5–5.3)
SODIUM SERPL-SCNC: 138 MMOL/L (ref 136–145)
TRIGL SERPL-MCNC: 150 MG/DL

## 2018-05-20 PROCEDURE — 99244 OFF/OP CNSLTJ NEW/EST MOD 40: CPT | Performed by: INTERNAL MEDICINE

## 2018-05-20 PROCEDURE — 83735 ASSAY OF MAGNESIUM: CPT | Performed by: INTERNAL MEDICINE

## 2018-05-20 PROCEDURE — 80048 BASIC METABOLIC PNL TOTAL CA: CPT | Performed by: INTERNAL MEDICINE

## 2018-05-20 PROCEDURE — 71275 CT ANGIOGRAPHY CHEST: CPT

## 2018-05-20 PROCEDURE — 99225 PR SBSQ OBSERVATION CARE/DAY 25 MINUTES: CPT | Performed by: INTERNAL MEDICINE

## 2018-05-20 PROCEDURE — 80061 LIPID PANEL: CPT | Performed by: INTERNAL MEDICINE

## 2018-05-20 RX ADMIN — CLOPIDOGREL BISULFATE 75 MG: 75 TABLET ORAL at 08:13

## 2018-05-20 RX ADMIN — ASPIRIN 81 MG 81 MG: 81 TABLET ORAL at 17:34

## 2018-05-20 RX ADMIN — LISINOPRIL 5 MG: 5 TABLET ORAL at 08:13

## 2018-05-20 RX ADMIN — ATORVASTATIN CALCIUM 40 MG: 40 TABLET, FILM COATED ORAL at 17:30

## 2018-05-20 RX ADMIN — ASPIRIN 81 MG 81 MG: 81 TABLET ORAL at 08:13

## 2018-05-20 RX ADMIN — IOHEXOL 85 ML: 350 INJECTION, SOLUTION INTRAVENOUS at 15:34

## 2018-05-20 RX ADMIN — ENOXAPARIN SODIUM 40 MG: 40 INJECTION SUBCUTANEOUS at 08:13

## 2018-05-20 RX ADMIN — ISOSORBIDE MONONITRATE 60 MG: 60 TABLET, EXTENDED RELEASE ORAL at 08:13

## 2018-05-20 RX ADMIN — FUROSEMIDE 20 MG: 20 TABLET ORAL at 08:13

## 2018-05-20 RX ADMIN — METOPROLOL SUCCINATE 25 MG: 25 TABLET, EXTENDED RELEASE ORAL at 08:13

## 2018-05-20 RX ADMIN — METOPROLOL SUCCINATE 25 MG: 25 TABLET, EXTENDED RELEASE ORAL at 17:34

## 2018-05-20 NOTE — PROGRESS NOTES
[facility] [facility] Internal Medicine Progress Note  Patient: Jonh Whitman 52 y o  male   MRN: [id number]  PCP: Desiree Kruse MD  Unit/Bed#: [facility] Encounter: [id number]  Date Of Visit: 18    Assessment:    Principal Problem:    Chest pain  Active Problems:    Brugada syndrome    Ischemic cardiomyopathy    Benign essential hypertension    Cardiomyopathy ([address] )    NSVT (nonsustained ventricular tachycardia) ([facility])      Plan:    · Chest pain/shortness of breath:  Troponins negative  A1c 5 9  LDL 93  Chest x-ray negative  Await CTA chest per Cardiology recommendations,  D-dimers negative  2D echocardiogram pending  · 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:  Status post ICD  Close outpatient follow-up with Cardiology  · Hypertension controlled  · Hyperlipidemia on statin  · Hypokalemia resolved       VTE Pharmacologic Prophylaxis:   Pharmacologic: Enoxaparin (Lovenox)  Mechanical VTE Prophylaxis in Place: Yes    Patient Centered Rounds: I have performed bedside rounds with nursing staff today  Discussions with Specialists or Other Care Team Provider:  Nursing    Education and Discussions with Family / Patient:   Jesica Nolaner / Patient:    Time Spent for Care: 30 minutes  More than 50% of total time spent on counseling and coordination of care as described above  Current Length of Stay: 0 day(s)    Current Patient Status: Observation   Certification Statement: The patient will continue to require additional inpatient hospital stay due to Shortness of breath    Discharge Plan / Estimated Discharge Date:  Pending testing    Code Status: Level 1 - Full Code      Subjective:   No events overnight  Denies any chest pain or shortness of breath today    Objective:     Vitals:   Temp (24hrs), Av 7 °F (36 5 °C), Min:97 2 °F (36 2 °C), Max:98 2 °F (36 8 °C)    HR:  [] 64  Resp:  [18-24] 18  BP: (107-140)/(55-94) 129/85  SpO2:  [96 %-100 %] 98 %  Body mass index is 29 78 kg/m²       Input and Output Summary (last 24 hours):     No intake or output data in the 24 hours ending 05/20/18 1120    Physical Exam:     Physical Exam     Gen -Patient comfortable   Neck- Supple  No thyromegaly or lymphadenopathy  Lungs-Clear bilaterally without any wheeze or rales   Heart S1-S2, regular rate and rhythm, no murmurs  Abdomen-soft nontender, no organomegaly  Bowel sounds present  Extremities-no cyanosi,  clubbing or edema  Skin- no rash  Neuro-nonfocal       Additional Data:     Labs:      Results from last 7 days  Lab Units 05/19/18  1640 05/19/18  1147   WBC Thousand/uL  --  7 44   HEMOGLOBIN g/dL  --  14 6   HEMATOCRIT %  --  40 7   PLATELETS Thousands/uL 189 198   NEUTROS PCT %  --  48   LYMPHS PCT %  --  41   MONOS PCT %  --  8   EOS PCT %  --  3       Results from last 7 days  Lab Units 05/20/18  0522 05/19/18  1147   SODIUM mmol/L 138 136   POTASSIUM mmol/L 4 2 3 4*   CHLORIDE mmol/L 105 101   CO2 mmol/L 24 24   BUN mg/dL 15 17   CREATININE mg/dL 0 91 0 97   CALCIUM mg/dL 9 2 9 1   TOTAL PROTEIN g/dL  --  7 5   BILIRUBIN TOTAL mg/dL  --  0 70   ALK PHOS U/L  --  79   ALT U/L  --  51   AST U/L  --  31   GLUCOSE RANDOM mg/dL 97 170*       Results from last 7 days  Lab Units 05/19/18  1147   INR  1 04       * I Have Reviewed All Lab Data Listed Above  * Additional Pertinent Lab Tests Reviewed:  All Labs Within Last 24 Hours Reviewed    Imaging:    Imaging Reports Reviewed Today Include:   Imaging Personally Reviewed by Myself Includes:      Recent Cultures (last 7 days):           Last 24 Hours Medication List:     Current Facility-Administered Medications:  acetaminophen 650 mg Oral Q4H PRN Tanya Jimenez MD   aspirin 81 mg Oral BID Tanya Jimenez MD   atorvastatin 40 mg Oral Daily With Dinner Tanya Jimenez MD   clopidogrel 75 mg Oral Daily Tanya Jimenez MD   enoxaparin 40 mg Subcutaneous Daily Tanya Jimenez MD   furosemide 20 mg Oral Daily Tanya Jimenez MD   isosorbide mononitrate 60 mg Oral Daily Tanya Jimenez MD   lisinopril 5 mg Oral Daily Tanya Jimenez MD   metoprolol succinate 25 mg Oral BID Tanay Jimenez MD   nitroglycerin 0 4 mg Sublingual Q5 Min PRN Tanya Jimenez MD        Today, Patient Was Seen By: Janny Galeas MD    ** Please Note: This note has been constructed using a voice recognition system   **

## 2018-05-20 NOTE — CASE MANAGEMENT
Continued Stay Review    Date: 5/20/2018    Vital Signs: /85 (BP Location: Left arm)   Pulse 64   Temp 98 2 °F (36 8 °C) (Oral)   Resp 18   Ht 6' 1" (1 854 m)   Wt 102 kg (225 lb 12 oz)   SpO2 98%   BMI 29 78 kg/m²     Medications:   Scheduled Meds:   Current Facility-Administered Medications:  aspirin 81 mg Oral BID Tanya Jimenez MD   atorvastatin 40 mg Oral Daily With Dinner Tanya Jimenez MD   clopidogrel 75 mg Oral Daily Tanya Jimenez MD   enoxaparin 40 mg Subcutaneous Daily Tanya Jimenez MD   furosemide 20 mg Oral Daily Tanya Jimenez MD   isosorbide mononitrate 60 mg Oral Daily Tanya Jimenez MD   lisinopril 5 mg Oral Daily Tanya Jimenez MD   metoprolol succinate 25 mg Oral BID Tanya Jimenez MD     Continuous Infusions:    PRN Meds: not used  Abnormal Labs/Diagnostic Results:   Echo ordered  cta chest - pending  Age/Sex: 52 y o  male     · Assessment/Plan: Chest pain/shortness of breath:  Troponins negative  A1c 5 9  LDL 93  Chest x-ray negative  Await CTA chest per Cardiology recommendations,  D-dimers negative  2D echocardiogram pending  · 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:  Status post ICD  Close outpatient follow-up with Cardiology  · Hypertension controlled  · Hyperlipidemia on statin  Hypokalemia resolved    Discharge Plan: To be determined  Thank you,  [address] [place] [address] in the [facility] by Juan Jose Rogers for 2017  Network Utilization Review Department  Phone: [phone]; Fax [phone]  ATTENTION: The Network Utilization Review Department is now centralized for our 7 Facilities  Make a note that we have a new phone and fax numbers for our Department  Please call with any questions or concerns to [phone] and carefully follow the prompts so that you are directed to the right person   All voicemails are confidential  Fax any determinations, approvals, denials, and requests for initial or continue stay review clinical to [phone]  Due to HIGH CALL volume, it would be easier if you could please send faxed requests to expedite your requests and in part, help us provide discharge notifications faster

## 2018-05-20 NOTE — CONSULTS
Consultation - General Cardiology   Ford Carrillo 52 y o  male MRN: [id number]  Unit/Bed#: -01 Encounter: [id number]    Inpatient consult to Cardiology  Consult performed by: Brandin Aaron ordered by: [facility]        Chief Complaint   Patient presents with    Chest Pain       pt reports pain started in his chest 1 hour ago  pt also has shortness of breath  feels like a "blet" on middle of his chest  pt feels that his heart rate has gone up         Physician Requesting Consult: Rick Plunkett MD  Reason for Consult / Principal Problem: chest pain    History of Present Illness   HPI: Ford Carrillo is a 52y o  year old male who has a history of Brugada syndrome, prior ICD, CAD, HTN and HL  In the fall of 2017,he underwent  PCI of a  of the L Cx ( performed at [facility] )  Due to persistent dyspnea, he was re-cathed in January by Dr Liliana Hunter  He had a 50% m RCA lesion, felt not to be significant by FFR  His previously placed stent was widely patent  His EF is 50-55% ( TTE 5/17), improved from 40% previously  He follows with Dr Norberto Proctor  In the last year he has been increasingly dyspneic  After his circumflex artery was opened his breathing improved however has not improved completely  He is scheduled to see Dr Chinyere Cabral in the near future for an electrophysiology evaluation    He travels frequently  Recently, within the last week a returned from [facility]  Yesterday, he was doing some light activity he bent over and felt some significant substernal chest pressure with associated nausea and diaphoresis  He felt presyncopal   For these reasons he came to the hospital   He focuses on the fact that his breathing is just not the same as it was over a year ago  In the ED his blood pressure was 140/94  He was mildly tachypneic with rate of 24    Chest x-ray per the  radiologist,  A small right in for hilar infiltrate could not be ruled out, troponins have been repeatedly normal  Electrolytes are satisfactory  Cardiac cath 1/18: ( Dr Yinka Colin)  CORONARY CIRCULATION:  Left main: Normal   LAD: The vessel was normal sized  There was minor plaque  There were no obstructive lesions  Circumflex: The vessel was normal sized and gave rise to a major OM branch  The site of prior  stent placement is widely patent with no restenosis  RCA: The vessel was normal sized and dominant  There was a 50% stenosis in mid vessel  The lesion was interrogated by iFR and was not flow-significant (iFR=0 99)  FH- + Brugada  Social history: Former smoker       Review of Systems   Constitution: Negative for chills, fever, weakness and malaise/fatigue  Cardiovascular: Positive for chest pain, dyspnea on exertion and near-syncope  Negative for claudication, cyanosis, irregular heartbeat, leg swelling, orthopnea, palpitations, paroxysmal nocturnal dyspnea and syncope  Respiratory: Positive for shortness of breath  Negative for cough  Gastrointestinal: Positive for nausea  Negative for heartburn and vomiting  See history of present illness   Neurological: Negative for dizziness, focal weakness, headaches, light-headedness and loss of balance  All other systems reviewed and are negative      Historical Information   Past Medical History:   Diagnosis Date    Bradycardia     Brugada syndrome     Cardiac disease     Hyperlipidemia     Hypertension     ICD (implantable cardioverter-defibrillator) in place     MI (myocardial infarction) ([facility] [address] )     per pt report    Pacemaker      Past Surgical History:   Procedure Laterality Date    CARDIAC CATHETERIZATION      CARDIAC DEFIBRILLATOR PLACEMENT      CARDIAC PACEMAKER PLACEMENT      LUMBAR FUSION      NO PAST SURGERIES       History   Alcohol Use    2 4 oz/week    2 Cans of beer, 2 Glasses of wine per week     Comment: Social     History   Drug Use No     History   Smoking Status    Former Smoker    Quit date: 1/19/2006   Smokeless Tobacco  Never Used     Family History:   Family History   Problem Relation Age of Onset    Heart disease Mother        Meds/Allergies   all current active meds have been reviewed, current meds:   Current Facility-Administered Medications   Medication Dose Route Frequency    acetaminophen (TYLENOL) tablet 650 mg  650 mg Oral Q4H PRN    aspirin chewable tablet 81 mg  81 mg Oral BID    atorvastatin (LIPITOR) tablet 40 mg  40 mg Oral Daily With Dinner    clopidogrel (PLAVIX) tablet 75 mg  75 mg Oral Daily    enoxaparin (LOVENOX) subcutaneous injection 40 mg  40 mg Subcutaneous Daily    furosemide (LASIX) tablet 20 mg  20 mg Oral Daily    isosorbide mononitrate (IMDUR) 24 hr tablet 60 mg  60 mg Oral Daily    lisinopril (ZESTRIL) tablet 5 mg  5 mg Oral Daily    metoprolol succinate (TOPROL-XL) 24 hr tablet 25 mg  25 mg Oral BID    nitroglycerin (NITROSTAT) SL tablet 0 4 mg  0 4 mg Sublingual Q5 Min PRN    and PTA meds:   Prior to Admission Medications   Prescriptions Last Dose Informant Patient Reported?  Taking?   aspirin 81 mg chewable tablet  Self No Yes   Sig: Chew 1 tablet daily for 30 days   Patient taking differently: Chew 81 mg 2 (two) times a day     atorvastatin (LIPITOR) 40 mg tablet   No Yes   Sig: TAKE 1 TABLET DAILY   clopidogrel (PLAVIX) 75 mg tablet   No Yes   Sig: TAKE 1 TABLET DAILY   furosemide (LASIX) 20 mg tablet  Self Yes Yes   Sig: Take 20 mg by mouth daily   isosorbide mononitrate (IMDUR) 60 mg 24 hr tablet  Self Yes Yes   Sig: Take 60 mg by mouth daily   lisinopril (ZESTRIL) 5 mg tablet  Self Yes Yes   Sig: Take 5 mg by mouth daily   metoprolol succinate (TOPROL-XL) 25 mg 24 hr tablet  Self No Yes   Sig: Take 1 tablet by mouth 2 (two) times a day   nitroglycerin (NITROSTAT) 0 4 mg SL tablet  Self No Yes   Sig: Place 1 tablet under the tongue every 5 (five) minutes as needed for chest pain      Facility-Administered Medications: None          No Known Allergies    Objective   Vitals: Blood pressure 129/85, pulse 64, temperature 98 2 °F (36 8 °C), temperature source Oral, resp  rate 18, height 6' 1" (1 854 m), weight 102 kg (225 lb 12 oz), SpO2 98 %  ,     Body mass index is 29 78 kg/m²  ,     Systolic (90PWN), KHK:438 , Min:107 , RADHA:607     Diastolic (52JMU), PCY:81, Min:55, Max:94          No intake or output data in the 24 hours ending 05/20/18 0756  Weight (last 2 days)     Date/Time   Weight    05/19/18 1542  102 (225 75)    05/19/18 1142  103 (227 07)            Invasive Devices     Peripheral Intravenous Line            Peripheral IV 06/24/17 Right Arm 329 days    Peripheral IV 01/19/18 Left Wrist 120 days    Peripheral IV 05/19/18 Right Antecubital less than 1 day                  Physical Exam   Constitutional: He is oriented to person, place, and time  No distress  HENT:   Head: Normocephalic and atraumatic  Eyes: Pupils are equal, round, and reactive to light  Neck: Normal range of motion  Neck supple  Cardiovascular: Normal rate, regular rhythm, normal heart sounds and intact distal pulses  Pulmonary/Chest: Effort normal and breath sounds normal    Abdominal: Soft  Bowel sounds are normal    Neurological: He is alert and oriented to person, place, and time  Skin: Skin is warm and dry  He is not diaphoretic  Psychiatric: He has a normal mood and affect  Nursing note and vitals reviewed          LABORATORY RESULTS:    Results from last 7 days  Lab Units 05/19/18 1952 05/19/18  1640 05/19/18  1147   TROPONIN I ng/mL <0 02 <0 02 <0 02     CBC with diff:   Results from last 7 days  Lab Units 05/19/18  1640 05/19/18  1147   WBC Thousand/uL  --  7 44   HEMOGLOBIN g/dL  --  14 6   HEMATOCRIT %  --  40 7   MCV fL  --  84   PLATELETS Thousands/uL 189 198   MCH pg  --  30 2   MCHC g/dL  --  35 9   RDW %  --  12 7   MPV fL 10 3 10 2       CMP:  Results from last 7 days  Lab Units 05/20/18  0522 05/19/18  1147   SODIUM mmol/L 138 136   POTASSIUM mmol/L 4 2 3 4*   CHLORIDE mmol/L 105 101 CO2 mmol/L 24 24   ANION GAP mmol/L 9 11   BUN mg/dL 15 17   CREATININE mg/dL 0 91 0 97   GLUCOSE RANDOM mg/dL 97 170*   CALCIUM mg/dL 9 2 9 1   AST U/L  --  31   ALT U/L  --  51   ALK PHOS U/L  --  79   TOTAL PROTEIN g/dL  --  7 5   BILIRUBIN TOTAL mg/dL  --  0 70   EGFR ml/min/1 73sq m 95 88       BMP:  Results from last 7 days  Lab Units 18  0522 18  1147   SODIUM mmol/L 138 136   POTASSIUM mmol/L 4 2 3 4*   CHLORIDE mmol/L 105 101   CO2 mmol/L 24 24   BUN mg/dL 15 17   CREATININE mg/dL 0 91 0 97   GLUCOSE RANDOM mg/dL 97 170*   CALCIUM mg/dL 9 2 9 1          Lab Results   Component Value Date    NTBNP 129 (H) 2017    NTBNP 99 2017            Results from last 7 days  Lab Units 18  0522   MAGNESIUM mg/dL 2 0          Results from last 7 days  Lab Units 18  1640   HEMOGLOBIN A1C % 5 9                Results from last 7 days  Lab Units 18  1147   INR  1 04     Lipid Profile:   Lab Results   Component Value Date    CHOL 153 2018    CHOL 113 2018    CHOL 107 06/15/2017     Lab Results   Component Value Date    HDL 30 (L) 2018    HDL 30 (L) 2018    HDL 33 (L) 06/15/2017     Lab Results   Component Value Date    LDLCALC 93 2018    LDLCALC 52 2018    LDLCALC 61 06/15/2017     Lab Results   Component Value Date    TRIG 150 2018    TRIG 157 (H) 2018    TRIG 63 06/15/2017         Cardiac testing:   Results for orders placed during the hospital encounter of 17   Echo complete with contrast if indicated    Narrative [address]  [address] [facility] [phone]    Transthoracic Echocardiogram  2D, M-mode, Doppler, and Color Doppler    Study date:  26-May-2017    Patient: Marsha Reyna  MR number: [id number]  Account number: [de-identified]  : 1965  Age: 45 years  Gender: Male  Status: Routine  Location: Echo lab  Height: 73 in  Weight: 214 5 lb  BP: 156/ 98 mmHg    Indications: CAD    Diagnoses: 272 4 - HYPERLIPIDEMIA NEC/NOS, 414 00 - COR ATH UNSP VSL NTV/GFT, 425 4 - PRIM CARDIOMYOPATHY NEC, 746 89 - PEDRO HEART ANOMALY NEC    Sonographer:  TODD Guido  Primary Physician:  Ivanna Zamarripa MD  Referring Physician:  Kehinde Blanco DO  Group:  [facility]  Interpreting Physician:  Azra Gonzales MD    SUMMARY    LEFT VENTRICLE:  Systolic function was normal  Ejection fraction was estimated in the range of 50 % to 55 %  There were no regional wall motion abnormalities  There was mild hypokinesis of the basal-mid inferolateral wall(s)  Doppler parameters were consistent with abnormal left ventricular relaxation (grade 1 diastolic dysfunction)  VENTRICULAR SEPTUM:  There was "bounce" motion  LEFT ATRIUM:  The atrium was mildly dilated  ATRIAL SEPTUM:  The septum bows from right to left, consistent with increased right atrial pressure  There was redundancy of the septum, with borderline criteria for aneurysm  HISTORY: PRIOR HISTORY: Implantable cardioverter defibrillator, Brugada syndrome    PROCEDURE: The procedure was performed in the echo lab  This was a routine study  The transthoracic approach was used  The study included complete 2D imaging, M-mode, complete spectral Doppler, and color Doppler  The heart rate was 64 bpm,  at the start of the study  Images were obtained from the parasternal, apical, subcostal, and suprasternal notch acoustic windows  Echocardiographic views were limited due to poor acoustic window availability, decreased penetration, and  lung interference  This was a technically difficult study  LEFT VENTRICLE: Size was normal  Systolic function was normal  Ejection fraction was estimated in the range of 50 % to 55 %  There were no regional wall motion abnormalities  There was mild hypokinesis of the basal-mid inferolateral  wall(s)  Wall thickness was normal  No evidence of apical thrombus   DOPPLER: Doppler parameters were consistent with abnormal left ventricular relaxation (grade 1 diastolic dysfunction)  VENTRICULAR SEPTUM: There was "bounce" motion  RIGHT VENTRICLE: The size was normal  Systolic function was normal  Wall thickness was normal     LEFT ATRIUM: The atrium was mildly dilated  ATRIAL SEPTUM: The septum bows from right to left, consistent with increased right atrial pressure  There was redundancy of the septum, with borderline criteria for aneurysm  RIGHT ATRIUM: The atrium was mildly dilated  MITRAL VALVE: Valve structure was normal  There was normal leaflet separation  DOPPLER: The transmitral velocity was within the normal range  There was no evidence for stenosis  There was no significant regurgitation  AORTIC VALVE: The valve was trileaflet  Leaflets exhibited normal thickness and normal cuspal separation  DOPPLER: Transaortic velocity was within the normal range  There was no evidence for stenosis  There was no significant  regurgitation  TRICUSPID VALVE: The valve structure was normal  There was normal leaflet separation  DOPPLER: The transtricuspid velocity was within the normal range  There was no evidence for stenosis  There was no significant regurgitation  The  tricuspid jet envelope definition was inadequate for estimation of RV systolic pressure  There are no indirect findings (abnormal RV volume or geometry, altered pulmonary flow velocity profile, or leftward septal displacement) which would  suggest moderate or severe pulmonary hypertension  PULMONIC VALVE: Leaflets exhibited normal thickness, no calcification, and normal cuspal separation  DOPPLER: The transpulmonic velocity was within the normal range  There was mild regurgitation  PERICARDIUM: There was no pericardial effusion  The pericardium was normal in appearance  AORTA: The root exhibited normal size  SYSTEMIC VEINS: IVC: The inferior vena cava was normal in size      SYSTEM MEASUREMENT TABLES    2D mode  AoR Diam 2D: 3 4 cm  LA Diam (2D): 3 8 cm  LA/Ao (2D): 1 12  FS (2D Teich): 26 9 %  IVSd (2D): 0 97 cm  LVDEV: 159 cm³  LVEDV MOD BP: 144 cm³  LVESV: 76 4 cm³  LVIDd(2D): 5 68 cm  LVISd (2D): 4 15 cm  LVOT Area 2D: 3 14 cm squared  LVPWd (2D): 0 86 cm  SV (Teich): 82 6 cm³    Apical four chamber  LVEF A4C: 55 %    Apical two chamber  LA Area: 18 8 cm squared  LA Volume: 53 cm³  LVEF A2C: 50 %    Unspecified Scan Mode  STACIE Cont Eq (Peak Stepan): 2 88 cm squared  LVOT Diam : 2 cm  LVOT Vmax: 1130 mm/s  LVOT Vmax; Mean: 1130 mm/s  Peak Grad ; Mean: 5 mm[Hg]  MV Peak A Stepan: 617 mm/s  MV Peak E Stepan  Mean: 405 mm/s  MVA (PHT): 3 1 cm squared  PHT: 71 ms  Max P mm[Hg]  V Max: 2290 mm/s  Vmax: 2040 mm/s  RA Area: 25 5 cm squared  RA Volume: 81 8 cm³  TAPSE: 2 5 cm    [facility] Accredited Echocardiography Laboratory    Prepared and electronically signed by    David Tatum MD  Signed  16:54:25         No procedure found    Results for orders placed during the hospital encounter of 18   NM myocardial perfusion spect (stress and/or rest)    [facility] [address]  [address] [place] [address]  [phone]    Rest/Stress Gated SPECT Myocardial Perfusion Imaging After Regadenoson    Patient: Conner Stafford  MR number: [id number]  Account number: [de-identified]  : 1965  Age: 45 years  Gender: Male  Status: Outpatient  Location: [address] [place] [address] [facility]  Height: 73 in  Weight: 221 lb  BP: 120/ 70 mmHg    Allergies: NO KNOWN ALLERGIES    Diagnosis: I25 10 - Atherosclerotic heart disease of native coronary artery without angina pectoris    Interpreting Physician:  Lani Mortimer, DO  Referring Physician:  Jermaine Kendrick DO  Primary Physician:  Chilo Babcock MD  Technician:  DAMARIS Abraham  RN:  Felix White RN  Group:  [facility]  Cardiology Fellow:  Brock De La Garza MD  Report Prepared by[de-identified]  Felix White RN    INDICATIONS: Evaluation of known coronary artery disease  HISTORY: The patient is a 45year old  male  Chest pain status: no chest pain  Other symptoms: dyspnea  Coronary artery disease risk factors: dyslipidemia, hypertension, former smoking, and family history of premature coronary  artery disease  Cardiovascular history: coronary artery disease, cardiomyopathy, Brugada syndrome Prior cardiovascular procedures: percutaneous transluminal coronary angioplasty s/p stent to circumflex , chronic totally occluded 1st obtuse  marginal, implantable cardiac defibrillator Medications: a beta blocker, an ACE inhibitor, a diuretic, aspirin, clopidogrel, and a lipid lowering agent  Previous test results: abnormal ECG (RBBB)    PHYSICAL EXAM: Baseline physical exam screening: normal     REST ECG: Normal sinus rhythm  The ECG showed right bundle branch block  PROCEDURE: The study was performed at the [place] [address] [facility]  A regadenoson infusion pharmacologic stress test was performed  Gated SPECT myocardial perfusion imaging was performed after stress  Systolic blood pressure was  120 mmHg, at the start of the study  Diastolic blood pressure was 70 mmHg, at the start of the study  The heart rate was 67 bpm, at the start of the study  IV double checked  Regadenoson protocol:  HR bpm SBP mmHg DBP mmHg Symptoms  Baseline 67 120 70 none  Immediate 105 128 74 mild dyspnea  1 min 70 122 76 subsiding  No medications or fluids given  The patient also performed low level exercise  STRESS SUMMARY: Duration of pharmacologic stress was 3 min  Maximal heart rate during stress was 105 bpm  The rate-pressure product for the peak heart rate and blood pressure was 05718  There was no chest pain during stress  The stress  test was terminated due to protocol completion  Pre oxygen saturation: 96 %  Peak oxygen saturation: 96 %  The stress ECG was non-diagnostic for ischemia   Arrhythmia during stress: isolated premature ventricular beats     ISOTOPE ADMINISTRATION:  Resting isotope administration Stress isotope administration  Agent Tetrofosmin Tetrofosmin  Dose 10 36 mCi 30 4 mCi  Date 01/11/2018 01/11/2018  Injection time 12:05 13:45  Imaging time 13:10 14:19  Injection-image interval 65 min 34 min    The radiopharmaceutical was injected at the peak effect of pharmacologic stress  MYOCARDIAL PERFUSION IMAGING:  The image quality was poor  Rotating projection images reveal mild anterior chest wall soft tissue attenuation and moderate subdiaphragmatic activity  Left ventricular size was normal  The left ventricle did not visually dilate transiently  during stress  The TID ratio was 0 68  PERFUSION DEFECTS:  -  There was a small, mildly severe, reversible myocardial perfusion defect of the basal to mid anterior wall possibly due to chest wall soft tissue attenuation artifact  -  There was a small, mildly severe, reversible myocardial perfusion defect of the basal lateral and anterolateral walls  GATED SPECT:  The calculated left ventricular ejection fraction was 66 %  Left ventricular ejection fraction was within normal limits by visual estimate  There was no left ventricular regional abnormality  SUMMARY:  -  Stress results: There was no chest pain during stress  -  ECG conclusions: The stress ECG was non-diagnostic for ischemia  -  Perfusion imaging: The image quality was poor  The left ventricle did not visually dilate transiently during stress  There was a small, mildly severe, reversible myocardial perfusion defect of the basal to mid anterior wall possibly due  to chest wall soft tissue attenuation artifact  There was a small, mildly severe, reversible myocardial perfusion defect of the basal lateral and anterolateral walls  -  Gated SPECT: The calculated left ventricular ejection fraction was 66 %  Left ventricular ejection fraction was within normal limits by visual estimate   There was no left ventricular regional abnormality  IMPRESSIONS: Abnormal study after pharmacologic vasodilation without reproduction of symptoms although imaging quality was suboptimal  There was a small amount of ischemia in the distribution of the left circumflex coronary artery  There  was a small reversible defect in the basal to mid anterior wall which may be secondary to attenuation artifact although a small amount of LAD territory ischemia cannot be excluded  Left ventricular systolic function was normal     Prepared and signed by    Karen Zarate DO  Signed 01/11/2018 16:18:32           Imaging: I have personally reviewed pertinent reports  and I have personally reviewed pertinent films in PACS  X-ray Chest 2 Views  Result Date: 5/19/2018  Narrative: CHEST INDICATION:   chest pain  COMPARISON:  1/10/2018 EXAM PERFORMED/VIEWS:  XR CHEST PA & LATERAL FINDINGS:  Left-sided chest wall pacemaker is identified  Pacemaker leads are intact  Mildly prominent cardiac silhouette  Right infrahilar bronchovascular crowding versus small infiltrate  No pneumothorax or pleural effusion  Osseous structures appear within normal limits for patient age  Impression: Right infrahilar bronchovascular crowding versus small infiltrate  Follow-up as clinically warranted  Workstation performed: [id number]       EKG reviewed personally:  Sinus rhythm  Right bundle branch block, similar prior        Telemetry reviewed personally:  Sinus rhythm, no ectopy    Assessment  Principal Problem:    Chest pain  Active Problems:    Brugada syndrome    Ischemic cardiomyopathy    Benign essential hypertension    Cardiomyopathy ([facility] [address] )    NSVT (nonsustained ventricular tachycardia) ([facility])      Assessment  GRIMES  Brugada syndrome, asymptomatic  · S/P MDT single chamber ICD-placed 2001 Hx CM with an improved EF to 50-55%  CAD, PCI/stent L Cx Jan 2018  · On ASA 81, clopidigrel 75 mg/d,statin, imdur 60 mg/d, metoprolol succinate 25 mg po BID  HTN   /73    On loop diuretic, lisinopril 5 mg/d, nitrate, BB  HL -on atorvastatin 40 mg/d    Plan  Interrogate ICD  CTA of the chest  PFts-can be done as OP  echocardiogram      Thank you for allowing us to participate in this patient's care  This pt will follow up with Dr Norberto Proctor once discharged  Counseling / Coordination of Care  Total floor / unit time spent today 45 minutes  Greater than 50% of total time was spent with the patient and / or family counseling and / or coordination of care  A description of the counseling / coordination of care: Review of history, current assessment, development of a plan  Code Status: Level 1 - Full Code    ** Please Note: Dragon 360 Dictation voice to text software may have been used in the creation of this document   **

## 2018-05-20 NOTE — CASE MANAGEMENT
Initial Clinical Review    Admission: Date/Time/Statement: 5/19/2018  1335 OBSERVATION    Orders Placed This Encounter   Procedures    Place in Observation (expected length of stay for this patient is less than two midnights)     Standing Status:   Standing     Number of Occurrences:   1     Order Specific Question:   Admitting Physician     Answer:   Melissa Covington     Order Specific Question:   Level of Care     Answer:   Med Surg [16]         ED: Date/Time/Mode of Arrival:   ED Arrival Information     Expected Arrival Acuity Means of Arrival Escorted By Service Admission Type    - 5/19/2018 11:27 Emergent Walk-In Self General Medicine Emergency    Arrival Complaint    chest pain          Chief Complaint:   Chief Complaint   Patient presents with    Chest Pain     pt reports pain started in his chest 1 hour ago  pt also has shortness of breath  feels like a "blet" on middle of his chest  pt feels that his heart rate has gone up       History of Illness: 52 y o  male who presents with chest pain and shortness of breath started 1 hour prior to arrival in the emergency department  He also felt pounding in the chest   He reports that he was praying and listening to music at the time of symptom onset  He reports feeling nauseous but not vomited  No diaphoresis  Symptoms resolved after receiving nitro under tongue and aspirin in ED  ED Vital Signs:   ED Triage Vitals   Temperature Pulse Respirations Blood Pressure SpO2   05/19/18 1408 05/19/18 1142 05/19/18 1142 05/19/18 1142 05/19/18 1142   (!) 97 2 °F (36 2 °C) (!) 110 (!) 24 140/94 100 %      Temp Source Heart Rate Source Patient Position - Orthostatic VS BP Location FiO2 (%)   05/19/18 1142 05/19/18 1142 05/19/18 1542 05/19/18 1142 --   Oral Monitor Lying Right arm       Pain Score       05/19/18 1142       8        Wt Readings from Last 1 Encounters:   05/19/18 102 kg (225 lb 12 oz)       Vital Signs (abnormal): Low temperature 97 2    Maximum pulse 110, respiratory rate 24  Abnormal Labs/Diagnostic Test Results:   K 3 4 slightly hemolyzed  Glucose 170  CxR- Right infrahilar bronchovascular crowding versus small infiltrate    ekg- sinus  Complete RBBB  T wave flattening  Q waves III and aVF    Serial troponin negative  5/20/2018-  K 4 2  ED Treatment:   Medication Administration from 05/19/2018 1127 to 05/19/2018 1541       Date/Time Order Dose Route Action Action by Comments     05/19/2018 1205 nitroglycerin (NITRO-BID) 2 % TD ointment 1 inch 1 inch Topical [facility]           Past Medical/Surgical History: Active Ambulatory Problems     Diagnosis Date Noted    Dyspnea on exertion 04/03/2017    Dizziness 04/03/2017    Brugada syndrome 04/03/2017    Ischemic cardiomyopathy 04/06/2017    Coronary atherosclerosis 04/06/2017    Chest pain 05/01/2017    [facility]     ICD (implantable cardioverter-defibrillator) in place     Hypertension     Benign essential hypertension 09/11/2017    Cardiomyopathy [facility] 05/18/2017    Hyperlipidemia 05/18/2017    NSVT (nonsustained ventricular tachycardia) ([facility] ) 01/22/2018    Pacemaker 01/01/2001    HFrEF (heart failure with reduced ejection fraction) ([facility] ) 04/11/2017     Resolved Ambulatory Problems     Diagnosis Date Noted    No Resolved Ambulatory Problems     Past Medical History:   Diagnosis Date    Bradycardia     Brugada syndrome     Cardiac disease     Hyperlipidemia     Hypertension     ICD (implantable cardioverter-defibrillator) in place     MI [facility]myocardial infarction) [facility]     Pacemaker        Admitting Diagnosis: Chest pain [R07 9]  Brugada syndrome [I49 8]  Ischemic cardiomyopathy [I25 5]  Chest pain, unspecified type [R07 9]    Age/Sex: 52 y o  male    · Assessment/Plan: Atypical chest pain  ? Obtain cardiac enzymes x3  ? Tele monitor  ? FLP and A1c  ? Continue Imdur/beta-blockers  ? Cardiology consultation  ?  Recent cardiac catheterization January 2008 was normal     Plan for Additional Problems:   · Brugada syndrome and then SVT:  Status post ICD placement:  Continue close outpatient cardiology follow-up  He is to have EP study with Dr Danisha Box  · Hypertension:  Continue metoprolol and lisinopril  · Hyperlipidemia on atorvastatin 40 mg q h pedrito [place] · Cardiomyopathy:  Continue with Lasix 20 mg daily  · Hypokalemia:  Replete with potassium chloride       Admission Orders:  5/19/2018  1335 OBSERVATION  Scheduled Meds:   Current Facility-Administered Medications:  aspirin 81 mg Oral BID Tanya Jimenez MD   atorvastatin 40 mg Oral Daily With Dinner Tanya Jimenez MD   clopidogrel 75 mg Oral Daily Tanya Jimenez MD   enoxaparin 40 mg Subcutaneous Daily Tanya Jimenez MD   furosemide 20 mg Oral Daily Tanya Jimenez MD   isosorbide mononitrate 60 mg Oral Daily Tanya Jimenez MD   lisinopril 5 mg Oral Daily Tanya Jimenez MD   metoprolol succinate 25 mg Oral BID Tanya Jimenez MD     Continuous Infusions:    PRN Meds: not used      icd/pacemaker interrogation  Telemetry  scds  Consult cardiology    Per cardiology - GRIMES  Brugada syndrome, asymptomatic  · S/P MDT single chamber ICD-placed 2001  Hx CM with an improved EF to 50-55%  CAD, PCI/stent L Cx Jan 2018  · On ASA 81, clopidigrel 75 mg/d,statin, imdur 60 mg/d, metoprolol succinate 25 mg po BID  HTN   /73  On loop diuretic, lisinopril 5 mg/d, nitrate, BB  HL -on atorvastatin 40 mg/d     Plan  Interrogate ICD  CTA of the chest  PFts-can be done as OP  echocardiogram      Thank you,  [facility] in the [employer] by Juan Jose Rogers for 2017  Network Utilization Review Department  Phone: [phone]; Fax [phone]  ATTENTION: The Network Utilization Review Department is now centralized for our 7 Facilities  Make a note that we have a new phone and fax numbers for our Department   Please call with any questions or concerns to [phone] and carefully follow the prompts so that you are directed to the right person  All voicemails are confidential  Fax any determinations, approvals, denials, and requests for initial or continue stay review clinical to [phone]  Due to HIGH CALL volume, it would be easier if you could please send faxed requests to expedite your requests and in part, help us provide discharge notifications faster

## 2018-05-21 VITALS
WEIGHT: 225.75 LBS | BODY MASS INDEX: 29.92 KG/M2 | HEART RATE: 62 BPM | HEIGHT: 73 IN | RESPIRATION RATE: 18 BRPM | TEMPERATURE: 98.8 F | SYSTOLIC BLOOD PRESSURE: 121 MMHG | OXYGEN SATURATION: 98 % | DIASTOLIC BLOOD PRESSURE: 85 MMHG

## 2018-05-21 LAB
ATRIAL RATE: 78 BPM
ATRIAL RATE: 93 BPM
P AXIS: 45 DEGREES
P AXIS: 68 DEGREES
PR INTERVAL: 154 MS
PR INTERVAL: 162 MS
QRS AXIS: 12 DEGREES
QRS AXIS: 20 DEGREES
QRSD INTERVAL: 160 MS
QRSD INTERVAL: 164 MS
QT INTERVAL: 392 MS
QT INTERVAL: 394 MS
QTC INTERVAL: 446 MS
QTC INTERVAL: 489 MS
T WAVE AXIS: 127 DEGREES
T WAVE AXIS: 71 DEGREES
VENTRICULAR RATE: 78 BPM
VENTRICULAR RATE: 93 BPM

## 2018-05-21 PROCEDURE — 99214 OFFICE O/P EST MOD 30 MIN: CPT | Performed by: INTERNAL MEDICINE

## 2018-05-21 PROCEDURE — 93010 ELECTROCARDIOGRAM REPORT: CPT | Performed by: INTERNAL MEDICINE

## 2018-05-21 PROCEDURE — 99217 PR OBSERVATION CARE DISCHARGE MANAGEMENT: CPT | Performed by: INTERNAL MEDICINE

## 2018-05-21 RX ADMIN — FUROSEMIDE 20 MG: 20 TABLET ORAL at 08:53

## 2018-05-21 RX ADMIN — METOPROLOL SUCCINATE 25 MG: 25 TABLET, EXTENDED RELEASE ORAL at 08:54

## 2018-05-21 RX ADMIN — ASPIRIN 81 MG 81 MG: 81 TABLET ORAL at 08:53

## 2018-05-21 RX ADMIN — LISINOPRIL 5 MG: 5 TABLET ORAL at 08:54

## 2018-05-21 RX ADMIN — CLOPIDOGREL BISULFATE 75 MG: 75 TABLET ORAL at 08:53

## 2018-05-21 RX ADMIN — ENOXAPARIN SODIUM 40 MG: 40 INJECTION SUBCUTANEOUS at 08:54

## 2018-05-21 RX ADMIN — ISOSORBIDE MONONITRATE 60 MG: 60 TABLET, EXTENDED RELEASE ORAL at 08:53

## 2018-05-21 NOTE — DISCHARGE SUMMARY
[facility]    Patient Information: Donovan Jeffrey 52 y o  male MRN: [id number]  Unit/Bed#: -01 Encounter: [id number]    Discharging Physician / Practitioner: Farida Underwood MD  PCP: Cailin Vasquez MD  Admission Date: 5/19/2018  Discharge Date: 05/21/18    Disposition:     Home     Reason for Admission: sob/chest pain    Discharge Diagnoses:     Principal Problem:    Chest pain  Active Problems:    Brugada syndrome    Ischemic cardiomyopathy    Benign essential hypertension    [facility]    NSVT (nonsustained ventricular tachycardia) ([facility] )  Resolved Problems:    * No resolved hospital problems  *      Consultations During Hospital Stay:  · cardiology    Procedures Performed:     · CTA: no PE  · 2D ECHO:     Complications:  none    Hospital Course:     Donovan Jeffrey is a 52 y o  male patient who originally presented to the hospital on 5/19/2018 due to chest pain/sob 1 hour prior to arrival in the emergency department  He denied any exertional chest pain  He was subsequently admitted and was placed on telemetry without any incidents  He was seen by Cardiology and recommended 2D echocardiogram and CT chest PE study  His troponins were negative  He remained symptom-free while inpatient  He has schedule appointment coming up with cardiologist this week  He was requested to have outpatient pulmonary function testing  Condition at Discharge: good     Discharge Day Visit / Exam:     * Please refer to separate progress note for these details *    Discussion with Family: yes      Discharge instructions/Information to patient and family:   See after visit summary for information provided to patient and family  Provisions for Follow-Up Care:  See after visit summary for information related to follow-up care and any pertinent home health orders  Planned Readmission: yes-wife    Discharge Statement:  I spent 35 minutes discharging the patient   This time was spent on the day of discharge  I had direct contact with the patient on the day of discharge  Greater than 50% of the total time was spent examining patient, answering all patient questions, arranging and discussing plan of care with patient as well as directly providing post-discharge instructions  Additional time then spent on discharge activities  Discharge Medications:  See after visit summary for reconciled discharge medications provided to patient and family  ** Please Note: This note has been constructed using a voice recognition system   **

## 2018-05-21 NOTE — PROGRESS NOTES
Cardiology Progress Note - Filipe Stephen 52 y o  male MRN: [id number]    Unit/Bed#: -01 Encounter: [id number]        Subjective:    No significant events overnight  No chest pain or shortness of breath  ROS    Objective:   Vitals: Blood pressure 114/62, pulse 64, temperature 98 7 °F (37 1 °C), temperature source Oral, resp  rate 18, height 6' 1" (1 854 m), weight 102 kg (225 lb 12 oz), SpO2 97 %  , Body mass index is 29 78 kg/m² , Orthostatic Blood Pressures      Most Recent Value   Blood Pressure  114/62 filed at 05/20/2018 2318   Patient Position - Orthostatic VS  Lying filed at 05/20/2018 6172         Systolic (86BNM), AOC:420 , Min:114 , XRN:158     Diastolic (89PZZ), HXF:41, Min:62, Max:75      Intake/Output Summary (Last 24 hours) at 05/21/18 0746  Last data filed at 05/20/18 1855   Gross per 24 hour   Intake              600 ml   Output                0 ml   Net              600 ml     Weight (last 2 days)     Date/Time   Weight    05/19/18 1542  102 (225 75)    05/19/18 1142  103 (227 07)                Telemetry Review: No significant arrhythmias seen on telemetry review  NSR      Physical Exam   Constitutional: He is oriented to person, place, and time  No distress  HENT:   Mouth/Throat: No oropharyngeal exudate  Eyes: No scleral icterus  Neck: No JVD present  Cardiovascular: Normal rate and regular rhythm  No murmur heard  Pulmonary/Chest: Effort normal and breath sounds normal  No respiratory distress  He has no wheezes  He has no rales  Abdominal: Soft  Bowel sounds are normal  He exhibits no distension  There is no tenderness  There is no rebound  Musculoskeletal: He exhibits no edema  Neurological: He is alert and oriented to person, place, and time  Skin: Skin is warm and dry  He is not diaphoretic  Psychiatric: He has a normal mood and affect   His behavior is normal          Laboratory Results:    Results from last 7 days  Lab Units 05/19/18 1952 05/19/18  1640 05/19/18  1147   TROPONIN I ng/mL <0 02 <0 02 <0 02       CBC with diff:   Results from last 7 days  Lab Units 05/19/18  1640 05/19/18  1147   WBC Thousand/uL  --  7 44   HEMOGLOBIN g/dL  --  14 6   HEMATOCRIT %  --  40 7   MCV fL  --  84   PLATELETS Thousands/uL 189 198   MCH pg  --  30 2   MCHC g/dL  --  35 9   RDW %  --  12 7   MPV fL 10 3 10 2         CMP:  Results from last 7 days  Lab Units 05/20/18  0522 05/19/18  1147   SODIUM mmol/L 138 136   POTASSIUM mmol/L 4 2 3 4*   CHLORIDE mmol/L 105 101   CO2 mmol/L 24 24   ANION GAP mmol/L 9 11   BUN mg/dL 15 17   CREATININE mg/dL 0 91 0 97   GLUCOSE RANDOM mg/dL 97 170*   CALCIUM mg/dL 9 2 9 1   AST U/L  --  31   ALT U/L  --  51   ALK PHOS U/L  --  79   TOTAL PROTEIN g/dL  --  7 5   BILIRUBIN TOTAL mg/dL  --  0 70   EGFR ml/min/1 73sq m 95 88         BMP:  Results from last 7 days  Lab Units 05/20/18  0522 05/19/18  1147   SODIUM mmol/L 138 136   POTASSIUM mmol/L 4 2 3 4*   CHLORIDE mmol/L 105 101   CO2 mmol/L 24 24   BUN mg/dL 15 17   CREATININE mg/dL 0 91 0 97   GLUCOSE RANDOM mg/dL 97 170*   CALCIUM mg/dL 9 2 9 1       BNP: No results for input(s): BNP in the last 72 hours      Magnesium:   Results from last 7 days  Lab Units 05/20/18  0522   MAGNESIUM mg/dL 2 0       Coags:   Results from last 7 days  Lab Units 05/19/18  1147   PTT seconds 27   INR  1 04       TSH: No results found for: TSH    Hemoglobin A1C   Results from last 7 days  Lab Units 05/19/18  1640   HEMOGLOBIN A1C % 5 9       Lipid Profile:   Results from last 7 days  Lab Units 05/20/18  0522   CHOLESTEROL mg/dL 153   TRIGLYCERIDES mg/dL 150   HDL mg/dL 30*       Cardiac testing:   Results for orders placed during the hospital encounter of 05/26/17   Echo complete with contrast if indicated    Lorna [address]  [address] [facility], [place] [address]  [phone]    Transthoracic Echocardiogram  2D, M-mode, Doppler, and Color Doppler    Study date:  26-May-2017    Patient: MARIANNA JOHNSON  MR number: [id number]  Account number: [de-identified]  : 1965  Age: 45 years  Gender: Male  Status: Routine  Location: Echo lab  Height: 73 in  Weight: 214 5 lb  BP: 156/ 98 mmHg    Indications: CAD    Diagnoses: 272 4 - HYPERLIPIDEMIA NEC/NOS, 414 00 - COR ATH UNSP VSL NTV/GFT, 425 4 - PRIM CARDIOMYOPATHY NEC, 746 89 - PEDRO HEART ANOMALY NEC    Sonographer:  TODD Cortes  Primary Physician:  Ulises Zamarripa MD  Referring Physician:  Justa Colon DO  Group:  [facility]  Interpreting Physician:  Valentina Corea MD    SUMMARY    LEFT VENTRICLE:  Systolic function was normal  Ejection fraction was estimated in the range of 50 % to 55 %  There were no regional wall motion abnormalities  There was mild hypokinesis of the basal-mid inferolateral wall(s)  Doppler parameters were consistent with abnormal left ventricular relaxation (grade 1 diastolic dysfunction)  VENTRICULAR SEPTUM:  There was "bounce" motion  LEFT ATRIUM:  The atrium was mildly dilated  ATRIAL SEPTUM:  The septum bows from right to left, consistent with increased right atrial pressure  There was redundancy of the septum, with borderline criteria for aneurysm  HISTORY: PRIOR HISTORY: Implantable cardioverter defibrillator, Brugada syndrome    PROCEDURE: The procedure was performed in the echo lab  This was a routine study  The transthoracic approach was used  The study included complete 2D imaging, M-mode, complete spectral Doppler, and color Doppler  The heart rate was 64 bpm,  at the start of the study  Images were obtained from the parasternal, apical, subcostal, and suprasternal notch acoustic windows  Echocardiographic views were limited due to poor acoustic window availability, decreased penetration, and  lung interference  This was a technically difficult study  LEFT VENTRICLE: Size was normal  Systolic function was normal  Ejection fraction was estimated in the range of 50 % to 55 %   There were no regional wall motion abnormalities  There was mild hypokinesis of the basal-mid inferolateral  wall(s)  Wall thickness was normal  No evidence of apical thrombus  DOPPLER: Doppler parameters were consistent with abnormal left ventricular relaxation (grade 1 diastolic dysfunction)  VENTRICULAR SEPTUM: There was "bounce" motion  RIGHT VENTRICLE: The size was normal  Systolic function was normal  Wall thickness was normal     LEFT ATRIUM: The atrium was mildly dilated  ATRIAL SEPTUM: The septum bows from right to left, consistent with increased right atrial pressure  There was redundancy of the septum, with borderline criteria for aneurysm  RIGHT ATRIUM: The atrium was mildly dilated  MITRAL VALVE: Valve structure was normal  There was normal leaflet separation  DOPPLER: The transmitral velocity was within the normal range  There was no evidence for stenosis  There was no significant regurgitation  AORTIC VALVE: The valve was trileaflet  Leaflets exhibited normal thickness and normal cuspal separation  DOPPLER: Transaortic velocity was within the normal range  There was no evidence for stenosis  There was no significant  regurgitation  TRICUSPID VALVE: The valve structure was normal  There was normal leaflet separation  DOPPLER: The transtricuspid velocity was within the normal range  There was no evidence for stenosis  There was no significant regurgitation  The  tricuspid jet envelope definition was inadequate for estimation of RV systolic pressure  There are no indirect findings (abnormal RV volume or geometry, altered pulmonary flow velocity profile, or leftward septal displacement) which would  suggest moderate or severe pulmonary hypertension  PULMONIC VALVE: Leaflets exhibited normal thickness, no calcification, and normal cuspal separation  DOPPLER: The transpulmonic velocity was within the normal range  There was mild regurgitation      PERICARDIUM: There was no pericardial effusion  The pericardium was normal in appearance  AORTA: The root exhibited normal size  SYSTEMIC VEINS: IVC: The inferior vena cava was normal in size  SYSTEM MEASUREMENT TABLES    2D mode  AoR Diam 2D: 3 4 cm  LA Diam (2D): 3 8 cm  LA/Ao (2D): 1 12  FS (2D Teich): 26 9 %  IVSd (2D): 0 97 cm  LVDEV: 159 cm³  LVEDV MOD BP: 144 cm³  LVESV: 76 4 cm³  LVIDd(2D): 5 68 cm  LVISd (2D): 4 15 cm  LVOT Area 2D: 3 14 cm squared  LVPWd (2D): 0 86 cm  SV (Teich): 82 6 cm³    Apical four chamber  LVEF A4C: 55 %    Apical two chamber  LA Area: 18 8 cm squared  LA Volume: 53 cm³  LVEF A2C: 50 %    Unspecified Scan Mode  STACIE Cont Eq (Peak Stepan): 2 88 cm squared  LVOT Diam : 2 cm  LVOT Vmax: 1130 mm/s  LVOT Vmax; Mean: 1130 mm/s  Peak Grad ; Mean: 5 mm[Hg]  MV Peak A Stepan: 617 mm/s  MV Peak E Stepan   Mean: 405 mm/s  MVA (PHT): 3 1 cm squared  PHT: 71 ms  Max P mm[Hg]  V Max: 2290 mm/s  Vmax: 2040 mm/s  RA Area: 25 5 cm squared  RA Volume: 81 8 cm³  TAPSE: 2 5 cm    [facility] Accredited Echocardiography Laboratory    Prepared and electronically signed by    Patria Rodriguez MD  Signed [id number] 16:54:25           Results for orders placed during the hospital encounter of 18   NM myocardial perfusion spect (stress and/or rest)    Narrative [address]  [phone]    Rest/Stress Gated SPECT Myocardial Perfusion Imaging After Regadenoson    Patient: Analisa Garsia  MR number: [id number]  Account number: [de-identified]  : 1965  Age: 45 years  Gender: Male  Status: Outpatient  Location: [address] [place] [address] [facility]  Height: 73 in  Weight: 221 lb  BP: 120/ 70 mmHg    Allergies: NO KNOWN ALLERGIES    Diagnosis: I25 10 - Atherosclerotic heart disease of native coronary artery without angina pectoris    Interpreting Physician:  Marichuy Sanchez DO  Referring Physician:  Rosalinda Angel DO  Primary Physician:  Da Zambrano MD  Technician:  DAMARIS Dumont  RN:  Crystal Marie RN  Group:  [facility]  Cardiology Fellow:  Phil Kumar MD  Report Prepared by[de-identified]  Crystal Marie RN    INDICATIONS: Evaluation of known coronary artery disease  HISTORY: The patient is a 45year old  male  Chest pain status: no chest pain  Other symptoms: dyspnea  Coronary artery disease risk factors: dyslipidemia, hypertension, former smoking, and family history of premature coronary  artery disease  Cardiovascular history: coronary artery disease, cardiomyopathy, Brugada syndrome Prior cardiovascular procedures: percutaneous transluminal coronary angioplasty s/p stent to circumflex , chronic totally occluded 1st obtuse  marginal, implantable cardiac defibrillator Medications: a beta blocker, an ACE inhibitor, a diuretic, aspirin, clopidogrel, and a lipid lowering agent  Previous test results: abnormal ECG (RBBB)    PHYSICAL EXAM: Baseline physical exam screening: normal     REST ECG: Normal sinus rhythm  The ECG showed right bundle branch block  PROCEDURE: The study was performed at the [address] [facility]  A regadenoson infusion pharmacologic stress test was performed  Gated SPECT myocardial perfusion imaging was performed after stress  Systolic blood pressure was  120 mmHg, at the start of the study  Diastolic blood pressure was 70 mmHg, at the start of the study  The heart rate was 67 bpm, at the start of the study  IV double checked  Regadenoson protocol:  HR bpm SBP mmHg DBP mmHg Symptoms  Baseline 67 120 70 none  Immediate 105 128 74 mild dyspnea  1 min 70 122 76 subsiding  No medications or fluids given  The patient also performed low level exercise  STRESS SUMMARY: Duration of pharmacologic stress was 3 min  Maximal heart rate during stress was 105 bpm  The rate-pressure product for the peak heart rate and blood pressure was 86822  There was no chest pain during stress   The stress  test was terminated due to protocol completion  Pre oxygen saturation: 96 %  Peak oxygen saturation: 96 %  The stress ECG was non-diagnostic for ischemia  Arrhythmia during stress: isolated premature ventricular beats  ISOTOPE ADMINISTRATION:  Resting isotope administration Stress isotope administration  Agent Tetrofosmin Tetrofosmin  Dose 10 36 mCi 30 4 mCi  Date 01/11/2018 01/11/2018  Injection time 12:05 13:45  Imaging time 13:10 14:19  Injection-image interval 65 min 34 min    The radiopharmaceutical was injected at the peak effect of pharmacologic stress  MYOCARDIAL PERFUSION IMAGING:  The image quality was poor  Rotating projection images reveal mild anterior chest wall soft tissue attenuation and moderate subdiaphragmatic activity  Left ventricular size was normal  The left ventricle did not visually dilate transiently  during stress  The TID ratio was 0 68  PERFUSION DEFECTS:  -  There was a small, mildly severe, reversible myocardial perfusion defect of the basal to mid anterior wall possibly due to chest wall soft tissue attenuation artifact  -  There was a small, mildly severe, reversible myocardial perfusion defect of the basal lateral and anterolateral walls  GATED SPECT:  The calculated left ventricular ejection fraction was 66 %  Left ventricular ejection fraction was within normal limits by visual estimate  There was no left ventricular regional abnormality  SUMMARY:  -  Stress results: There was no chest pain during stress  -  ECG conclusions: The stress ECG was non-diagnostic for ischemia  -  Perfusion imaging: The image quality was poor  The left ventricle did not visually dilate transiently during stress  There was a small, mildly severe, reversible myocardial perfusion defect of the basal to mid anterior wall possibly due  to chest wall soft tissue attenuation artifact   There was a small, mildly severe, reversible myocardial perfusion defect of the basal lateral and anterolateral walls   -  Gated SPECT: The calculated left ventricular ejection fraction was 66 %  Left ventricular ejection fraction was within normal limits by visual estimate  There was no left ventricular regional abnormality  IMPRESSIONS: Abnormal study after pharmacologic vasodilation without reproduction of symptoms although imaging quality was suboptimal  There was a small amount of ischemia in the distribution of the left circumflex coronary artery  There  was a small reversible defect in the basal to mid anterior wall which may be secondary to attenuation artifact although a small amount of LAD territory ischemia cannot be excluded   Left ventricular systolic function was normal     Prepared and signed by    Luis Angel López DO  Signed 01/11/2018 16:18:32         Meds/Allergies   current meds:   Current Facility-Administered Medications   Medication Dose Route Frequency    acetaminophen (TYLENOL) tablet 650 mg  650 mg Oral Q4H PRN    aspirin chewable tablet 81 mg  81 mg Oral BID    atorvastatin (LIPITOR) tablet 40 mg  40 mg Oral Daily With Dinner    clopidogrel (PLAVIX) tablet 75 mg  75 mg Oral Daily    enoxaparin (LOVENOX) subcutaneous injection 40 mg  40 mg Subcutaneous Daily    furosemide (LASIX) tablet 20 mg  20 mg Oral Daily    iohexol (OMNIPAQUE) 350 MG/ML injection (MULTI-DOSE) 85 mL  85 mL Intravenous Once in imaging    isosorbide mononitrate (IMDUR) 24 hr tablet 60 mg  60 mg Oral Daily    lisinopril (ZESTRIL) tablet 5 mg  5 mg Oral Daily    metoprolol succinate (TOPROL-XL) 24 hr tablet 25 mg  25 mg Oral BID    nitroglycerin (NITROSTAT) SL tablet 0 4 mg  0 4 mg Sublingual Q5 Min PRN     Prescriptions Prior to Admission   Medication    aspirin 81 mg chewable tablet    atorvastatin (LIPITOR) 40 mg tablet    clopidogrel (PLAVIX) 75 mg tablet    furosemide (LASIX) 20 mg tablet    isosorbide mononitrate (IMDUR) 60 mg 24 hr tablet    lisinopril (ZESTRIL) 5 mg tablet    metoprolol succinate (TOPROL-XL) 25 mg 24 hr tablet    nitroglycerin (NITROSTAT) 0 4 mg SL tablet          Assessment:  Principal Problem:    Chest pain  Active Problems:    Brugada syndrome    Ischemic cardiomyopathy    Benign essential hypertension    Cardiomyopathy (HCC)    NSVT (nonsustained ventricular tachycardia) ([facility])      Plan:    Cardiomyopathy/ICD/Coronary Artery Disease with history of PCI of an OM/Suspected Brugada    Echo is pending  Continue with remainder of medical therapy  If CT and echo are unremarkable then he can go home  Counseling / Coordination of Care  Total floor / unit time spent today 25 minutes  Greater than 50% of total time was spent with the patient and / or family counseling and / or coordination of care  A description of the counseling / coordination of care

## 2018-05-21 NOTE — CASE MANAGEMENT
Continued Stay Review    Date: 5/21/18    Vital Signs: /88 (BP Location: Left arm)   Pulse 72   Temp 98 °F (36 7 °C) (Oral)   Resp 20   Ht 6' 1" (1 854 m)   Wt 102 kg (225 lb 12 oz)   SpO2 96% on Room Air BMI 29 78 kg/m²     Medications:   Scheduled Meds:   Current Facility-Administered Medications:  acetaminophen 650 mg Oral Q4H PRN   aspirin 81 mg Oral BID   atorvastatin 40 mg Oral Daily With Dinner   clopidogrel 75 mg Oral Daily   enoxaparin 40 mg Subcutaneous Daily   furosemide 20 mg Oral Daily   iohexol 85 mL Intravenous Once in imaging   isosorbide mononitrate 60 mg Oral Daily   lisinopril 5 mg Oral Daily   metoprolol succinate 25 mg Oral BID   nitroglycerin 0 4 mg Sublingual Q5 Min PRN       Abnormal Labs/Diagnostic Results:     HDL = 30    CT chest: No large central pulmonary embolus, no focal consolidation  ECHO results pending 5/21     Age/Sex: 52 y o  male     Assessment/Plan:     Discharge Plan: TBD    ==============================================================  5/21 Cardiology Progress Note:      Assessment:  Principal Problem:    Chest pain  Active Problems:    Brugada syndrome    Ischemic cardiomyopathy    Benign essential hypertension    Cardiomyopathy ([address] )    NSVT (nonsustained ventricular tachycardia) ([facility])     Plan:     Cardiomyopathy/ICD/Coronary Artery Disease with history of PCI of an OM/Suspected Brugada     Echo is pending  Continue with remainder of medical therapy                   Thank you,  [address] [place] [address] in the [facility] by Juan Jose Rogers for 2017  Network Utilization Review Department  Phone: [phone]; Fax [phone]  ATTENTION: The Network Utilization Review Department is now centralized for our 7 Facilities  Make a note that we have a new phone and fax numbers for our Department   Please call with any questions or concerns to [phone] and carefully follow the prompts so that you are directed to the right person  All voicemails are confidential  Fax any determinations, approvals, denials, and requests for initial or continue stay review clinical to [phone]  Due to HIGH CALL volume, it would be easier if you could please send faxed requests to expedite your requests and in part, help us provide discharge notifications faster

## 2018-05-25 ENCOUNTER — OFFICE VISIT (OUTPATIENT)
Dept: CARDIOLOGY CLINIC | Facility: CLINIC | Age: 53
End: 2018-05-25
Payer: COMMERCIAL

## 2018-05-25 VITALS
HEART RATE: 61 BPM | DIASTOLIC BLOOD PRESSURE: 60 MMHG | WEIGHT: 224.9 LBS | BODY MASS INDEX: 29.81 KG/M2 | SYSTOLIC BLOOD PRESSURE: 112 MMHG | HEIGHT: 73 IN

## 2018-05-25 DIAGNOSIS — I10 ESSENTIAL HYPERTENSION: ICD-10-CM

## 2018-05-25 DIAGNOSIS — I47.2 NSVT (NONSUSTAINED VENTRICULAR TACHYCARDIA) (HCC): Primary | ICD-10-CM

## 2018-05-25 DIAGNOSIS — I10 BENIGN ESSENTIAL HYPERTENSION: ICD-10-CM

## 2018-05-25 DIAGNOSIS — I49.8 BRUGADA SYNDROME: ICD-10-CM

## 2018-05-25 DIAGNOSIS — I25.5 ISCHEMIC CARDIOMYOPATHY: ICD-10-CM

## 2018-05-25 DIAGNOSIS — Z95.810 ICD (IMPLANTABLE CARDIOVERTER-DEFIBRILLATOR) IN PLACE: ICD-10-CM

## 2018-05-25 DIAGNOSIS — R06.02 SOB (SHORTNESS OF BREATH) ON EXERTION: ICD-10-CM

## 2018-05-25 PROCEDURE — 1111F DSCHRG MED/CURRENT MED MERGE: CPT | Performed by: INTERNAL MEDICINE

## 2018-05-25 PROCEDURE — 93000 ELECTROCARDIOGRAM COMPLETE: CPT | Performed by: INTERNAL MEDICINE

## 2018-05-25 PROCEDURE — 99214 OFFICE O/P EST MOD 30 MIN: CPT | Performed by: INTERNAL MEDICINE

## 2018-05-25 NOTE — PROGRESS NOTES
EPS Progress Note - Dallas Saavedra 52 y o  male MRN: [id number]           ASSESSMENT:  1  NSVT (nonsustained ventricular tachycardia) ([facility])  POCT ECG   2  Essential hypertension     3  Ischemic cardiomyopathy     4  Brugada syndrome     5  Benign essential hypertension     6  ICD (implantable cardioverter-defibrillator) in place             PLAN:  1) needs echo to assess LVEF  2)  Genetic testing negative for brugada syndrome  3)  ? Need for PFTs  4)  CAD on asa and plavix   5) possible environmental sensitivity reassurance given he does not have got a syndrome by EKG criteria for genetic testing in terms of why of defibrillator was placed in [place] this remains unclear to me    HPI:   Interim history went to [facility] after visit to [place] because could not breathe  Also recently something in  home caused him to not be able to breathe  Sudden shortness of breath associated with chest discomfort which makes him very upset and panicked at something imminent lead that will have 12 point review of systems positive for chest pain that is sudden onset rhythm problems fainting blackouts all other 12 point ROS negative        ROS:  Has paroxysms where he feels like he can't breath  Objective:     Vitals: Height 6' 1" (1 854 m), weight 102 kg (224 lb 14 4 oz)  , Body mass index is 29 67 kg/m²  ,        Physical Exam:    GEN: Dallas Saavedra appears well, alert and oriented x 3, pleasant and cooperative   HEENT: pupils equal, round, and reactive to light; extraocular muscles intact  NECK: supple, no carotid bruits   HEART: regular rhythm, normal S1 and S2, no murmurs, clicks, gallops or rubs   LUNGS: clear to auscultation bilaterally; no wheezes, rales, or rhonchi   ABDOMEN: normal bowel sounds, soft, no tenderness, no distention  EXTREMITIES: peripheral pulses normal; no clubbing, cyanosis, or edema  NEURO: no focal findings   SKIN: normal without suspicious lesions on exposed skin    Medications: Current Outpatient Prescriptions:     aspirin 81 mg chewable tablet, Chew 1 tablet daily for 30 days (Patient taking differently: Chew 81 mg 2 (two) times a day  ), Disp: 30 tablet, Rfl: 0    atorvastatin (LIPITOR) 40 mg tablet, TAKE 1 TABLET DAILY, Disp: 90 tablet, Rfl: 3    clopidogrel (PLAVIX) 75 mg tablet, TAKE 1 TABLET DAILY, Disp: 90 tablet, Rfl: 3    furosemide (LASIX) 20 mg tablet, Take 20 mg by mouth daily, Disp: , Rfl:     isosorbide mononitrate (IMDUR) 60 mg 24 hr tablet, Take 60 mg by mouth daily, Disp: , Rfl:     lisinopril (ZESTRIL) 5 mg tablet, Take 5 mg by mouth daily, Disp: , Rfl:     metoprolol succinate (TOPROL-XL) 25 mg 24 hr tablet, Take 1 tablet by mouth 2 (two) times a day, Disp: 60 tablet, Rfl: 0    nitroglycerin (NITROSTAT) 0 4 mg SL tablet, Place 1 tablet under the tongue every 5 (five) minutes as needed for chest pain, Disp: 90 tablet, Rfl: 0     Family History:   Family History   Problem Relation Age of Onset    Heart disease Mother        Labs & Results:  Below is the patient's most recent value for Albumin, ALT, AST, BUN, Calcium, Chloride, Cholesterol, CO2, Creatinine, GFR, Glucose, HDL, Hematocrit, Hemoglobin, Hemoglobin A1C, LDL, Magnesium, Phosphorus, Platelets, Potassium, PSA, Sodium, Triglycerides, and WBC  Lab Results   Component Value Date    ALT 51 05/19/2018    AST 31 05/19/2018    BUN 15 05/20/2018    CALCIUM 9 2 05/20/2018     05/20/2018    CHOL 153 05/20/2018    CO2 24 05/20/2018    CREATININE 0 91 05/20/2018    HDL 30 (L) 05/20/2018    HCT 40 7 05/19/2018    HGB 14 6 05/19/2018    HGBA1C 5 9 05/19/2018    MG 2 0 05/20/2018     05/19/2018    K 4 2 05/20/2018     05/20/2018    TRIG 150 05/20/2018    WBC 7 44 05/19/2018     Note: for a comprehensive list of the patient's lab results, access the Results Review activity           Cardiac testing:   Results for orders placed during the hospital encounter of 05/26/17   Echo complete with contrast if indicated    Narrative [address]  [address] [facility], [place] [address]  [phone]    Transthoracic Echocardiogram  2D, M-mode, Doppler, and Color Doppler    Study date:  26-May-2017    Patient: Griffin Nettles  MR number: [id number]  Account number: [de-identified]  : 1965  Age: 45 years  Gender: Male  Status: Routine  Location: Echo lab  Height: 73 in  Weight: 214 5 lb  BP: 156/ 98 mmHg    Indications: CAD    Diagnoses: 272 4 - HYPERLIPIDEMIA NEC/NOS, 414 00 - COR ATH UNSP VSL NTV/GFT, 425 4 - PRIM CARDIOMYOPATHY NEC, 746 89 - PEDRO HEART ANOMALY NEC    Sonographer:  TODD Haider  Primary Physician:  Jeanne Zamarripa MD  Referring Physician:  Riana Escobar DO  Group:  [facility]  Interpreting Physician:  Ashely Dee MD    SUMMARY    LEFT VENTRICLE:  Systolic function was normal  Ejection fraction was estimated in the range of 50 % to 55 %  There were no regional wall motion abnormalities  There was mild hypokinesis of the basal-mid inferolateral wall(s)  Doppler parameters were consistent with abnormal left ventricular relaxation (grade 1 diastolic dysfunction)  VENTRICULAR SEPTUM:  There was "bounce" motion  LEFT ATRIUM:  The atrium was mildly dilated  ATRIAL SEPTUM:  The septum bows from right to left, consistent with increased right atrial pressure  There was redundancy of the septum, with borderline criteria for aneurysm  HISTORY: PRIOR HISTORY: Implantable cardioverter defibrillator, Brugada syndrome    PROCEDURE: The procedure was performed in the echo lab  This was a routine study  The transthoracic approach was used  The study included complete 2D imaging, M-mode, complete spectral Doppler, and color Doppler  The heart rate was 64 bpm,  at the start of the study  Images were obtained from the parasternal, apical, subcostal, and suprasternal notch acoustic windows   Echocardiographic views were limited due to poor acoustic window availability, decreased penetration, and  lung interference  This was a technically difficult study  LEFT VENTRICLE: Size was normal  Systolic function was normal  Ejection fraction was estimated in the range of 50 % to 55 %  There were no regional wall motion abnormalities  There was mild hypokinesis of the basal-mid inferolateral  wall(s)  Wall thickness was normal  No evidence of apical thrombus  DOPPLER: Doppler parameters were consistent with abnormal left ventricular relaxation (grade 1 diastolic dysfunction)  VENTRICULAR SEPTUM: There was "bounce" motion  RIGHT VENTRICLE: The size was normal  Systolic function was normal  Wall thickness was normal     LEFT ATRIUM: The atrium was mildly dilated  ATRIAL SEPTUM: The septum bows from right to left, consistent with increased right atrial pressure  There was redundancy of the septum, with borderline criteria for aneurysm  RIGHT ATRIUM: The atrium was mildly dilated  MITRAL VALVE: Valve structure was normal  There was normal leaflet separation  DOPPLER: The transmitral velocity was within the normal range  There was no evidence for stenosis  There was no significant regurgitation  AORTIC VALVE: The valve was trileaflet  Leaflets exhibited normal thickness and normal cuspal separation  DOPPLER: Transaortic velocity was within the normal range  There was no evidence for stenosis  There was no significant  regurgitation  TRICUSPID VALVE: The valve structure was normal  There was normal leaflet separation  DOPPLER: The transtricuspid velocity was within the normal range  There was no evidence for stenosis  There was no significant regurgitation  The  tricuspid jet envelope definition was inadequate for estimation of RV systolic pressure   There are no indirect findings (abnormal RV volume or geometry, altered pulmonary flow velocity profile, or leftward septal displacement) which would  suggest moderate or severe pulmonary hypertension  PULMONIC VALVE: Leaflets exhibited normal thickness, no calcification, and normal cuspal separation  DOPPLER: The transpulmonic velocity was within the normal range  There was mild regurgitation  PERICARDIUM: There was no pericardial effusion  The pericardium was normal in appearance  AORTA: The root exhibited normal size  SYSTEMIC VEINS: IVC: The inferior vena cava was normal in size  SYSTEM MEASUREMENT TABLES    2D mode  AoR Diam 2D: 3 4 cm  LA Diam (2D): 3 8 cm  LA/Ao (2D): 1 12  FS (2D Teich): 26 9 %  IVSd (2D): 0 97 cm  LVDEV: 159 cm³  LVEDV MOD BP: 144 cm³  LVESV: 76 4 cm³  LVIDd(2D): 5 68 cm  LVISd (2D): 4 15 cm  LVOT Area 2D: 3 14 cm squared  LVPWd (2D): 0 86 cm  SV (Teich): 82 6 cm³    Apical four chamber  LVEF A4C: 55 %    Apical two chamber  LA Area: 18 8 cm squared  LA Volume: 53 cm³  LVEF A2C: 50 %    Unspecified Scan Mode  STACIE Cont Eq (Peak Stepan): 2 88 cm squared  LVOT Diam : 2 cm  LVOT Vmax: 1130 mm/s  LVOT Vmax; Mean: 1130 mm/s  Peak Grad ; Mean: 5 mm[Hg]  MV Peak A Stepan: 617 mm/s  MV Peak E Stepan   Mean: 405 mm/s  MVA (PHT): 3 1 cm squared  PHT: 71 ms  Max P mm[Hg]  V Max: 2290 mm/s  Vmax: 2040 mm/s  RA Area: 25 5 cm squared  RA Volume: 81 8 cm³  TAPSE: 2 5 cm    [facility] Accredited [facility]ch[facility] Laboratory    Prepared and electronically signed by    Katharina Fontanez MD  Signed [id number] 16:54:25           Results for orders placed during the hospital encounter of 18   NM myocardial perfusion spect (stress and/or rest)    N[facility]ative [facility]  [facility], [address] [facility]  [phone]    Rest/Stress Gated SPECT Myocardial Perfusion Imaging After Regadenoson    Patient: Fe Duncan  MR number: [id number]  Account number: [de-identified]  : 1965  Age: 45 years  Gender: Male  Status: Outpatient  Location: [address] [place] [address] [facility]  Height: 73 in  Weight: 221 lb  BP: 120/ 70 mmHg    Allergies: NO KNOWN ALLERGIES    Diagnosis: I25 10 - Atherosclerotic heart disease of native coronary artery without angina pectoris    Interpreting Physician:  Teresa Lau DO  Referring Physician:  Zach Lee DO  Primary Physician:  Janna Rene MD  Technician:  DAMARIS Bueno  RN:  Samantha Callahan RN  Group:  [facility]  Cardiology Fellow:  Kassi Arevalo MD  Report Prepared by[de-identified]  Samantha Callahan RN    INDICATIONS: Evaluation of known coronary artery disease  HISTORY: The patient is a 45year old  male  Chest pain status: no chest pain  Other symptoms: dyspnea  Coronary artery disease risk factors: dyslipidemia, hypertension, former smoking, and family history of premature coronary  artery disease  Cardiovascular history: coronary artery disease, cardiomyopathy, Brugada syndrome Prior cardiovascular procedures: percutaneous transluminal coronary angioplasty s/p stent to circumflex , chronic totally occluded 1st obtuse  marginal, implantable cardiac defibrillator Medications: a beta blocker, an ACE inhibitor, a diuretic, aspirin, clopidogrel, and a lipid lowering agent  Previous test results: abnormal ECG (RBBB)    PHYSICAL EXAM: Baseline physical exam screening: normal     REST ECG: Normal sinus rhythm  The ECG showed right bundle branch block  PROCEDURE: The study was performed at the [address] [facility]  A regadenoson infusion pharmacologic stress test was performed  Gated SPECT myocardial perfusion imaging was performed after stress  Systolic blood pressure was  120 mmHg, at the start of the study  Diastolic blood pressure was 70 mmHg, at the start of the study  The heart rate was 67 bpm, at the start of the study  IV double checked  Regadenoson protocol:  HR bpm SBP mmHg DBP mmHg Symptoms  Baseline 67 120 70 none  Immediate 105 128 74 mild dyspnea  1 min 70 122 76 subsiding  No medications or fluids given  The patient also performed low level exercise      STRESS SUMMARY: Duration of pharmacologic stress was 3 min  Maximal heart rate during stress was 105 bpm  The rate-pressure product for the peak heart rate and blood pressure was 87174  There was no chest pain during stress  The stress  test was terminated due to protocol completion  Pre oxygen saturation: 96 %  Peak oxygen saturation: 96 %  The stress ECG was non-diagnostic for ischemia  Arrhythmia during stress: isolated premature ventricular beats  ISOTOPE ADMINISTRATION:  Resting isotope administration Stress isotope administration  Agent Tetrofosmin Tetrofosmin  Dose 10 36 mCi 30 4 mCi  Date 01/11/2018 01/11/2018  Injection time 12:05 13:45  Imaging time 13:10 14:19  Injection-image interval 65 min 34 min    The radiopharmaceutical was injected at the peak effect of pharmacologic stress  MYOCARDIAL PERFUSION IMAGING:  The image quality was poor  Rotating projection images reveal mild anterior chest wall soft tissue attenuation and moderate subdiaphragmatic activity  Left ventricular size was normal  The left ventricle did not visually dilate transiently  during stress  The TID ratio was 0 68  PERFUSION DEFECTS:  -  There was a small, mildly severe, reversible myocardial perfusion defect of the basal to mid anterior wall possibly due to chest wall soft tissue attenuation artifact  -  There was a small, mildly severe, reversible myocardial perfusion defect of the basal lateral and anterolateral walls  GATED SPECT:  The calculated left ventricular ejection fraction was 66 %  Left ventricular ejection fraction was within normal limits by visual estimate  There was no left ventricular regional abnormality  SUMMARY:  -  Stress results: There was no chest pain during stress  -  ECG conclusions: The stress ECG was non-diagnostic for ischemia  -  Perfusion imaging: The image quality was poor  The left ventricle did not visually dilate transiently during stress   There was a small, mildly severe, reversible myocardial perfusion defect of the basal to mid anterior wall possibly due  to chest wall soft tissue attenuation artifact  There was a small, mildly severe, reversible myocardial perfusion defect of the basal lateral and anterolateral walls  -  Gated SPECT: The calculated left ventricular ejection fraction was 66 %  Left ventricular ejection fraction was within normal limits by visual estimate  There was no left ventricular regional abnormality  IMPRESSIONS: Abnormal study after pharmacologic vasodilation without reproduction of symptoms although imaging quality was suboptimal  There was a small amount of ischemia in the distribution of the left circumflex coronary artery  There  was a small reversible defect in the basal to mid anterior wall which may be secondary to attenuation artifact although a small amount of LAD territory ischemia cannot be excluded  Left ventricular systolic function was normal     Prepared and signed by    Ana Hayes DO  Signed 01/11/2018 16:18:32           EKG personally reviewed by Yessica Degroot DO    Sinus rhythm with right bundle-branch block otherwise     Counseling / Coordination of Care  Personally discussed his situation with primary cardiologist today reviewed results of his labs work including genetic testing which was negative with the patient and his wife discussed per to sing a pulse oximeter so that he could better track his pulse ox when he has the sudden episodes of shortness of breath

## 2018-06-13 ENCOUNTER — HOSPITAL ENCOUNTER (OUTPATIENT)
Dept: PULMONOLOGY | Facility: HOSPITAL | Age: 53
Discharge: HOME | End: 2018-06-13
Attending: INTERNAL MEDICINE
Payer: COMMERCIAL

## 2018-06-13 DIAGNOSIS — R06.02 SOB (SHORTNESS OF BREATH) ON EXERTION: ICD-10-CM

## 2018-06-13 PROCEDURE — 94726 PLETHYSMOGRAPHY LUNG VOLUMES: CPT

## 2018-06-13 PROCEDURE — 94070 EVALUATION OF WHEEZING: CPT

## 2018-06-13 PROCEDURE — 94070 EVALUATION OF WHEEZING: CPT | Performed by: INTERNAL MEDICINE

## 2018-06-13 PROCEDURE — 94729 DIFFUSING CAPACITY: CPT | Performed by: INTERNAL MEDICINE

## 2018-06-13 PROCEDURE — 94760 N-INVAS EAR/PLS OXIMETRY 1: CPT

## 2018-06-13 PROCEDURE — 94729 DIFFUSING CAPACITY: CPT

## 2018-06-13 PROCEDURE — 94726 PLETHYSMOGRAPHY LUNG VOLUMES: CPT | Performed by: INTERNAL MEDICINE

## 2018-06-13 RX ORDER — LEVALBUTEROL INHALATION SOLUTION 0.63 MG/3ML
0.63 SOLUTION RESPIRATORY (INHALATION) EVERY 8 HOURS PRN
Status: DISCONTINUED | OUTPATIENT
Start: 2018-06-13 | End: 2018-06-17 | Stop reason: HOSPADM

## 2018-06-13 RX ADMIN — LEVALBUTEROL INHALATION SOLUTION 0.63 MG: 0.63 SOLUTION RESPIRATORY (INHALATION) at 11:05

## 2018-06-13 RX ADMIN — METHACHOLINE CHLORIDE 5 BREATH: 100 POWDER, FOR SOLUTION RESPIRATORY (INHALATION) at 10:30

## 2018-06-19 DIAGNOSIS — I25.10 CORONARY ARTERY DISEASE INVOLVING NATIVE HEART WITHOUT ANGINA PECTORIS, UNSPECIFIED VESSEL OR LESION TYPE: ICD-10-CM

## 2018-06-19 RX ORDER — ATORVASTATIN CALCIUM 40 MG/1
40 TABLET, FILM COATED ORAL DAILY
Qty: 90 TABLET | Refills: 0 | Status: SHIPPED | OUTPATIENT
Start: 2018-06-19 | End: 2018-06-25 | Stop reason: SDUPTHER

## 2018-06-19 RX ORDER — ISOSORBIDE MONONITRATE 60 MG/1
60 TABLET, EXTENDED RELEASE ORAL DAILY
Qty: 90 TABLET | Refills: 3 | Status: SHIPPED | OUTPATIENT
Start: 2018-06-19 | End: 2018-06-25 | Stop reason: SDUPTHER

## 2018-06-19 RX ORDER — FUROSEMIDE 20 MG/1
20 TABLET ORAL DAILY
Qty: 90 TABLET | Refills: 3 | Status: SHIPPED | OUTPATIENT
Start: 2018-06-19 | End: 2018-06-25 | Stop reason: SDUPTHER

## 2018-06-22 DIAGNOSIS — R60.9 EDEMA, UNSPECIFIED TYPE: ICD-10-CM

## 2018-06-22 DIAGNOSIS — I25.10 CORONARY ARTERY DISEASE INVOLVING NATIVE CORONARY ARTERY OF NATIVE HEART WITHOUT ANGINA PECTORIS: Primary | ICD-10-CM

## 2018-06-22 DIAGNOSIS — I25.10 CORONARY ARTERY DISEASE INVOLVING NATIVE HEART WITHOUT ANGINA PECTORIS, UNSPECIFIED VESSEL OR LESION TYPE: ICD-10-CM

## 2018-06-22 DIAGNOSIS — I10 HYPERTENSION, UNSPECIFIED TYPE: ICD-10-CM

## 2018-06-22 DIAGNOSIS — I20.8 ANGINA AT REST (HCC): ICD-10-CM

## 2018-06-25 RX ORDER — ASPIRIN 81 MG/1
81 TABLET, CHEWABLE ORAL 2 TIMES DAILY
Qty: 90 TABLET | Refills: 3 | Status: SHIPPED | OUTPATIENT
Start: 2018-06-25 | End: 2019-03-21 | Stop reason: SDUPTHER

## 2018-06-25 RX ORDER — CLOPIDOGREL BISULFATE 75 MG/1
75 TABLET ORAL DAILY
Qty: 90 TABLET | Refills: 3 | Status: SHIPPED | OUTPATIENT
Start: 2018-06-25 | End: 2019-07-24 | Stop reason: SDUPTHER

## 2018-06-25 RX ORDER — ATORVASTATIN CALCIUM 40 MG/1
40 TABLET, FILM COATED ORAL DAILY
Qty: 90 TABLET | Refills: 3 | Status: SHIPPED | OUTPATIENT
Start: 2018-06-25 | End: 2019-04-25

## 2018-06-25 RX ORDER — FUROSEMIDE 20 MG/1
20 TABLET ORAL DAILY
Qty: 90 TABLET | Refills: 3 | Status: SHIPPED | OUTPATIENT
Start: 2018-06-25 | End: 2019-07-24 | Stop reason: SDUPTHER

## 2018-06-25 RX ORDER — METOPROLOL SUCCINATE 25 MG/1
25 TABLET, EXTENDED RELEASE ORAL 2 TIMES DAILY
Qty: 90 TABLET | Refills: 3 | Status: SHIPPED | OUTPATIENT
Start: 2018-06-25 | End: 2019-04-17 | Stop reason: SDUPTHER

## 2018-06-25 RX ORDER — NITROGLYCERIN 0.4 MG/1
0.4 TABLET SUBLINGUAL
Qty: 30 TABLET | Refills: 0 | Status: SHIPPED | OUTPATIENT
Start: 2018-06-25

## 2018-06-25 RX ORDER — ISOSORBIDE MONONITRATE 60 MG/1
60 TABLET, EXTENDED RELEASE ORAL DAILY
Qty: 90 TABLET | Refills: 3 | Status: SHIPPED | OUTPATIENT
Start: 2018-06-25 | End: 2019-09-23 | Stop reason: SDUPTHER

## 2018-06-25 RX ORDER — LISINOPRIL 5 MG/1
5 TABLET ORAL DAILY
Qty: 90 TABLET | Refills: 3 | Status: SHIPPED | OUTPATIENT
Start: 2018-06-25 | End: 2019-07-24 | Stop reason: SDUPTHER

## 2018-07-06 ENCOUNTER — HOSPITAL ENCOUNTER (OUTPATIENT)
Dept: NON INVASIVE DIAGNOSTICS | Facility: CLINIC | Age: 53
Discharge: HOME | End: 2018-07-06
Payer: COMMERCIAL

## 2018-07-06 DIAGNOSIS — I10 ESSENTIAL HYPERTENSION: ICD-10-CM

## 2018-07-06 DIAGNOSIS — Z95.810 ICD (IMPLANTABLE CARDIOVERTER-DEFIBRILLATOR) IN PLACE: ICD-10-CM

## 2018-07-06 DIAGNOSIS — I47.2 NSVT (NONSUSTAINED VENTRICULAR TACHYCARDIA) (HCC): ICD-10-CM

## 2018-07-06 PROCEDURE — 93306 TTE W/DOPPLER COMPLETE: CPT

## 2018-07-06 PROCEDURE — 93306 TTE W/DOPPLER COMPLETE: CPT | Performed by: INTERNAL MEDICINE

## 2018-08-30 ENCOUNTER — OFFICE VISIT (OUTPATIENT)
Dept: PULMONOLOGY | Facility: CLINIC | Age: 53
End: 2018-08-30
Payer: COMMERCIAL

## 2018-08-30 VITALS
HEART RATE: 66 BPM | HEIGHT: 73 IN | BODY MASS INDEX: 28.52 KG/M2 | SYSTOLIC BLOOD PRESSURE: 110 MMHG | TEMPERATURE: 97.5 F | OXYGEN SATURATION: 96 % | DIASTOLIC BLOOD PRESSURE: 76 MMHG | WEIGHT: 215.2 LBS

## 2018-08-30 DIAGNOSIS — R06.02 SOB (SHORTNESS OF BREATH) ON EXERTION: ICD-10-CM

## 2018-08-30 DIAGNOSIS — J45.20 MILD INTERMITTENT ASTHMA WITHOUT COMPLICATION: Primary | ICD-10-CM

## 2018-08-30 PROCEDURE — 99214 OFFICE O/P EST MOD 30 MIN: CPT | Performed by: INTERNAL MEDICINE

## 2018-08-30 RX ORDER — ALBUTEROL SULFATE 90 UG/1
2 AEROSOL, METERED RESPIRATORY (INHALATION) EVERY 6 HOURS PRN
Qty: 1 INHALER | Refills: 5 | Status: SHIPPED | OUTPATIENT
Start: 2018-08-30

## 2018-08-30 NOTE — ASSESSMENT & PLAN NOTE
The description of his episodes along with PFT results would suggest that he has mild intermittent asthma  His symptoms are not significant enough or for occurring frequently enough to warrant daily inhaler therapy  I have prescribed an albuterol inhaler to use on an as-needed basis  He has been very good about avoiding the environments that seem to provoke his symptoms  He will undergo blood work to evaluate for an allergic component  I will call him with those results

## 2018-09-10 DIAGNOSIS — I25.5 ISCHEMIC CARDIOMYOPATHY: ICD-10-CM

## 2018-09-10 DIAGNOSIS — I47.2 VENTRICULAR TACHYCARDIA (PAROXYSMAL) (HCC): ICD-10-CM

## 2018-09-10 DIAGNOSIS — I49.8 BRUGADA SYNDROME: Primary | ICD-10-CM

## 2018-09-18 RX ORDER — SODIUM CHLORIDE 9 MG/ML
50 INJECTION, SOLUTION INTRAVENOUS CONTINUOUS
Status: CANCELLED | OUTPATIENT
Start: 2018-09-18

## 2018-09-19 ENCOUNTER — ANESTHESIA (OUTPATIENT)
Dept: SURGERY | Facility: HOSPITAL | Age: 53
End: 2018-09-19
Payer: COMMERCIAL

## 2018-09-19 ENCOUNTER — HOSPITAL ENCOUNTER (OUTPATIENT)
Dept: NON INVASIVE DIAGNOSTICS | Facility: HOSPITAL | Age: 53
Discharge: HOME | End: 2018-09-19
Attending: INTERNAL MEDICINE | Admitting: INTERNAL MEDICINE
Payer: COMMERCIAL

## 2018-09-19 ENCOUNTER — ANESTHESIA EVENT (OUTPATIENT)
Dept: SURGERY | Facility: HOSPITAL | Age: 53
End: 2018-09-19
Payer: COMMERCIAL

## 2018-09-19 VITALS
HEIGHT: 73 IN | BODY MASS INDEX: 28.49 KG/M2 | DIASTOLIC BLOOD PRESSURE: 67 MMHG | TEMPERATURE: 98.1 F | WEIGHT: 215 LBS | OXYGEN SATURATION: 94 % | RESPIRATION RATE: 18 BRPM | SYSTOLIC BLOOD PRESSURE: 106 MMHG | HEART RATE: 53 BPM

## 2018-09-19 DIAGNOSIS — I49.8 BRUGADA SYNDROME: ICD-10-CM

## 2018-09-19 DIAGNOSIS — I25.5 ISCHEMIC CARDIOMYOPATHY: ICD-10-CM

## 2018-09-19 DIAGNOSIS — I47.2 VENTRICULAR TACHYCARDIA (PAROXYSMAL) (HCC): ICD-10-CM

## 2018-09-19 LAB
ANION GAP SERPL CALCULATED.3IONS-SCNC: 5 MMOL/L (ref 4–13)
ATRIAL RATE: 60 BPM
ATRIAL RATE: 65 BPM
BUN SERPL-MCNC: 17 MG/DL (ref 5–25)
CALCIUM SERPL-MCNC: 8.4 MG/DL (ref 8.3–10.1)
CHLORIDE SERPL-SCNC: 107 MMOL/L (ref 100–108)
CO2 SERPL-SCNC: 23 MMOL/L (ref 21–32)
CREAT SERPL-MCNC: 0.96 MG/DL (ref 0.6–1.3)
ERYTHROCYTE [DISTWIDTH] IN BLOOD BY AUTOMATED COUNT: 12.8 % (ref 11.6–15.1)
GFR SERPL CREATININE-BSD FRML MDRD: 89 ML/MIN/1.73SQ M
GLUCOSE P FAST SERPL-MCNC: 94 MG/DL (ref 65–99)
GLUCOSE SERPL-MCNC: 94 MG/DL (ref 65–140)
HCT VFR BLD AUTO: 39.5 % (ref 36.5–49.3)
HGB BLD-MCNC: 13.7 G/DL (ref 12–17)
INR PPP: 1.08 (ref 0.86–1.17)
MCH RBC QN AUTO: 29.8 PG (ref 26.8–34.3)
MCHC RBC AUTO-ENTMCNC: 34.7 G/DL (ref 31.4–37.4)
MCV RBC AUTO: 86 FL (ref 82–98)
P AXIS: 62 DEGREES
P AXIS: 72 DEGREES
PLATELET # BLD AUTO: 170 THOUSANDS/UL (ref 149–390)
PMV BLD AUTO: 10.1 FL (ref 8.9–12.7)
POTASSIUM SERPL-SCNC: 3.9 MMOL/L (ref 3.5–5.3)
PR INTERVAL: 154 MS
PR INTERVAL: 162 MS
PROTHROMBIN TIME: 14.1 SECONDS (ref 11.8–14.2)
QRS AXIS: 14 DEGREES
QRS AXIS: 25 DEGREES
QRSD INTERVAL: 158 MS
QRSD INTERVAL: 178 MS
QT INTERVAL: 472 MS
QT INTERVAL: 474 MS
QTC INTERVAL: 472 MS
QTC INTERVAL: 492 MS
RBC # BLD AUTO: 4.59 MILLION/UL (ref 3.88–5.62)
SODIUM SERPL-SCNC: 135 MMOL/L (ref 136–145)
T WAVE AXIS: -20 DEGREES
T WAVE AXIS: -34 DEGREES
VENTRICULAR RATE: 60 BPM
VENTRICULAR RATE: 65 BPM
WBC # BLD AUTO: 5.07 THOUSAND/UL (ref 4.31–10.16)

## 2018-09-19 PROCEDURE — 33262 RMVL& REPLC PULSE GEN 1 LEAD: CPT | Performed by: INTERNAL MEDICINE

## 2018-09-19 PROCEDURE — 93010 ELECTROCARDIOGRAM REPORT: CPT | Performed by: INTERNAL MEDICINE

## 2018-09-19 PROCEDURE — 85610 PROTHROMBIN TIME: CPT | Performed by: PHYSICIAN ASSISTANT

## 2018-09-19 PROCEDURE — 80048 BASIC METABOLIC PNL TOTAL CA: CPT | Performed by: PHYSICIAN ASSISTANT

## 2018-09-19 PROCEDURE — 85027 COMPLETE CBC AUTOMATED: CPT | Performed by: PHYSICIAN ASSISTANT

## 2018-09-19 PROCEDURE — C1722 AICD, SINGLE CHAMBER: HCPCS

## 2018-09-19 PROCEDURE — 93005 ELECTROCARDIOGRAM TRACING: CPT

## 2018-09-19 RX ORDER — MIDAZOLAM HYDROCHLORIDE 1 MG/ML
INJECTION INTRAMUSCULAR; INTRAVENOUS AS NEEDED
Status: DISCONTINUED | OUTPATIENT
Start: 2018-09-19 | End: 2018-09-19 | Stop reason: SURG

## 2018-09-19 RX ORDER — FENTANYL CITRATE 50 UG/ML
INJECTION, SOLUTION INTRAMUSCULAR; INTRAVENOUS AS NEEDED
Status: DISCONTINUED | OUTPATIENT
Start: 2018-09-19 | End: 2018-09-19 | Stop reason: SURG

## 2018-09-19 RX ORDER — LIDOCAINE HYDROCHLORIDE AND EPINEPHRINE 10; 10 MG/ML; UG/ML
INJECTION, SOLUTION INFILTRATION; PERINEURAL CODE/TRAUMA/SEDATION MEDICATION
Status: COMPLETED | OUTPATIENT
Start: 2018-09-19 | End: 2018-09-19

## 2018-09-19 RX ORDER — LIDOCAINE HYDROCHLORIDE 10 MG/ML
INJECTION, SOLUTION INFILTRATION; PERINEURAL CODE/TRAUMA/SEDATION MEDICATION
Status: COMPLETED | OUTPATIENT
Start: 2018-09-19 | End: 2018-09-19

## 2018-09-19 RX ORDER — PROPOFOL 10 MG/ML
INJECTION, EMULSION INTRAVENOUS AS NEEDED
Status: DISCONTINUED | OUTPATIENT
Start: 2018-09-19 | End: 2018-09-19 | Stop reason: SURG

## 2018-09-19 RX ORDER — ONDANSETRON 2 MG/ML
INJECTION INTRAMUSCULAR; INTRAVENOUS AS NEEDED
Status: DISCONTINUED | OUTPATIENT
Start: 2018-09-19 | End: 2018-09-19 | Stop reason: SURG

## 2018-09-19 RX ORDER — ACETAMINOPHEN 325 MG/1
650 TABLET ORAL EVERY 4 HOURS PRN
Status: DISCONTINUED | OUTPATIENT
Start: 2018-09-19 | End: 2018-09-19 | Stop reason: HOSPADM

## 2018-09-19 RX ORDER — SODIUM CHLORIDE 9 MG/ML
50 INJECTION, SOLUTION INTRAVENOUS CONTINUOUS
Status: DISCONTINUED | OUTPATIENT
Start: 2018-09-19 | End: 2018-09-19 | Stop reason: HOSPADM

## 2018-09-19 RX ORDER — GENTAMICIN SULFATE 40 MG/ML
INJECTION, SOLUTION INTRAMUSCULAR; INTRAVENOUS CODE/TRAUMA/SEDATION MEDICATION
Status: COMPLETED | OUTPATIENT
Start: 2018-09-19 | End: 2018-09-19

## 2018-09-19 RX ORDER — PROPOFOL 10 MG/ML
INJECTION, EMULSION INTRAVENOUS CONTINUOUS PRN
Status: DISCONTINUED | OUTPATIENT
Start: 2018-09-19 | End: 2018-09-19 | Stop reason: SURG

## 2018-09-19 RX ADMIN — LIDOCAINE HYDROCHLORIDE 26 ML: 10 INJECTION, SOLUTION INFILTRATION; PERINEURAL at 14:21

## 2018-09-19 RX ADMIN — CEFAZOLIN SODIUM 2000 MG: 2 SOLUTION INTRAVENOUS at 13:54

## 2018-09-19 RX ADMIN — PROPOFOL 70 MCG/KG/MIN: 10 INJECTION, EMULSION INTRAVENOUS at 13:49

## 2018-09-19 RX ADMIN — GENTAMICIN SULFATE 80 MG: 40 INJECTION, SOLUTION INTRAMUSCULAR; INTRAVENOUS at 14:25

## 2018-09-19 RX ADMIN — GENTAMICIN SULFATE 80 MG: 40 INJECTION, SOLUTION INTRAMUSCULAR; INTRAVENOUS at 14:47

## 2018-09-19 RX ADMIN — FENTANYL CITRATE 50 MCG: 50 INJECTION, SOLUTION INTRAMUSCULAR; INTRAVENOUS at 13:44

## 2018-09-19 RX ADMIN — LIDOCAINE HYDROCHLORIDE AND EPINEPHRINE 8 ML: 10; 10 INJECTION, SOLUTION INFILTRATION; PERINEURAL at 14:49

## 2018-09-19 RX ADMIN — PROPOFOL 30 MG: 10 INJECTION, EMULSION INTRAVENOUS at 14:30

## 2018-09-19 RX ADMIN — ONDANSETRON 4 MG: 2 INJECTION INTRAMUSCULAR; INTRAVENOUS at 14:55

## 2018-09-19 RX ADMIN — SODIUM CHLORIDE: 0.9 INJECTION, SOLUTION INTRAVENOUS at 13:05

## 2018-09-19 RX ADMIN — MIDAZOLAM 2 MG: 1 INJECTION INTRAMUSCULAR; INTRAVENOUS at 13:44

## 2018-09-19 RX ADMIN — VANCOMYCIN HYDROCHLORIDE 1.5 G: 1 INJECTION, POWDER, LYOPHILIZED, FOR SOLUTION INTRAVENOUS at 14:29

## 2018-09-19 RX ADMIN — PROPOFOL 80 MG: 10 INJECTION, EMULSION INTRAVENOUS at 13:49

## 2018-09-19 RX ADMIN — LIDOCAINE HYDROCHLORIDE 50 MG: 20 INJECTION, SOLUTION INTRAVENOUS at 13:49

## 2018-09-19 NOTE — INTERVAL H&P NOTE
EB is a 52year old male w/ [employer] single chamber ICD (2001) gen change (2009) which was placed in [facility] due to presumed brugada syndrome who is a pt of Dr Amairani Montes for presents today for ICD gen change due to device reaching SUZAN on August 28th 2018  Since last seen by Dr Amairani Montes no new changes occurred to medical record  He has no concerns or complaints today  His EF is 55%

## 2018-09-19 NOTE — DISCHARGE INSTRUCTIONS
Please refer to post ICD implantation discharge instructions and restrictions below and your ICD booklet/temporary card  Keep incision dry for one week  Do not use lotions/powders/creams on incision  Remove outer bandage 48 hours after implantation  Leave underlying steri-strips in place, they will either fall off on their own or will be removed at 2 week follow up appointment  Please call the office if you notice redness, swelling, bleeding, or drainage from incision or if you develop fevers  Implantable Cardioverter Defibrillator      If you have any questions, please call [phone] to speak with a nurse (8:30am-4pm, or [phone] after hours)  For appointments, please call [phone]  WHAT YOU SHOULD KNOW:    An implantable cardioverter defibrillator (ICD) is a small device that monitors your heart rate and rhythm  It is commonly placed inside your upper chest region  It may be used if you have a ventricular arrhythmia, which is an irregular, dangerous rhythm from the bottom chamber of your heart  Some arrhythmias may cause your heart to suddenly stop beating  An ICD can give a shock to your heart to make it start beating again, or it can give pacing therapy (also known as pain-free therapy) to return your heart to normal rhythm  It is also a pacemaker, so it will pace your heart if needed to prevent it from beating too slowly            AFTER YOU LEAVE:      Follow up with your primary healthcare provider or cardiologist as directed: You will need to follow-up to have your ICD checked and make sure you are not having problems  Write down your questions so you remember to ask them during your visits  Self-care:   · Ask about activity: Ask if you need to avoid moving your shoulder or arm, and for how long  Ask if you should avoid lifting heavy objects   Do not play any contact sports, such as football or wrestling, until your primary healthcare provider [facility] or cardiologist tells you it is okay  You may only be able to drive for a certain amount of time per day, or during certain hours  Ask when you can return to work  · Care for your skin over the ICD: Ask your PHP or cardiologist when it is okay for you to bathe  Do not put any lotion or powder on the incision area  Do not rub or wear tight clothing over the ICD area until your PHP or cardiologist tells you it is okay  When you get a shock from your ICD: A shock may feel like someone has hit you, or you may feel a thump in the chest  If someone is touching you when you get a shock, they may feel a tingling feeling  The first time you feel a shock, it may scare you  Sit or lie down and stay calm  Ask someone to stay with you if possible  Please either call your cardiologist or report to an emergency room  Safety instructions when you have an ICD:   · Carry an ID card for the ICD: This card has important information about your ICD  · Stay away from magnets or machines with electric fields: This includes MRI machines  Avoid leaning into a car engine or doing welding  These things can interfere with how your ICD works  · Tell airport security you have an ICD: You may need to be searched by hand when you go through a security gate  The security gate or handheld wand could harm your ICD  · Keep an ICD diary: Record when you get a shock and what you were doing before you got the shock  Keep track of how you felt before and after the shock, as well as how many shocks you received  Write down the day and time of each shock  Bring the diary with you when you see your PHP or cardiologist    · Carry medical alert identification: Wear medical alert jewelry or carry a card that says you have an ICD  Ask your PHP or cardiologist where to get these items  Contact your primary healthcare provider or cardiologist if:   · You have a fever  · You feel 1 or more shocks from your ICD and feel fine afterwards  · Your feet or ankles swell     · The area around your ICD is painful or tender after surgery  · The skin around your stitches or staples is red, swollen, or draining pus or fluid  · You have chills, a cough, and feel weak or achy  · You are sad or anxious and find it hard to do your usual activities  · You have questions or concerns about your condition or care  Seek care immediately or call 911 if:   · Your stitches or staples come apart  · Blood soaks through your bandage  · You feel more than 3 shocks in a row from your ICD  · You become weak, dizzy, or faint  · You feel your heart skip beats or beat very fast or slow, but you do not feel a shock from your ICD  · You have chest pain that does not go away with rest or medicine  © 2014 [address] [employer] is for End User's use only and may not be sold, redistributed or otherwise used for commercial purposes  All illustrations and images included in CareNotes® are the copyrighted property of A D A M , Inc  or Juan Jose Rogers  The above information is an  only  It is not intended as medical advice for individual conditions or treatments  Talk to your doctor, nurse or pharmacist before following any medical regimen to see if it is safe and effective for you

## 2018-09-19 NOTE — ANESTHESIA PREPROCEDURE EVALUATION
Review of Systems/Medical History          Cardiovascular  Pacemaker/AICD, Hyperlipidemia, Hypertension , Past MI > 6 months, CAD , CAD status: 1VD, Cardiac stents     Pulmonary  Asthma , well controlled/ stable ,        GI/Hepatic  Negative GI/hepatic ROS          Negative  ROS        Endo/Other  Negative endo/other ROS      GYN  Negative gynecology ROS          Hematology  Negative hematology ROS      Musculoskeletal  Negative musculoskeletal ROS        Neurology  Negative neurology ROS      Psychology           Physical Exam    Airway    Mallampati score: II  TM Distance: >3 FB  Neck ROM: full     Dental   upper dentures,     Cardiovascular  Rhythm: regular, Rate: normal,     Pulmonary  Breath sounds clear to auscultation,     Other Findings        Anesthesia Plan  ASA Score- 3     Anesthesia Type- IV sedation with anesthesia with ASA Monitors  Additional Monitors:   Airway Plan:         Plan Factors-  Patient did not smoke on day of surgery  Induction-     Postoperative Plan-     Informed Consent- Anesthetic plan and risks discussed with patient and spouse  I personally reviewed this patient with the CRNA  Discussed and agreed on the Anesthesia Plan with the CRNA  Maddie Moralez

## 2018-09-19 NOTE — H&P (VIEW-ONLY)
Pulmonary Outpatient Consultation Note   Jenni Odom 52 y o  male MRN: [id number]  8/30/2018    Assessment/Plan:    Mild intermittent asthma without complication   The description of his episodes along with PFT results would suggest that he has mild intermittent asthma  His symptoms are not significant enough or for occurring frequently enough to warrant daily inhaler therapy  I have prescribed an albuterol inhaler to use on an as-needed basis  He has been very good about avoiding the environments that seem to provoke his symptoms  He will undergo blood work to evaluate for an allergic component  I will call him with those results  Visit orders:    Diagnoses and all orders for this visit:    Mild intermittent asthma without complication  -     albuterol (PROAIR HFA) 90 mcg/act inhaler; Inhale 2 puffs every 6 (six) hours as needed for shortness of breath  -     [facility] Allergy Panel, Adult; Future  -     IgE; Future    SOB (shortness of breath) on exertion  -     Ambulatory referral to Pulmonology        History of Present Illness   HPI:  Jenni Odom is a 52 y o  male who is here today for evaluation regarding shortness of breath  He has a remote history of asthma in childhood  He "grew out" of it and has not used inhalers in many years  He has a longstanding history of cardiac disease and follows closely with Dr Luzmaria Little  He developed episodes of extreme shortness of breath when exposed to dust, chemicals and humid weather  He also notes symptoms when under significant stress and sometimes when exercising  He does not have any issues in the cold weather  He has 2 cats at home but does not believe he is allergic to them  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  They have a coal burning stove at home  Review of Systems   Constitutional: Positive for unexpected weight change (  9 lb weight loss)  Negative for chills and fever  HENT: Negative for postnasal drip and sore throat  Eyes: Negative for visual disturbance  Respiratory:        As noted in HPI   Cardiovascular: Negative for chest pain  Gastrointestinal: Negative for abdominal pain, diarrhea and vomiting  Genitourinary: Negative for difficulty urinating  Skin: Negative for rash  Neurological: Negative for headaches  Hematological: Negative for adenopathy  Psychiatric/Behavioral: Negative  All other systems reviewed and are negative        Historical Information   Past Medical History:   Diagnosis Date    Bradycardia     Brugada syndrome     Cardiac disease     Hyperlipidemia     Hypertension     ICD (implantable cardioverter-defibrillator) in place     MI (myocardial infarction) ([facility] [address] )     per pt report    Pacemaker      Past Surgical History:   Procedure Laterality Date    CARDIAC CATHETERIZATION      CARDIAC DEFIBRILLATOR PLACEMENT      CARDIAC PACEMAKER PLACEMENT      LUMBAR FUSION      NO PAST SURGERIES       Family History   Problem Relation Age of Onset    Heart disease Mother          History   Smoking Status    Former Smoker    Packs/day: 1 00    Years: 15 00    Types: Cigarettes    Quit date: 1/19/2006   Smokeless Tobacco    Never Used       Meds/Allergies     Current Outpatient Prescriptions:     atorvastatin (LIPITOR) 40 mg tablet, Take 1 tablet (40 mg total) by mouth daily, Disp: 90 tablet, Rfl: 3    clopidogrel (PLAVIX) 75 mg tablet, Take 1 tablet (75 mg total) by mouth daily, Disp: 90 tablet, Rfl: 3    furosemide (LASIX) 20 mg tablet, Take 1 tablet (20 mg total) by mouth daily, Disp: 90 tablet, Rfl: 3    isosorbide mononitrate (IMDUR) 60 mg 24 hr tablet, Take 1 tablet (60 mg total) by mouth daily, Disp: 90 tablet, Rfl: 3    lisinopril (ZESTRIL) 5 mg tablet, Take 1 tablet (5 mg total) by mouth daily, Disp: 90 tablet, Rfl: 3    metoprolol succinate (TOPROL-XL) 25 mg 24 hr tablet, Take 1 tablet (25 mg total) by mouth 2 (two) times a day, Disp: 90 tablet, Rfl: 3    nitroglycerin (NITROSTAT) 0 4 mg SL tablet, Place 1 tablet (0 4 mg total) under the tongue every 5 (five) minutes as needed for chest pain, Disp: 30 tablet, Rfl: 0    albuterol (PROAIR HFA) 90 mcg/act inhaler, Inhale 2 puffs every 6 (six) hours as needed for shortness of breath, Disp: 1 Inhaler, Rfl: 5    aspirin 81 mg chewable tablet, Chew 1 tablet (81 mg total) 2 (two) times a day for 30 days, Disp: 90 tablet, Rfl: 3  No Known Allergies    Vitals: Blood pressure 110/76, pulse 66, temperature 97 5 °F (36 4 °C), temperature source Tympanic, height 6' 1" (1 854 m), weight 97 6 kg (215 lb 3 2 oz), SpO2 96 %  , Body mass index is 28 39 kg/m²  Oxygen Therapy  SpO2: 96 %  Oxygen Therapy: None (Room air)    Physical Exam   Physical Exam   Constitutional: He is oriented to person, place, and time  No distress  HENT:   Head: Normocephalic  Mouth/Throat: No oropharyngeal exudate  Eyes: Pupils are equal, round, and reactive to light  No scleral icterus  Neck: Neck supple  No JVD present  Cardiovascular: Normal rate and regular rhythm  Pulmonary/Chest: He has no wheezes  He has no rales  Abdominal: Soft  There is no tenderness  Musculoskeletal: He exhibits no edema  Lymphadenopathy:     He has no cervical adenopathy  Neurological: He is alert and oriented to person, place, and time  Skin: Skin is warm and dry  Psychiatric: He has a normal mood and affect  Labs: I have personally reviewed pertinent lab results    Lab Results   Component Value Date    WBC 7 44 05/19/2018    HGB 14 6 05/19/2018    HCT 40 7 05/19/2018    MCV 84 05/19/2018     05/19/2018     Lab Results   Component Value Date    GLUCOSE 112 (H) 05/23/2017    CALCIUM 9 2 05/20/2018     05/20/2018    K 4 2 05/20/2018    CO2 24 05/20/2018     05/20/2018    BUN 15 05/20/2018    CREATININE 0 91 05/20/2018     No results found for: IGE  Lab Results   Component Value Date    ALT 51 05/19/2018    AST 31 05/19/2018    ALKPHOS 79 05/19/2018    BILITOT 0 7 05/23/2017       Imaging and other studies: I have personally reviewed pertinent films in PACS   chest CT from May 2018 shows no evidence of pulmonary embolism  Lungs are clear      Pulmonary function testing:  Performed   6/13/18   FEV1/FVC ratio 76%    FEV1 98% predicted  FVC 98% predicted  No response to bronchodilators   % predicted   % predicted  DLCO corrected for hemoglobin 89 % predicted   methacholine challenge testing showed  Borderline hyper responsiveness

## 2018-09-19 NOTE — ANESTHESIA POSTPROCEDURE EVALUATION
Post-Op Assessment Note      CV Status:  Stable    Mental Status:  Alert and awake    Hydration Status:  Euvolemic    PONV Controlled:  Controlled    Airway Patency:  Patent    Post Op Vitals Reviewed: Yes          Staff: CRNA           BP   121/86   Temp      Pulse  75   Resp   14   SpO2   97%

## 2018-10-11 ENCOUNTER — IN-CLINIC DEVICE VISIT (OUTPATIENT)
Dept: CARDIOLOGY CLINIC | Facility: CLINIC | Age: 53
End: 2018-10-11

## 2018-10-11 DIAGNOSIS — I47.2 VENTRICULAR TACHYARRHYTHMIA (HCC): Primary | ICD-10-CM

## 2018-10-11 DIAGNOSIS — Z95.810 AICD (AUTOMATIC CARDIOVERTER/DEFIBRILLATOR) PRESENT: ICD-10-CM

## 2018-10-11 DIAGNOSIS — Z45.02 ENCOUNTER FOR IMPLANTABLE DEFIBRILLATOR REPROGRAMMING OR CHECK: ICD-10-CM

## 2018-10-11 PROCEDURE — 99024 POSTOP FOLLOW-UP VISIT: CPT | Performed by: INTERNAL MEDICINE

## 2018-10-11 NOTE — PROGRESS NOTES
Results for orders placed or performed in visit on 10/11/18   Cardiac EP device report    Narrative    DEVICE INTERROGATED IN THE [facility] OFFICE  WOUND CHECK: INCISION CLEAN AND DRY WITH EDGES APPROXIMATED; SWELLING @ SITE, SOFT TO PALPATION- PT & WIFE STATED SWELLING HAS DECREASED; WOUND CARE AND RESTRICTIONS REVIEWED WITH PATIENT  BATTERY VOLTAGE ADEQUATE (11 YRS)  <0 1%  ALL  LEAD PARAMETERS WITHIN NORMAL LIMITS  NO SIGNIFICANT HIGH RATE EPISODES  OPTIVOL INITIALIZING  DECREASE MADE TO AMPLITUDE TO PROMOTE DEVICE LONGEVITY WHILE MAINTAINING AN APPROPRIATE SAFETY MARGIN  NORMAL DEVICE FUNCTION   GV

## 2018-10-19 ENCOUNTER — OFFICE VISIT (OUTPATIENT)
Dept: CARDIOLOGY CLINIC | Facility: CLINIC | Age: 53
End: 2018-10-19
Payer: COMMERCIAL

## 2018-10-19 VITALS
BODY MASS INDEX: 29.03 KG/M2 | OXYGEN SATURATION: 97 % | SYSTOLIC BLOOD PRESSURE: 116 MMHG | WEIGHT: 219 LBS | HEIGHT: 73 IN | DIASTOLIC BLOOD PRESSURE: 64 MMHG | HEART RATE: 76 BPM

## 2018-10-19 DIAGNOSIS — Z95.810 ICD (IMPLANTABLE CARDIOVERTER-DEFIBRILLATOR) IN PLACE: ICD-10-CM

## 2018-10-19 DIAGNOSIS — I10 ESSENTIAL HYPERTENSION: ICD-10-CM

## 2018-10-19 DIAGNOSIS — E78.2 MIXED HYPERLIPIDEMIA: ICD-10-CM

## 2018-10-19 DIAGNOSIS — I10 BENIGN ESSENTIAL HYPERTENSION: ICD-10-CM

## 2018-10-19 DIAGNOSIS — I25.10 ATHEROSCLEROSIS OF NATIVE CORONARY ARTERY OF NATIVE HEART WITHOUT ANGINA PECTORIS: Primary | ICD-10-CM

## 2018-10-19 PROCEDURE — 99214 OFFICE O/P EST MOD 30 MIN: CPT | Performed by: INTERNAL MEDICINE

## 2018-10-19 NOTE — PROGRESS NOTES
Cardiology Follow Up    Erma Gomes  1965  [id number]  [facility] [address] [facility] [place] [address] [address] [id number]    1  Atherosclerosis of native coronary artery of native heart without angina pectoris     2  Benign essential hypertension     3  Essential hypertension     4  Mixed hyperlipidemia     5  ICD (implantable cardioverter-defibrillator) in place         Discussion/Summary:  Overall he has been feeling better since our last appointment  Genetic testing showed that there was no definitive brugada syndrome  However due to the fact that the exact placement of his device was done in [facility] will maintain his defibrillator support  Recent device interrogation shows normal function  Can change has healed well  Blood pressures been well controlled  I suspect he has exercise-induced asthma and recommend uses inhaler prior to activities  Coronary disease stable  Lipids have gone up because he has stopped taking the atorvastatin I stressed the importance of this medication and he will resume  Interval History:     57-year-old gentleman with a history of coronary artery disease status post PCI of the circumflex, hypertension, hyperlipidemia, implantable defibrillator presents for a Follow-up visit overall he has been feeling better with more energy following our last visit  The episodes of lightheadedness and shortness of breath have improved but have not resolved  He denies any chest pain or discomfort there has been no palpitations, lightheadedness, syncope  He denies any lower extremity edema, PND, orthopnea  He underwent genetic testing which did not reveal any brugada syndrome  He has a stressful job and has had some shortness of breath with physical activity  Pulmonary function testing showed asthma  There has been no syncope  Recent 10 change went well      Problem List     Dyspnea on exertion Dizziness    Brugada syndrome    Overview Addendum 4/18/2018  7:34 AM by Faustina Montalvo DO     Last Assessment & Plan:   Follows with device clinic  Ischemic cardiomyopathy    Coronary atherosclerosis    Overview Signed 4/18/2018  7:34 AM by Faustina Montalvo DO     Overview:   Cath @ [facility]: OM1 - 100% , unable to revascularize  LAD 30%, RCA 40%  EF: 40%    Last Assessment & Plan:   Continues with left sided chest discomfort  He will try to increase Imdur to 30mg TID to see if he can tolerate increased dosing  If unable will go back down to twice per day  Chest pain    MI (myocardial infarction) [facility])    Overview Signed 6/14/2017  6:51 PM by Renee Bejarano PA-C     per pt report         ICD (implantable cardioverter-defibrillator) in place    Hypertension    Benign essential hypertension    Overview Signed 2/5/2018  7:56 AM by Faustina Montalvo DO     Last Assessment & Plan:   Controlled  Cardiomyopathy ([facility] [address] )    Hyperlipidemia    NSVT (nonsustained ventricular tachycardia) ([facility] [address] )    Pacemaker    Overview Signed 2/5/2018  7:56 AM by Faustina Montalvo DO     Last [address] [place] [address]:   Follows with device clinic         HFrEF (heart failure with reduced ejection fraction) ([facility] [address] )    Overview Signed 4/18/2018  7:34 AM by Faustina Montalvo DO     Last Assessment & Plan:   NYHA Class II  Appears euvolemic  Will continue lasix use as this improved his symptoms  Will need to continue to up titrate his medications however due to continued chest discomfort will try an increase in Imdur first  If tolerated he will increase lisinopril to 10mg daily  BMP next week  We discussed the importance of daily weights, adherence to a 2gm sodium dietary restriction, s/s of heart failure and when to call our office               Past Medical History:   Diagnosis Date    Bradycardia     Brugada syndrome     Cardiac disease     Hyperlipidemia     Hypertension     ICD (implantable cardioverter-defibrillator) in place     MI (myocardial infarction) ([facility] [address] )     per pt report    Pacemaker      Social History     Social History    Marital status: /Civil Union     Spouse name: N/A    Number of children: N/A    Years of education: N/A     Occupational History    Not on file       Social History Main Topics    Smoking status: Former Smoker     Packs/day: 1 00     Years: 15 00     Types: Cigarettes     Quit date: 1/19/2006    Smokeless tobacco: Never Used    Alcohol use 2 4 oz/week     2 Glasses of wine, 2 Cans of beer per week      Comment: Social    Drug use: No    Sexual activity: Not on file     Other Topics Concern    Not on file     Social History Narrative    No narrative on file      Family History   Problem Relation Age of Onset    Heart disease Mother     Heart attack Mother 36    Hypertension Mother     Arrhythmia Sister     Coronary artery disease Sister     Cancer Maternal Grandmother     Stroke Neg Hx     Anuerysm Neg Hx     Clotting disorder Neg Hx     Heart failure Neg Hx     Hyperlipidemia Neg Hx      Past Surgical History:   Procedure Laterality Date    CARDIAC CATHETERIZATION      CARDIAC DEFIBRILLATOR PLACEMENT      CARDIAC ICD GENERATOR CHANGE  9/19/2018    CARDIAC PACEMAKER PLACEMENT      LUMBAR FUSION      NO PAST SURGERIES         Current Outpatient Prescriptions:     albuterol (PROAIR HFA) 90 mcg/act inhaler, Inhale 2 puffs every 6 (six) hours as needed for shortness of breath, Disp: 1 Inhaler, Rfl: 5    aspirin 81 mg chewable tablet, Chew 1 tablet (81 mg total) 2 (two) times a day for 30 days, Disp: 90 tablet, Rfl: 3    clopidogrel (PLAVIX) 75 mg tablet, Take 1 tablet (75 mg total) by mouth daily, Disp: 90 tablet, Rfl: 3    furosemide (LASIX) 20 mg tablet, Take 1 tablet (20 mg total) by mouth daily, Disp: 90 tablet, Rfl: 3    isosorbide mononitrate (IMDUR) 60 mg 24 hr tablet, Take 1 tablet (60 mg total) by mouth daily, Disp: 90 tablet, Rfl: 3    lisinopril (ZESTRIL) 5 mg tablet, Take 1 tablet (5 mg total) by mouth daily, Disp: 90 tablet, Rfl: 3    metoprolol succinate (TOPROL-XL) 25 mg 24 hr tablet, Take 1 tablet (25 mg total) by mouth 2 (two) times a day (Patient taking differently: Take 25 mg by mouth daily  ), Disp: 90 tablet, Rfl: 3    atorvastatin (LIPITOR) 40 mg tablet, Take 1 tablet (40 mg total) by mouth daily (Patient not taking: Reported on 10/19/2018 ), Disp: 90 tablet, Rfl: 3    nitroglycerin (NITROSTAT) 0 4 mg SL tablet, Place 1 tablet (0 4 mg total) under the tongue every 5 (five) minutes as needed for chest pain (Patient not taking: Reported on 10/19/2018 ), Disp: 30 tablet, Rfl: 0  No Known Allergies    Labs:     Chemistry        Component Value Date/Time     (L) 09/19/2018 1009     05/23/2017 1055    K 3 9 09/19/2018 1009    K 4 1 05/23/2017 1055     09/19/2018 1009     05/23/2017 1055    CO2 23 09/19/2018 1009    CO2 22 05/23/2017 1055    BUN 17 09/19/2018 1009    BUN 13 05/23/2017 1055    CREATININE 0 96 09/19/2018 1009    CREATININE 0 95 05/23/2017 1055        Component Value Date/Time    CALCIUM 8 4 09/19/2018 1009    CALCIUM 9 7 05/23/2017 1055    ALKPHOS 79 05/19/2018 1147    ALKPHOS 81 05/23/2017 1055    AST 31 05/19/2018 1147    AST 27 05/23/2017 1055    ALT 51 05/19/2018 1147    ALT 28 05/23/2017 1055    BILITOT 0 7 05/23/2017 1055            No results found for: CHOL  Lab Results   Component Value Date    HDL 30 (L) 05/20/2018    HDL 30 (L) 01/19/2018    HDL 33 (L) 06/15/2017     Lab Results   Component Value Date    LDLCALC 93 05/20/2018    LDLCALC 52 01/19/2018    LDLCALC 61 06/15/2017     Lab Results   Component Value Date    TRIG 150 05/20/2018    TRIG 157 (H) 01/19/2018    TRIG 63 06/15/2017     No components found for: CHOLHDL    Imaging: No results found      ECG:  Sinus rhythm right bundle branch block nonspecific lateral T-wave changes      ROS    Vitals:    10/19/18 1116   BP: 116/64 Pulse: 76   SpO2: 97%     Vitals:    10/19/18 1116   Weight: 99 3 kg (219 lb)     Height: 6' 1" (185 4 cm)   Body mass index is 28 89 kg/m²      Physical Exam:   General appearance:  Appears stated age, alert, well appearing and in no distress  HEENT:  PERRLA, EOMI, no scleral icterus, no conjunctival pallor  NECK:  Supple, No elevated JVP, no thyromegaly, no carotid bruits  HEART:  Regular rate and rhythm, normal S1/S2, no S3/S4, no murmur or rub  LUNGS:  Clear to auscultation bilaterally, no wheezes rales or rhonchi  ABDOMEN:  Soft, non-tender, positive bowel sounds, no rebound or guarding, no organomegaly   EXTREMITIES:  No edema, normal range of motion  VASCULAR:  Normal pedal pulses, good pulse volume   SKIN: No lesions or rashes on exposed skin  NEURO:  CN II-XII intact, no focal deficits

## 2018-11-13 ENCOUNTER — OFFICE VISIT (OUTPATIENT)
Dept: FAMILY MEDICINE CLINIC | Facility: CLINIC | Age: 53
End: 2018-11-13
Payer: COMMERCIAL

## 2018-11-13 VITALS
SYSTOLIC BLOOD PRESSURE: 110 MMHG | BODY MASS INDEX: 30.35 KG/M2 | OXYGEN SATURATION: 99 % | HEART RATE: 73 BPM | HEIGHT: 73 IN | WEIGHT: 229 LBS | TEMPERATURE: 98.4 F | DIASTOLIC BLOOD PRESSURE: 72 MMHG

## 2018-11-13 DIAGNOSIS — J03.90 TONSILLITIS: Primary | ICD-10-CM

## 2018-11-13 DIAGNOSIS — R59.0 CERVICAL LYMPHADENOPATHY: ICD-10-CM

## 2018-11-13 PROCEDURE — 99213 OFFICE O/P EST LOW 20 MIN: CPT | Performed by: FAMILY MEDICINE

## 2018-11-13 PROCEDURE — 1036F TOBACCO NON-USER: CPT | Performed by: FAMILY MEDICINE

## 2018-11-13 PROCEDURE — 3008F BODY MASS INDEX DOCD: CPT | Performed by: FAMILY MEDICINE

## 2018-11-13 RX ORDER — AMOXICILLIN 875 MG/1
875 TABLET, COATED ORAL 2 TIMES DAILY
Qty: 20 TABLET | Refills: 0 | Status: SHIPPED | OUTPATIENT
Start: 2018-11-13 | End: 2018-11-23

## 2018-11-13 NOTE — PROGRESS NOTES
Subjective:      Patient ID: Melecio Dang is a 52 y o  male  Sore Throat    This is a chronic problem  Episode onset: 6 WEEKS AGO  The problem has been unchanged  The pain is worse on the left side  There has been no fever  The pain is at a severity of 6/10  The pain is moderate  Associated symptoms include a hoarse voice and trouble swallowing (LEFT SIDED PAIN)  Pertinent negatives include no congestion, coughing, drooling, ear discharge, ear pain, headaches, neck pain or shortness of breath  He has had no exposure to strep or mono  He has tried gargles for the symptoms  The treatment provided no relief  Patient feels swelling on left side of his neck  Past Medical History:   Diagnosis Date    Bradycardia     Brugada syndrome     Cardiac disease     Hyperlipidemia     Hypertension     ICD (implantable cardioverter-defibrillator) in place     MI (myocardial infarction) ([facility] [address] )     per pt report    Pacemaker        Family History   Problem Relation Age of Onset    Heart disease Mother     Heart attack Mother 36    Hypertension Mother     Arrhythmia Sister     Coronary artery disease Sister     Cancer Maternal Grandmother     Stroke Neg Hx     Anuerysm Neg Hx     Clotting disorder Neg Hx     Heart failure Neg Hx     Hyperlipidemia Neg Hx        Past Surgical History:   Procedure Laterality Date    CARDIAC CATHETERIZATION      CARDIAC DEFIBRILLATOR PLACEMENT      CARDIAC ICD GENERATOR CHANGE  9/19/2018    CARDIAC PACEMAKER PLACEMENT      LUMBAR FUSION      NO PAST SURGERIES          reports that he quit smoking about 12 years ago  His smoking use included Cigarettes  He has a 15 00 pack-year smoking history  He has never used smokeless tobacco  He reports that he drinks about 2 4 oz of alcohol per week   He reports that he does not use drugs        Current Outpatient Prescriptions:     albuterol (PROAIR HFA) 90 mcg/act inhaler, Inhale 2 puffs every 6 (six) hours as needed for shortness of breath, Disp: 1 Inhaler, Rfl: 5    atorvastatin (LIPITOR) 40 mg tablet, Take 1 tablet (40 mg total) by mouth daily, Disp: 90 tablet, Rfl: 3    clopidogrel (PLAVIX) 75 mg tablet, Take 1 tablet (75 mg total) by mouth daily, Disp: 90 tablet, Rfl: 3    furosemide (LASIX) 20 mg tablet, Take 1 tablet (20 mg total) by mouth daily, Disp: 90 tablet, Rfl: 3    isosorbide mononitrate (IMDUR) 60 mg 24 hr tablet, Take 1 tablet (60 mg total) by mouth daily, Disp: 90 tablet, Rfl: 3    lisinopril (ZESTRIL) 5 mg tablet, Take 1 tablet (5 mg total) by mouth daily, Disp: 90 tablet, Rfl: 3    metoprolol succinate (TOPROL-XL) 25 mg 24 hr tablet, Take 1 tablet (25 mg total) by mouth 2 (two) times a day (Patient taking differently: Take 25 mg by mouth daily  ), Disp: 90 tablet, Rfl: 3    nitroglycerin (NITROSTAT) 0 4 mg SL tablet, Place 1 tablet (0 4 mg total) under the tongue every 5 (five) minutes as needed for chest pain, Disp: 30 tablet, Rfl: 0    amoxicillin (AMOXIL) 875 mg tablet, Take 1 tablet (875 mg total) by mouth 2 (two) times a day for 10 days, Disp: 20 tablet, Rfl: 0    aspirin 81 mg chewable tablet, Chew 1 tablet (81 mg total) 2 (two) times a day for 30 days, Disp: 90 tablet, Rfl: 3    The following portions of the patient's history were reviewed and updated as appropriate: allergies, current medications, past family history, past medical history, past social history, past surgical history and problem list     Review of Systems   Constitutional: Negative for chills and fever  HENT: Positive for hoarse voice, postnasal drip, sore throat and trouble swallowing (LEFT SIDED PAIN)  Negative for congestion, drooling, ear discharge, ear pain and rhinorrhea  Respiratory: Negative for cough, shortness of breath and wheezing  Cardiovascular: Negative for chest pain  Musculoskeletal: Negative for neck pain  Neurological: Negative for headaches             Objective:    /72   Pulse 73   Temp 98 4 °F (36 9 °C)   Ht 6' 1" (1 854 m)   Wt 104 kg (229 lb)   SpO2 99%   BMI 30 21 kg/m²      Physical Exam   Constitutional: He appears well-developed and well-nourished  HENT:   Right Ear: External ear normal    Left Ear: External ear normal    Mouth/Throat: Oropharynx is clear and moist  No oropharyngeal exudate  B/l TM clear   Cardiovascular: Normal rate and regular rhythm  Pulmonary/Chest: Effort normal and breath sounds normal    Abdominal: Soft  Bowel sounds are normal    Lymphadenopathy:     He has cervical adenopathy (LEFT SUBMANDIBULAR)  Psychiatric: His behavior is normal  Judgment normal          No results found for this or any previous visit (from the past 1008 hour(s))  Assessment/Plan:    Left cervical lymphadenopathy, for past 6 weeks  Started after an upper respiratory infection which has resolved now  Patient started on a trial of an oral antibiotic  If lymphadenopathy persist then patient will follow up  Diagnoses and all orders for this visit:    Tonsillitis  -     amoxicillin (AMOXIL) 875 mg tablet; Take 1 tablet (875 mg total) by mouth 2 (two) times a day for 10 days    Cervical lymphadenopathy  -     amoxicillin (AMOXIL) 875 mg tablet;  Take 1 tablet (875 mg total) by mouth 2 (two) times a day for 10 days

## 2019-03-21 DIAGNOSIS — I25.10 CORONARY ARTERY DISEASE INVOLVING NATIVE CORONARY ARTERY OF NATIVE HEART WITHOUT ANGINA PECTORIS: ICD-10-CM

## 2019-03-22 RX ORDER — ASPIRIN 81 MG/1
TABLET, CHEWABLE ORAL
Qty: 180 TABLET | Refills: 0 | Status: SHIPPED | OUTPATIENT
Start: 2019-03-22 | End: 2019-07-24 | Stop reason: SDUPTHER

## 2019-04-17 DIAGNOSIS — I10 HYPERTENSION, UNSPECIFIED TYPE: ICD-10-CM

## 2019-04-18 RX ORDER — METOPROLOL SUCCINATE 25 MG/1
TABLET, EXTENDED RELEASE ORAL
Qty: 180 TABLET | Refills: 0 | Status: SHIPPED | OUTPATIENT
Start: 2019-04-18 | End: 2019-07-24 | Stop reason: SDUPTHER

## 2019-04-25 ENCOUNTER — OFFICE VISIT (OUTPATIENT)
Dept: CARDIOLOGY CLINIC | Facility: CLINIC | Age: 54
End: 2019-04-25
Payer: COMMERCIAL

## 2019-04-25 VITALS
WEIGHT: 229.6 LBS | BODY MASS INDEX: 30.43 KG/M2 | OXYGEN SATURATION: 98 % | HEART RATE: 72 BPM | SYSTOLIC BLOOD PRESSURE: 146 MMHG | DIASTOLIC BLOOD PRESSURE: 74 MMHG | HEIGHT: 73 IN

## 2019-04-25 DIAGNOSIS — Z95.810 ICD (IMPLANTABLE CARDIOVERTER-DEFIBRILLATOR) IN PLACE: ICD-10-CM

## 2019-04-25 DIAGNOSIS — I25.10 ATHEROSCLEROSIS OF NATIVE CORONARY ARTERY OF NATIVE HEART WITHOUT ANGINA PECTORIS: ICD-10-CM

## 2019-04-25 DIAGNOSIS — I47.2 NSVT (NONSUSTAINED VENTRICULAR TACHYCARDIA) (HCC): ICD-10-CM

## 2019-04-25 DIAGNOSIS — I10 BENIGN ESSENTIAL HYPERTENSION: ICD-10-CM

## 2019-04-25 DIAGNOSIS — I49.8 BRUGADA SYNDROME: ICD-10-CM

## 2019-04-25 DIAGNOSIS — I25.5 ISCHEMIC CARDIOMYOPATHY: Primary | ICD-10-CM

## 2019-04-25 PROCEDURE — 93000 ELECTROCARDIOGRAM COMPLETE: CPT | Performed by: INTERNAL MEDICINE

## 2019-04-25 PROCEDURE — 99214 OFFICE O/P EST MOD 30 MIN: CPT | Performed by: INTERNAL MEDICINE

## 2019-04-25 RX ORDER — ROSUVASTATIN CALCIUM 5 MG/1
5 TABLET, COATED ORAL DAILY
Qty: 30 TABLET | Refills: 11 | Status: SHIPPED | OUTPATIENT
Start: 2019-04-25 | End: 2019-06-17 | Stop reason: SDUPTHER

## 2019-05-20 ENCOUNTER — TELEPHONE (OUTPATIENT)
Dept: CARDIOLOGY CLINIC | Facility: CLINIC | Age: 54
End: 2019-05-20

## 2019-06-17 DIAGNOSIS — I25.5 ISCHEMIC CARDIOMYOPATHY: ICD-10-CM

## 2019-06-17 DIAGNOSIS — I25.10 ATHEROSCLEROSIS OF NATIVE CORONARY ARTERY OF NATIVE HEART WITHOUT ANGINA PECTORIS: ICD-10-CM

## 2019-06-17 RX ORDER — ROSUVASTATIN CALCIUM 5 MG/1
5 TABLET, COATED ORAL DAILY
Qty: 90 TABLET | Refills: 2 | Status: SHIPPED | OUTPATIENT
Start: 2019-06-17 | End: 2020-08-24

## 2019-07-01 ENCOUNTER — REMOTE DEVICE CLINIC VISIT (OUTPATIENT)
Dept: CARDIOLOGY CLINIC | Facility: CLINIC | Age: 54
End: 2019-07-01
Payer: COMMERCIAL

## 2019-07-01 DIAGNOSIS — Z95.810 ICD (IMPLANTABLE CARDIOVERTER-DEFIBRILLATOR) IN PLACE: Primary | ICD-10-CM

## 2019-07-01 PROCEDURE — 93295 DEV INTERROG REMOTE 1/2/MLT: CPT | Performed by: INTERNAL MEDICINE

## 2019-07-01 PROCEDURE — 93296 REM INTERROG EVL PM/IDS: CPT | Performed by: INTERNAL MEDICINE

## 2019-07-01 NOTE — PROGRESS NOTES
MDT SINGLE ICD  CAREL[employer] TRANSMISSION: BATTERY VOLTAGE ADEQUATE (10 5 YRS)   - <0 1% ALL AVAILABLE LEAD PARAMETERS WITHIN NORMAL LIMITS  NO SIGNIFICANT HIGH RATE EPISODES  OPTI-VOL WITHIN NORMAL LIMITS   APPROPRIATELY FUNCTIONING ICD    EB

## 2019-07-24 DIAGNOSIS — I25.10 CORONARY ARTERY DISEASE INVOLVING NATIVE CORONARY ARTERY OF NATIVE HEART WITHOUT ANGINA PECTORIS: ICD-10-CM

## 2019-07-24 DIAGNOSIS — I25.10 CORONARY ARTERY DISEASE INVOLVING NATIVE HEART WITHOUT ANGINA PECTORIS, UNSPECIFIED VESSEL OR LESION TYPE: ICD-10-CM

## 2019-07-24 DIAGNOSIS — I10 HYPERTENSION, UNSPECIFIED TYPE: ICD-10-CM

## 2019-07-24 DIAGNOSIS — R60.9 EDEMA, UNSPECIFIED TYPE: ICD-10-CM

## 2019-07-24 RX ORDER — CLOPIDOGREL BISULFATE 75 MG/1
TABLET ORAL
Qty: 90 TABLET | Refills: 0 | Status: SHIPPED | OUTPATIENT
Start: 2019-07-24 | End: 2019-10-07 | Stop reason: SDUPTHER

## 2019-07-24 RX ORDER — METOPROLOL SUCCINATE 25 MG/1
TABLET, EXTENDED RELEASE ORAL
Qty: 180 TABLET | Refills: 0 | Status: SHIPPED | OUTPATIENT
Start: 2019-07-24 | End: 2019-11-18 | Stop reason: SDUPTHER

## 2019-07-24 RX ORDER — FUROSEMIDE 20 MG/1
TABLET ORAL
Qty: 90 TABLET | Refills: 0 | Status: SHIPPED | OUTPATIENT
Start: 2019-07-24 | End: 2019-09-23 | Stop reason: SDUPTHER

## 2019-07-24 RX ORDER — LISINOPRIL 5 MG/1
TABLET ORAL
Qty: 90 TABLET | Refills: 0 | Status: SHIPPED | OUTPATIENT
Start: 2019-07-24 | End: 2019-11-18 | Stop reason: SDUPTHER

## 2019-07-24 RX ORDER — ASPIRIN 81 MG/1
TABLET, CHEWABLE ORAL
Qty: 180 TABLET | Refills: 0 | Status: SHIPPED | OUTPATIENT
Start: 2019-07-24 | End: 2019-10-07 | Stop reason: SDUPTHER

## 2019-09-23 DIAGNOSIS — R60.9 EDEMA, UNSPECIFIED TYPE: ICD-10-CM

## 2019-09-23 DIAGNOSIS — I25.10 CORONARY ARTERY DISEASE INVOLVING NATIVE HEART WITHOUT ANGINA PECTORIS, UNSPECIFIED VESSEL OR LESION TYPE: ICD-10-CM

## 2019-09-23 RX ORDER — ISOSORBIDE MONONITRATE 60 MG/1
TABLET, EXTENDED RELEASE ORAL
Qty: 90 TABLET | Refills: 3 | Status: SHIPPED | OUTPATIENT
Start: 2019-09-23 | End: 2020-10-05 | Stop reason: SDUPTHER

## 2019-09-23 RX ORDER — FUROSEMIDE 20 MG/1
TABLET ORAL
Qty: 90 TABLET | Refills: 0 | Status: SHIPPED | OUTPATIENT
Start: 2019-09-23 | End: 2020-01-03 | Stop reason: SDUPTHER

## 2019-10-07 DIAGNOSIS — I25.10 CORONARY ARTERY DISEASE INVOLVING NATIVE HEART WITHOUT ANGINA PECTORIS, UNSPECIFIED VESSEL OR LESION TYPE: ICD-10-CM

## 2019-10-07 DIAGNOSIS — I25.10 CORONARY ARTERY DISEASE INVOLVING NATIVE CORONARY ARTERY OF NATIVE HEART WITHOUT ANGINA PECTORIS: ICD-10-CM

## 2019-10-08 RX ORDER — ASPIRIN 81 MG/1
TABLET, CHEWABLE ORAL
Qty: 180 TABLET | Refills: 0 | Status: SHIPPED | OUTPATIENT
Start: 2019-10-08 | End: 2020-10-05

## 2019-10-08 RX ORDER — CLOPIDOGREL BISULFATE 75 MG/1
TABLET ORAL
Qty: 90 TABLET | Refills: 0 | Status: SHIPPED | OUTPATIENT
Start: 2019-10-08 | End: 2020-01-03 | Stop reason: SDUPTHER

## 2019-10-14 ENCOUNTER — IN-CLINIC DEVICE VISIT (OUTPATIENT)
Dept: CARDIOLOGY CLINIC | Facility: CLINIC | Age: 54
End: 2019-10-14
Payer: COMMERCIAL

## 2019-10-14 ENCOUNTER — OFFICE VISIT (OUTPATIENT)
Dept: CARDIOLOGY CLINIC | Facility: CLINIC | Age: 54
End: 2019-10-14
Payer: COMMERCIAL

## 2019-10-14 VITALS
HEIGHT: 73 IN | OXYGEN SATURATION: 97 % | HEART RATE: 74 BPM | BODY MASS INDEX: 29.69 KG/M2 | SYSTOLIC BLOOD PRESSURE: 124 MMHG | WEIGHT: 224 LBS | DIASTOLIC BLOOD PRESSURE: 76 MMHG

## 2019-10-14 DIAGNOSIS — I10 BENIGN ESSENTIAL HYPERTENSION: Primary | ICD-10-CM

## 2019-10-14 DIAGNOSIS — E78.2 MIXED HYPERLIPIDEMIA: ICD-10-CM

## 2019-10-14 DIAGNOSIS — I47.2 NSVT (NONSUSTAINED VENTRICULAR TACHYCARDIA) (HCC): ICD-10-CM

## 2019-10-14 DIAGNOSIS — Z95.810 ICD (IMPLANTABLE CARDIOVERTER-DEFIBRILLATOR) IN PLACE: ICD-10-CM

## 2019-10-14 DIAGNOSIS — I25.5 ISCHEMIC CARDIOMYOPATHY: ICD-10-CM

## 2019-10-14 DIAGNOSIS — Z95.810 CARDIAC DEFIBRILLATOR IN PLACE: Primary | ICD-10-CM

## 2019-10-14 DIAGNOSIS — I25.10 ATHEROSCLEROSIS OF NATIVE CORONARY ARTERY OF NATIVE HEART WITHOUT ANGINA PECTORIS: ICD-10-CM

## 2019-10-14 DIAGNOSIS — I49.8 BRUGADA SYNDROME: ICD-10-CM

## 2019-10-14 DIAGNOSIS — I10 ESSENTIAL HYPERTENSION: ICD-10-CM

## 2019-10-14 PROCEDURE — 99214 OFFICE O/P EST MOD 30 MIN: CPT | Performed by: INTERNAL MEDICINE

## 2019-10-14 PROCEDURE — 93282 PRGRMG EVAL IMPLANTABLE DFB: CPT | Performed by: INTERNAL MEDICINE

## 2019-10-14 PROCEDURE — 3074F SYST BP LT 130 MM HG: CPT | Performed by: INTERNAL MEDICINE

## 2019-10-14 PROCEDURE — 3078F DIAST BP <80 MM HG: CPT | Performed by: INTERNAL MEDICINE

## 2019-10-14 NOTE — PROGRESS NOTES
Cardiology Follow Up    Grace Elder  1965  [id number]  [facility] [address] [address]    1  Benign essential hypertension     2  Ischemic cardiomyopathy     3  Brugada syndrome     4  Essential hypertension     5  NSVT (nonsustained ventricular tachycardia) ([facility])     6  ICD (implantable cardioverter-defibrillator) in place     7  Mixed hyperlipidemia     8  Atherosclerosis of native coronary artery of native heart without angina pectoris         Discussion/Summary:  Coronary artery disease stable with no complaints of angina functional capacity has been good  LV function has been stable on current medical therapy  No events on AICD  Regardless has been stable he had seen EP in the past   No further episodes of syncope  Blood pressure is well controlled  He is due for some basic blood work which will get in the coming weeks  Follow-up in 6-8 months    Interval History:     24-year-old gentleman with a history of coronary artery disease status post PCI of the circumflex, hypertension, hyperlipidemia, implantable defibrillator presents for a Follow-up visit overall he has been feeling better with more energy following our last visit  The episodes of lightheadedness and shortness of breath have improved but have not resolved  He denies any chest pain or discomfort there has been no palpitations, lightheadedness, syncope  He denies any lower extremity edema, PND, orthopnea  He underwent genetic testing which did not reveal any brugada syndrome  He has a stressful job and has had some shortness of breath with physical activity  Pulmonary function testing showed asthma  There has been no syncope  Recent 10 change went well  Overall he has been feeling well  He has no complaints in the office today    Denies any chest pain, shortness of breath, palpitations, lightheadedness, dizziness, or syncope  He has good functional capacity for his age  Blood pressures been well controlled  He has had no further episodes of lightheadedness or dizziness  When he avoid stress he does well    Problem List     Dyspnea on exertion    Dizziness    Brugada syndrome    Overview Addendum 4/18/2018  7:34 AM by Sai Yanez DO     Last Assessment & Plan:   Follows with device clinic  Ischemic cardiomyopathy    Coronary atherosclerosis    Overview Signed 4/18/2018  7:34 AM by Sai Yanez DO     Overview:   Cath @ [facility]: OM1 - 100% , unable to revascularize  LAD 30%, RCA 40%  EF: 40%    Last Assessment & Plan:   Continues with left sided chest discomfort  He will try to increase Imdur to 30mg TID to see if he can tolerate increased dosing  If unable will go back down to twice per day  Chest pain    MI (myocardial infarction) [facility])    Overview Signed 6/14/2017  6:51 PM by Yulissa Carreon PA-C     per pt report         ICD (implantable cardioverter-defibrillator) in place    Hypertension    Benign essential hypertension    Overview Signed 2/5/2018  7:56 AM by Sai Yanez DO     Last Assessment & Plan:   Controlled  Cardiomyopathy ([facility] [place] [address] )    Hyperlipidemia    NSVT (nonsustained ventricular tachycardia) ([facility] [place] [address] )    Pacemaker    Overview Signed 2/5/2018  7:56 AM by Sai Yanez DO     Last [address] [place] [address]:   Follows with device clinic         HFrEF (heart failure with reduced ejection fraction) ([facility] [place] [address] )    Overview Signed 4/18/2018  7:34 AM by Sai Yanez DO     Last Assessment & Plan:   NYHA Class II  Appears euvolemic  Will continue lasix use as this improved his symptoms  Will need to continue to up titrate his medications however due to continued chest discomfort will try an increase in Imdur first  If tolerated he will increase lisinopril to 10mg daily  BMP next week   We discussed the importance of daily weights, adherence to a 2gm sodium dietary restriction, s/s of heart failure and when to call our office  Past Medical History:   Diagnosis Date    Bradycardia     Brugada syndrome     Cardiac disease     Hyperlipidemia     Hypertension     ICD (implantable cardioverter-defibrillator) in place     MI (myocardial infarction) ([facility] [address]     per pt report    Pacemaker      Social History     Socioeconomic History    Marital status: /Civil Union     Spouse name: Not on file    Number of children: Not on file    Years of education: Not on file    Highest education level: Not on file   Occupational History    Not on file   Social Needs    Financial resource strain: Not on file    Food insecurity:     Worry: Not on file     Inability: Not on file    Transportation needs:     Medical: Not on file     Non-medical: Not on file   Tobacco Use    Smoking status: Former Smoker     Packs/day: 1      Years: 15 00     Pack years: 15      Types: Cigarettes     Last attempt to quit: 2006     Years since quittin 7    Smokeless tobacco: Never Used   Substance and Sexual Activity    Alcohol use:  Yes     Alcohol/week: 4 0 standard drinks     Types: 2 Glasses of wine, 2 Cans of beer per week     Comment: Social    Drug use: No    Sexual activity: Not on file   Lifestyle    Physical activity:     Days per week: Not on file     Minutes per session: Not on file    Stress: Not on file   Relationships    Social connections:     Talks on phone: Not on file     Gets together: Not on file     Attends Anabaptism service: Not on file     Active member of club or organization: Not on file     Attends meetings of clubs or organizations: Not on file     Relationship status: Not on file    Intimate partner violence:     Fear of current or ex partner: Not on file     Emotionally abused: Not on file     Physically abused: Not on file     Forced sexual activity: Not on file   Other Topics Concern    Not on file   Social History Narrative  Not on file      Family History   Problem Relation Age of Onset    Heart disease Mother     Heart attack Mother 36    Hypertension Mother     Arrhythmia Sister     Coronary artery disease Sister     Cancer Maternal Grandmother     Stroke Neg Hx     Anuerysm Neg Hx     Clotting disorder Neg Hx     Heart failure Neg Hx     Hyperlipidemia Neg Hx      Past Surgical History:   Procedure Laterality Date    CARDIAC CATHETERIZATION      CARDIAC DEFIBRILLATOR PLACEMENT      CARDIAC ICD GENERATOR CHANGE  9/19/2018    CARDIAC PACEMAKER PLACEMENT      LUMBAR FUSION      NO PAST SURGERIES         Current Outpatient Medications:     albuterol (PROAIR HFA) 90 mcg/act inhaler, Inhale 2 puffs every 6 (six) hours as needed for shortness of breath, Disp: 1 Inhaler, Rfl: 5    aspirin 81 mg chewable tablet, CHEW AND SWALLOW ONE TABLET TWICE DAILY, Disp: 180 tablet, Rfl: 0    clopidogrel (PLAVIX) 75 mg tablet, TAKE 1 TABLET BY MOUTH DAILY, Disp: 90 tablet, Rfl: 0    furosemide (LASIX) 20 mg tablet, TAKE 1 TABLET BY MOUTH DAILY, Disp: 90 tablet, Rfl: 0    isosorbide mononitrate (IMDUR) 60 mg 24 hr tablet, TAKE 1 TABLET BY MOUTH DAILY, Disp: 90 tablet, Rfl: 3    lisinopril (ZESTRIL) 5 mg tablet, TAKE 1 TABLET BY MOUTH DAILY, Disp: 90 tablet, Rfl: 0    metoprolol succinate (TOPROL-XL) 25 mg 24 hr tablet, TAKE 1 TABLET BY MOUTH TWICE DAILY, Disp: 180 tablet, Rfl: 0    nitroglycerin (NITROSTAT) 0 4 mg SL tablet, Place 1 tablet (0 4 mg total) under the tongue every 5 (five) minutes as needed for chest pain, Disp: 30 tablet, Rfl: 0    rosuvastatin (CRESTOR) 5 mg tablet, Take 1 tablet (5 mg total) by mouth daily, Disp: 90 tablet, Rfl: 2  No Known Allergies    Labs:     Chemistry        Component Value Date/Time     05/23/2017 1055    K 3 9 09/19/2018 1009    K 4 1 05/23/2017 1055     09/19/2018 1009     05/23/2017 1055    CO2 23 09/19/2018 1009    CO2 22 05/23/2017 1055    BUN 17 09/19/2018 1009 BUN 13 05/23/2017 1055    CREATININE 0 96 09/19/2018 1009    CREATININE 0 95 05/23/2017 1055        Component Value Date/Time    CALCIUM 8 4 09/19/2018 1009    CALCIUM 9 7 05/23/2017 1055    ALKPHOS 79 05/19/2018 1147    ALKPHOS 81 05/23/2017 1055    AST 31 05/19/2018 1147    AST 27 05/23/2017 1055    ALT 51 05/19/2018 1147    ALT 28 05/23/2017 1055    BILITOT 0 7 05/23/2017 1055            No results found for: CHOL  Lab Results   Component Value Date    HDL 30 (L) 05/20/2018    HDL 30 (L) 01/19/2018    HDL 33 (L) 06/15/2017     Lab Results   Component Value Date    LDLCALC 93 05/20/2018    LDLCALC 52 01/19/2018    LDLCALC 61 06/15/2017     Lab Results   Component Value Date    TRIG 150 05/20/2018    TRIG 157 (H) 01/19/2018    TRIG 63 06/15/2017     No results found for: CHOLHDL    Imaging: No results found  ECG:  Normal sinus rhythm right bundle-branch block      Review of Systems   Constitution: Negative  HENT: Negative  Eyes: Negative  Cardiovascular: Negative  Respiratory: Negative  Endocrine: Negative  Hematologic/Lymphatic: Negative  Skin: Negative  Musculoskeletal: Negative  Gastrointestinal: Negative  Genitourinary: Negative  Neurological: Negative  Psychiatric/Behavioral: Negative  Vitals:    10/14/19 1307   BP: 124/76   Pulse: 74   SpO2: 97%     Vitals:    10/14/19 1307   Weight: 102 kg (224 lb)     Height: 6' 1" (185 4 cm)   Body mass index is 29 55 kg/m²      Physical Exam:  General:  Alert and cooperative, appears stated age  HEENT:  PERRLA, EOMI, no scleral icterus, no conjunctival pallor  Neck:  No lymphadenopathy, no thyromegaly, no carotid bruits, no elevated JVP  Heart:  Regular rate and rhythm, normal S1/S2, no S3/S4, no murmur  Lungs:  Clear to auscultation bilaterally   Abdomen:  Soft, non-tender, positive bowel sounds, no rebound or guarding,   no organomegaly   Extremities:  No clubbing, cyanosis or edema   Vascular:  2+ pedal pulses  Skin:  No rashes or lesions on exposed skin  Neurologic:  Cranial nerves II-XII grossly intact without focal deficits

## 2019-10-14 NOTE — PROGRESS NOTES
Results for orders placed or performed in visit on 10/14/19   Cardiac EP device report    Narrative    MDT SINGLE ICD  DEVICE INTERROGATED IN THE [place] OFFICE: BATTERY VOLTAGE ADEQUATE (10 4 YRS)   <0 1%  ALL LEAD PARAMETERS WITHIN NORMAL LIMITS  ALL OTHER TESTING WITHIN NORMAL LIMITS  NO SIGNIFICANT HIGH RATE EPISODES  OPTI-VOL WITHIN NORMAL LIMITS  PT SEEN IN OFFICE BY DR Jose Cunha  NO PROGRAMMING CHANGES MADE TO DEVICE PARAMETERS  APPROPRIATELY FUNCTIONING ICD      EB

## 2019-11-18 DIAGNOSIS — I10 HYPERTENSION, UNSPECIFIED TYPE: ICD-10-CM

## 2019-11-18 RX ORDER — LISINOPRIL 5 MG/1
TABLET ORAL
Qty: 90 TABLET | Refills: 0 | Status: SHIPPED | OUTPATIENT
Start: 2019-11-18 | End: 2020-03-18

## 2019-11-18 RX ORDER — METOPROLOL SUCCINATE 25 MG/1
TABLET, EXTENDED RELEASE ORAL
Qty: 180 TABLET | Refills: 0 | Status: SHIPPED | OUTPATIENT
Start: 2019-11-18 | End: 2020-04-24

## 2019-11-22 ENCOUNTER — OFFICE VISIT (OUTPATIENT)
Dept: FAMILY MEDICINE CLINIC | Facility: CLINIC | Age: 54
End: 2019-11-22
Payer: COMMERCIAL

## 2019-11-22 VITALS
DIASTOLIC BLOOD PRESSURE: 72 MMHG | BODY MASS INDEX: 30.48 KG/M2 | HEART RATE: 75 BPM | HEIGHT: 73 IN | SYSTOLIC BLOOD PRESSURE: 110 MMHG | OXYGEN SATURATION: 96 % | WEIGHT: 230 LBS

## 2019-11-22 DIAGNOSIS — R22.1 NECK SWELLING: ICD-10-CM

## 2019-11-22 DIAGNOSIS — K14.8 TONGUE MASS: Primary | ICD-10-CM

## 2019-11-22 PROCEDURE — 99213 OFFICE O/P EST LOW 20 MIN: CPT | Performed by: FAMILY MEDICINE

## 2019-11-22 NOTE — ASSESSMENT & PLAN NOTE
3-4 mm mass under tongue, with painless ulcer, and mild swelling under left jaw  Patient does not have any systemic symptoms  Suggested an ultrasound of the neck, and referred to ENT  Patient will go to the ER if  any of the symptoms worsen progressively

## 2019-11-22 NOTE — PROGRESS NOTES
Subjective:      Patient ID: Aminata Florian is a 62 y o  male  HPI  The patient is here reporting a mass under his that he says is slowly increasing in size  Patient states he is not sure how long it is going on  Patient denies any symptoms of fever/chills/pain at the site  He does states that he has noticed a swelling under the left side of his jaw  Denies any symptoms of difficulty swallowing/lump in the throat  Patient has a remote history of smoking pack a day for 15 years  Patient states that he quit smoking when he was 25years old  Past Medical History:   Diagnosis Date    Bradycardia     Brugada syndrome     Cardiac disease     Hyperlipidemia     Hypertension     ICD (implantable cardioverter-defibrillator) in place     MI (myocardial infarction) ([facility] [address] )     per pt report    Pacemaker        Family History   Problem Relation Age of Onset    Heart disease Mother     Heart attack Mother 36    Hypertension Mother     Arrhythmia Sister     Coronary artery disease Sister     Cancer Maternal Grandmother     Stroke Neg Hx     Anuerysm Neg Hx     Clotting disorder Neg Hx     Heart failure Neg Hx     Hyperlipidemia Neg Hx        Past Surgical History:   Procedure Laterality Date    CARDIAC CATHETERIZATION      CARDIAC DEFIBRILLATOR PLACEMENT      CARDIAC ICD GENERATOR CHANGE  9/19/2018    CARDIAC PACEMAKER PLACEMENT      LUMBAR FUSION      NO PAST SURGERIES          reports that he quit smoking about 13 years ago  His smoking use included cigarettes  He has a 15 00 pack-year smoking history  He has never used smokeless tobacco  He reports that he drinks about 4 0 standard drinks of alcohol per week  He reports that he does not use drugs        Current Outpatient Medications:     albuterol (PROAIR HFA) 90 mcg/act inhaler, Inhale 2 puffs every 6 (six) hours as needed for shortness of breath, Disp: 1 Inhaler, Rfl: 5    aspirin 81 mg chewable tablet, CHEW AND SWALLOW ONE TABLET TWICE DAILY, Disp: 180 tablet, Rfl: 0    clopidogrel (PLAVIX) 75 mg tablet, TAKE 1 TABLET BY MOUTH DAILY, Disp: 90 tablet, Rfl: 0    furosemide (LASIX) 20 mg tablet, TAKE 1 TABLET BY MOUTH DAILY, Disp: 90 tablet, Rfl: 0    isosorbide mononitrate (IMDUR) 60 mg 24 hr tablet, TAKE 1 TABLET BY MOUTH DAILY, Disp: 90 tablet, Rfl: 3    lisinopril (ZESTRIL) 5 mg tablet, TAKE 1 TABLET BY MOUTH DAILY, Disp: 90 tablet, Rfl: 0    metoprolol succinate (TOPROL-XL) 25 mg 24 hr tablet, TAKE 1 TABLET BY MOUTH TWICE DAILY, Disp: 180 tablet, Rfl: 0    nitroglycerin (NITROSTAT) 0 4 mg SL tablet, Place 1 tablet (0 4 mg total) under the tongue every 5 (five) minutes as needed for chest pain, Disp: 30 tablet, Rfl: 0    rosuvastatin (CRESTOR) 5 mg tablet, Take 1 tablet (5 mg total) by mouth daily, Disp: 90 tablet, Rfl: 2    The following portions of the patient's history were reviewed and updated as appropriate: allergies, current medications, past family history, past medical history, past social history, past surgical history and problem list     Review of Systems   Constitutional: Negative  HENT: Positive for facial swelling (Under left jaw) and mouth sores (Under tongue)  Negative for congestion, dental problem, postnasal drip, rhinorrhea, sinus pressure, sinus pain, sneezing, sore throat, trouble swallowing and voice change  Respiratory: Negative  Cardiovascular: Negative  Gastrointestinal: Negative  Musculoskeletal: Negative  Negative for myalgias  Neurological: Negative  Psychiatric/Behavioral: Negative  Objective:    /72 (BP Location: Left arm, Patient Position: Sitting, Cuff Size: Large)   Pulse 75   Ht 6' 1" (1 854 m)   Wt 104 kg (230 lb)   SpO2 96%   BMI 30 34 kg/m²      Physical Exam   Constitutional: He is oriented to person, place, and time  He appears well-developed and well-nourished  HENT:   Mouth/Throat: No oropharyngeal exudate     3-4 mm mass under the tongue, with a small aphthous ulcer  Neck:   Soft Swelling under left jaw  Patient has no difficulty with jaw movements  Cardiovascular: Normal rate and regular rhythm  Pulmonary/Chest: Effort normal and breath sounds normal    Abdominal: Soft  Bowel sounds are normal    Lymphadenopathy:     He has no cervical adenopathy  Neurological: He is alert and oriented to person, place, and time  Psychiatric: His behavior is normal  Judgment normal          No results found for this or any previous visit (from the past 1008 hour(s))  Assessment/Plan:    No problem-specific Assessment & Plan notes found for this encounter  Problem List Items Addressed This Visit        Other    Tongue mass - Primary     3-4 mm mass under tongue, with painless ulcer, and mild swelling under left jaw  Patient does not have any systemic symptoms  Suggested an ultrasound of the neck, and referred to ENT  Patient will go to the ER if  any of the symptoms worsen progressively  Relevant Orders    US head neck soft tissue    Ambulatory Referral to Otolaryngology    Neck swelling    Relevant Orders    US head neck soft tissue    Ambulatory Referral to Otolaryngology          BMI Counseling: Body mass index is 30 34 kg/m²  The BMI is above normal  Nutrition recommendations include reducing portion sizes, decreasing overall calorie intake, 3-5 servings of fruits/vegetables daily, reducing fast food intake, consuming healthier snacks, decreasing soda and/or juice intake, moderation in carbohydrate intake, increasing intake of lean protein, reducing intake of saturated fat and trans fat and reducing intake of cholesterol  Exercise recommendations include moderate aerobic physical activity for 150 minutes/week

## 2019-11-25 ENCOUNTER — HOSPITAL ENCOUNTER (OUTPATIENT)
Dept: RADIOLOGY | Facility: HOSPITAL | Age: 54
Discharge: HOME | End: 2019-11-25
Payer: COMMERCIAL

## 2019-11-25 DIAGNOSIS — K14.8 TONGUE MASS: ICD-10-CM

## 2019-11-25 DIAGNOSIS — R22.1 NECK SWELLING: ICD-10-CM

## 2019-11-25 PROCEDURE — 76536 US EXAM OF HEAD AND NECK: CPT

## 2019-11-26 ENCOUNTER — OFFICE VISIT (OUTPATIENT)
Dept: OTOLARYNGOLOGY | Facility: CLINIC | Age: 54
End: 2019-11-26
Payer: COMMERCIAL

## 2019-11-26 VITALS
BODY MASS INDEX: 29.79 KG/M2 | WEIGHT: 224.8 LBS | HEIGHT: 73 IN | SYSTOLIC BLOOD PRESSURE: 108 MMHG | DIASTOLIC BLOOD PRESSURE: 72 MMHG

## 2019-11-26 DIAGNOSIS — K14.8 TONGUE MASS: ICD-10-CM

## 2019-11-26 DIAGNOSIS — R22.1 NECK SWELLING: ICD-10-CM

## 2019-11-26 DIAGNOSIS — Q67.0 FACIAL ASYMMETRY: Primary | ICD-10-CM

## 2019-11-26 DIAGNOSIS — K11.9: ICD-10-CM

## 2019-11-26 PROCEDURE — 99243 OFF/OP CNSLTJ NEW/EST LOW 30: CPT | Performed by: SPECIALIST

## 2019-11-26 NOTE — PROGRESS NOTES
Assessment/Plan:    Facial asymmetry  Asymmetry along left submandibular region  Reviewed recent US of neck with normal findings  Discussed possible causes of facial swelling or asymmetry including asymmetry of submandibular glands vs salivary stone      Disorder of salivary gland  On exam noted erythema of in between papilla near shahriar's duct of salivary gland  Reviewed mucosal changes and may be blocked minor salivary gland vs worrisome processes  Options include watchful monitoring, medications, imaging, in office excision of area, vs surgical interventions  Discussed sialendoscopy vs in office excision of area in detail  Reviewed clinical trial options, OncAlert  After discussion agreed to   Follow up in 4 to 6 weeks to monitor         Diagnoses and all orders for this visit:    Facial asymmetry    Disorder of salivary gland    Tongue mass  -     Ambulatory Referral to Otolaryngology    Neck swelling  -     Ambulatory Referral to Otolaryngology          Subjective:      Patient ID: Beau Valentin is a 62 y o  male  Presents today as a new patient consultation per Dr Lenard Gutierrez due to oral mass  About one month ago noticed pain inside mouth  Reports pain and facial swelling under jaw  Recent US neck  The following portions of the patient's history were reviewed and updated as appropriate: allergies, current medications, past family history, past medical history, past social history, past surgical history and problem list     Review of Systems   Constitutional: Negative  HENT: Positive for facial swelling and mouth sores  Negative for congestion, ear discharge, ear pain, hearing loss, nosebleeds, postnasal drip, rhinorrhea, sinus pressure, sinus pain, sore throat, tinnitus and voice change  Eyes: Negative  Respiratory: Negative for chest tightness and shortness of breath  Cardiovascular: Negative  Gastrointestinal: Negative  Endocrine: Negative      Musculoskeletal: Negative  Skin: Negative for color change  Neurological: Negative for dizziness, numbness and headaches  Psychiatric/Behavioral: Negative  Objective:      /72 (BP Location: Right arm, Patient Position: Sitting, Cuff Size: Adult)   Ht 6' 1" (1 854 m)   Wt 102 kg (224 lb 12 8 oz)   BMI 29 66 kg/m²          Physical Exam   Constitutional: He is oriented to person, place, and time  He appears well-developed and well-nourished  He is cooperative  HENT:   Head: Normocephalic  Right Ear: Hearing, tympanic membrane, external ear and ear canal normal  No drainage or tenderness  Tympanic membrane is not perforated and not erythematous  No decreased hearing is noted  Left Ear: Hearing, tympanic membrane, external ear and ear canal normal  No drainage or tenderness  Tympanic membrane is not perforated and not erythematous  No decreased hearing is noted  Nose: Nose normal  No sinus tenderness, nasal deformity or septal deviation  Mouth/Throat: Uvula is midline, oropharynx is clear and moist and mucous membranes are normal  Mucous membranes are not pale and not dry  No oral lesions  Normal dentition  No oropharyngeal exudate  DNS to right  Slight erythema, normal structure, in between papilla of shahriar duct     Neck: Normal range of motion and full passive range of motion without pain  Neck supple  No tracheal deviation present  Asymmetry and facial swelling below mandible, along submandibular region on left     Cardiovascular: Normal rate  Pulmonary/Chest: Effort normal  No accessory muscle usage  No respiratory distress  Musculoskeletal:        Right shoulder: He exhibits normal range of motion  Lymphadenopathy:     He has no cervical adenopathy  Neurological: He is alert and oriented to person, place, and time  No cranial nerve deficit or sensory deficit  Skin: Skin is warm, dry and intact  Psychiatric: He has a normal mood and affect         Scribe Attestation    I,:   Brunswick Td Sebastian am acting as a scribe while in the presence of the attending physician :        I,:   Jaun Vega MD personally performed the services described in this documentation    as scribed in my presence :

## 2019-11-26 NOTE — ASSESSMENT & PLAN NOTE
On exam noted erythema of in between papilla near shahriar's duct of salivary gland  Reviewed mucosal changes and may be blocked minor salivary gland vs worrisome processes  Options include watchful monitoring, medications, imaging, in office excision of area, vs surgical interventions  Discussed sialendoscopy vs in office excision of area in detail  Reviewed clinical trial options, OncAlert     After discussion agreed to   Follow up in 4 to 6 weeks to monitor

## 2019-11-26 NOTE — ASSESSMENT & PLAN NOTE
Asymmetry along left submandibular region  Reviewed recent US of neck with normal findings      Discussed possible causes of facial swelling or asymmetry including asymmetry of submandibular glands vs salivary stone

## 2020-01-03 DIAGNOSIS — R60.9 EDEMA, UNSPECIFIED TYPE: ICD-10-CM

## 2020-01-03 DIAGNOSIS — I25.10 CORONARY ARTERY DISEASE INVOLVING NATIVE HEART WITHOUT ANGINA PECTORIS, UNSPECIFIED VESSEL OR LESION TYPE: ICD-10-CM

## 2020-01-03 RX ORDER — CLOPIDOGREL BISULFATE 75 MG/1
75 TABLET ORAL DAILY
Qty: 90 TABLET | Refills: 2 | Status: SHIPPED | OUTPATIENT
Start: 2020-01-03 | End: 2020-01-06 | Stop reason: SDUPTHER

## 2020-01-03 RX ORDER — FUROSEMIDE 20 MG/1
20 TABLET ORAL DAILY
Qty: 90 TABLET | Refills: 2 | Status: SHIPPED | OUTPATIENT
Start: 2020-01-03 | End: 2020-01-06 | Stop reason: SDUPTHER

## 2020-01-06 DIAGNOSIS — R60.9 EDEMA, UNSPECIFIED TYPE: ICD-10-CM

## 2020-01-06 DIAGNOSIS — I25.10 CORONARY ARTERY DISEASE INVOLVING NATIVE HEART WITHOUT ANGINA PECTORIS, UNSPECIFIED VESSEL OR LESION TYPE: ICD-10-CM

## 2020-01-07 RX ORDER — CLOPIDOGREL BISULFATE 75 MG/1
75 TABLET ORAL DAILY
Qty: 90 TABLET | Refills: 2 | Status: SHIPPED | OUTPATIENT
Start: 2020-01-07 | End: 2020-01-10 | Stop reason: SDUPTHER

## 2020-01-07 RX ORDER — FUROSEMIDE 20 MG/1
20 TABLET ORAL DAILY
Qty: 90 TABLET | Refills: 2 | Status: SHIPPED | OUTPATIENT
Start: 2020-01-07 | End: 2020-01-10 | Stop reason: SDUPTHER

## 2020-01-10 DIAGNOSIS — I25.10 CORONARY ARTERY DISEASE INVOLVING NATIVE HEART WITHOUT ANGINA PECTORIS, UNSPECIFIED VESSEL OR LESION TYPE: ICD-10-CM

## 2020-01-10 DIAGNOSIS — R60.9 EDEMA, UNSPECIFIED TYPE: ICD-10-CM

## 2020-01-10 RX ORDER — FUROSEMIDE 20 MG/1
20 TABLET ORAL DAILY
Qty: 90 TABLET | Refills: 2 | Status: SHIPPED | OUTPATIENT
Start: 2020-01-10 | End: 2020-02-05

## 2020-01-10 RX ORDER — CLOPIDOGREL BISULFATE 75 MG/1
75 TABLET ORAL DAILY
Qty: 90 TABLET | Refills: 2 | Status: SHIPPED | OUTPATIENT
Start: 2020-01-10 | End: 2020-02-11 | Stop reason: SDUPTHER

## 2020-01-20 ENCOUNTER — REMOTE DEVICE CLINIC VISIT (OUTPATIENT)
Dept: CARDIOLOGY CLINIC | Facility: CLINIC | Age: 55
End: 2020-01-20
Payer: COMMERCIAL

## 2020-01-20 DIAGNOSIS — Z95.810 PRESENCE OF IMPLANTABLE CARDIOVERTER-DEFIBRILLATOR (ICD): Primary | ICD-10-CM

## 2020-01-20 PROCEDURE — 93296 REM INTERROG EVL PM/IDS: CPT | Performed by: INTERNAL MEDICINE

## 2020-01-20 PROCEDURE — 93295 DEV INTERROG REMOTE 1/2/MLT: CPT | Performed by: INTERNAL MEDICINE

## 2020-01-20 NOTE — PROGRESS NOTES
MDT SINGLE ICD  [employer] TRANSMISSION:  BATTERY VOLTAGE ADEQUATE (10 2 YR)    0%   ALL LEAD PARAMETERS WITHIN NORMAL LIMITS   NO HIGH RATE EPISODES   OPTI-VOL WITHIN NORMAL LIMITS   NORMAL DEVICE FUNCTION   RG

## 2020-02-05 DIAGNOSIS — R60.9 EDEMA, UNSPECIFIED TYPE: ICD-10-CM

## 2020-02-05 RX ORDER — FUROSEMIDE 20 MG/1
TABLET ORAL
Qty: 90 TABLET | Refills: 0 | Status: SHIPPED | OUTPATIENT
Start: 2020-02-05 | End: 2021-01-22 | Stop reason: SDUPTHER

## 2020-02-11 DIAGNOSIS — I25.10 CORONARY ARTERY DISEASE INVOLVING NATIVE HEART WITHOUT ANGINA PECTORIS, UNSPECIFIED VESSEL OR LESION TYPE: ICD-10-CM

## 2020-02-11 RX ORDER — CLOPIDOGREL BISULFATE 75 MG/1
75 TABLET ORAL DAILY
Qty: 90 TABLET | Refills: 2 | Status: SHIPPED | OUTPATIENT
Start: 2020-02-11 | End: 2021-02-23 | Stop reason: SDUPTHER

## 2020-03-17 DIAGNOSIS — I10 HYPERTENSION, UNSPECIFIED TYPE: ICD-10-CM

## 2020-03-18 RX ORDER — LISINOPRIL 5 MG/1
TABLET ORAL
Qty: 90 TABLET | Refills: 0 | Status: SHIPPED | OUTPATIENT
Start: 2020-03-18 | End: 2020-07-01

## 2020-04-20 ENCOUNTER — TELEMEDICINE (OUTPATIENT)
Dept: CARDIOLOGY CLINIC | Facility: CLINIC | Age: 55
End: 2020-04-20
Payer: COMMERCIAL

## 2020-04-20 ENCOUNTER — REMOTE DEVICE CLINIC VISIT (OUTPATIENT)
Dept: CARDIOLOGY CLINIC | Facility: CLINIC | Age: 55
End: 2020-04-20
Payer: COMMERCIAL

## 2020-04-20 VITALS — HEIGHT: 73 IN | BODY MASS INDEX: 29.69 KG/M2 | WEIGHT: 224 LBS

## 2020-04-20 DIAGNOSIS — Z95.810 AICD (AUTOMATIC CARDIOVERTER/DEFIBRILLATOR) PRESENT: Primary | ICD-10-CM

## 2020-04-20 DIAGNOSIS — I25.5 ISCHEMIC CARDIOMYOPATHY: ICD-10-CM

## 2020-04-20 DIAGNOSIS — Z95.0 PACEMAKER: ICD-10-CM

## 2020-04-20 DIAGNOSIS — I47.2 NSVT (NONSUSTAINED VENTRICULAR TACHYCARDIA) (HCC): ICD-10-CM

## 2020-04-20 DIAGNOSIS — R06.02 SOB (SHORTNESS OF BREATH) ON EXERTION: ICD-10-CM

## 2020-04-20 DIAGNOSIS — I49.8 BRUGADA SYNDROME: ICD-10-CM

## 2020-04-20 DIAGNOSIS — Z95.810 ICD (IMPLANTABLE CARDIOVERTER-DEFIBRILLATOR) IN PLACE: ICD-10-CM

## 2020-04-20 DIAGNOSIS — I10 BENIGN ESSENTIAL HYPERTENSION: ICD-10-CM

## 2020-04-20 DIAGNOSIS — I25.10 ATHEROSCLEROSIS OF NATIVE CORONARY ARTERY OF NATIVE HEART WITHOUT ANGINA PECTORIS: Primary | ICD-10-CM

## 2020-04-20 PROCEDURE — 93296 REM INTERROG EVL PM/IDS: CPT | Performed by: INTERNAL MEDICINE

## 2020-04-20 PROCEDURE — 99213 OFFICE O/P EST LOW 20 MIN: CPT | Performed by: INTERNAL MEDICINE

## 2020-04-20 PROCEDURE — 3008F BODY MASS INDEX DOCD: CPT | Performed by: INTERNAL MEDICINE

## 2020-04-20 PROCEDURE — 93295 DEV INTERROG REMOTE 1/2/MLT: CPT | Performed by: INTERNAL MEDICINE

## 2020-04-24 DIAGNOSIS — I10 HYPERTENSION, UNSPECIFIED TYPE: ICD-10-CM

## 2020-04-24 RX ORDER — METOPROLOL SUCCINATE 25 MG/1
TABLET, EXTENDED RELEASE ORAL
Qty: 180 TABLET | Refills: 0 | Status: SHIPPED | OUTPATIENT
Start: 2020-04-24 | End: 2020-08-25

## 2020-07-01 DIAGNOSIS — I10 HYPERTENSION, UNSPECIFIED TYPE: ICD-10-CM

## 2020-07-01 RX ORDER — LISINOPRIL 5 MG/1
TABLET ORAL
Qty: 90 TABLET | Refills: 0 | Status: SHIPPED | OUTPATIENT
Start: 2020-07-01 | End: 2020-07-15 | Stop reason: SDUPTHER

## 2020-07-15 DIAGNOSIS — I10 HYPERTENSION, UNSPECIFIED TYPE: ICD-10-CM

## 2020-07-15 RX ORDER — LISINOPRIL 2.5 MG/1
5 TABLET ORAL DAILY
Qty: 60 TABLET | Refills: 0 | Status: SHIPPED | OUTPATIENT
Start: 2020-07-15 | End: 2020-08-16

## 2020-08-15 DIAGNOSIS — I10 HYPERTENSION, UNSPECIFIED TYPE: ICD-10-CM

## 2020-08-16 RX ORDER — LISINOPRIL 2.5 MG/1
TABLET ORAL
Qty: 60 TABLET | Refills: 0 | Status: SHIPPED | OUTPATIENT
Start: 2020-08-16 | End: 2020-10-15 | Stop reason: SDUPTHER

## 2020-08-22 DIAGNOSIS — I25.10 ATHEROSCLEROSIS OF NATIVE CORONARY ARTERY OF NATIVE HEART WITHOUT ANGINA PECTORIS: ICD-10-CM

## 2020-08-22 DIAGNOSIS — I25.5 ISCHEMIC CARDIOMYOPATHY: ICD-10-CM

## 2020-08-24 DIAGNOSIS — I10 HYPERTENSION, UNSPECIFIED TYPE: ICD-10-CM

## 2020-08-24 RX ORDER — ROSUVASTATIN CALCIUM 5 MG/1
TABLET, COATED ORAL
Qty: 90 TABLET | Refills: 2 | Status: SHIPPED | OUTPATIENT
Start: 2020-08-24

## 2020-08-25 RX ORDER — METOPROLOL SUCCINATE 25 MG/1
TABLET, EXTENDED RELEASE ORAL
Qty: 180 TABLET | Refills: 0 | Status: SHIPPED | OUTPATIENT
Start: 2020-08-25 | End: 2021-02-10 | Stop reason: SDUPTHER

## 2020-10-05 DIAGNOSIS — I25.10 CORONARY ARTERY DISEASE INVOLVING NATIVE CORONARY ARTERY OF NATIVE HEART WITHOUT ANGINA PECTORIS: ICD-10-CM

## 2020-10-05 DIAGNOSIS — I25.10 CORONARY ARTERY DISEASE INVOLVING NATIVE HEART WITHOUT ANGINA PECTORIS, UNSPECIFIED VESSEL OR LESION TYPE: ICD-10-CM

## 2020-10-05 RX ORDER — ASPIRIN 81 MG/1
81 TABLET, CHEWABLE ORAL DAILY
Qty: 90 TABLET | Refills: 3 | Status: SHIPPED | OUTPATIENT
Start: 2020-10-05

## 2020-10-05 RX ORDER — ISOSORBIDE MONONITRATE 60 MG/1
60 TABLET, EXTENDED RELEASE ORAL DAILY
Qty: 90 TABLET | Refills: 3 | Status: SHIPPED | OUTPATIENT
Start: 2020-10-05 | End: 2020-10-09

## 2020-10-09 DIAGNOSIS — I25.10 CORONARY ARTERY DISEASE INVOLVING NATIVE HEART WITHOUT ANGINA PECTORIS, UNSPECIFIED VESSEL OR LESION TYPE: ICD-10-CM

## 2020-10-09 RX ORDER — ISOSORBIDE MONONITRATE 60 MG/1
60 TABLET, EXTENDED RELEASE ORAL DAILY
Qty: 90 TABLET | Refills: 3 | Status: SHIPPED | OUTPATIENT
Start: 2020-10-09

## 2020-10-15 DIAGNOSIS — I10 HYPERTENSION, UNSPECIFIED TYPE: ICD-10-CM

## 2020-10-15 RX ORDER — LISINOPRIL 2.5 MG/1
5 TABLET ORAL DAILY
Qty: 180 TABLET | Refills: 3 | Status: SHIPPED | OUTPATIENT
Start: 2020-10-15

## 2020-12-02 ENCOUNTER — TELEPHONE (OUTPATIENT)
Dept: CARDIOLOGY CLINIC | Facility: CLINIC | Age: 55
End: 2020-12-02

## 2021-01-22 DIAGNOSIS — R60.9 EDEMA, UNSPECIFIED TYPE: ICD-10-CM

## 2021-01-22 RX ORDER — FUROSEMIDE 20 MG/1
20 TABLET ORAL DAILY
Qty: 90 TABLET | Refills: 3 | Status: SHIPPED | OUTPATIENT
Start: 2021-01-22 | End: 2021-01-27 | Stop reason: SDUPTHER

## 2021-01-27 DIAGNOSIS — R60.9 EDEMA, UNSPECIFIED TYPE: ICD-10-CM

## 2021-01-27 RX ORDER — FUROSEMIDE 20 MG/1
20 TABLET ORAL DAILY
Qty: 90 TABLET | Refills: 3 | Status: SHIPPED | OUTPATIENT
Start: 2021-01-27 | End: 2021-02-04 | Stop reason: SDUPTHER

## 2021-02-04 DIAGNOSIS — R60.9 EDEMA, UNSPECIFIED TYPE: ICD-10-CM

## 2021-02-04 RX ORDER — FUROSEMIDE 20 MG/1
20 TABLET ORAL DAILY
Qty: 90 TABLET | Refills: 3 | Status: SHIPPED | OUTPATIENT
Start: 2021-02-04

## 2021-02-10 DIAGNOSIS — I10 HYPERTENSION, UNSPECIFIED TYPE: ICD-10-CM

## 2021-02-10 RX ORDER — METOPROLOL SUCCINATE 25 MG/1
25 TABLET, EXTENDED RELEASE ORAL 2 TIMES DAILY
Qty: 180 TABLET | Refills: 3 | Status: SHIPPED | OUTPATIENT
Start: 2021-02-10 | End: 2021-02-23 | Stop reason: SDUPTHER

## 2021-02-18 ENCOUNTER — OFFICE VISIT (OUTPATIENT)
Age: 56
End: 2021-02-18

## 2021-02-23 DIAGNOSIS — I10 HYPERTENSION, UNSPECIFIED TYPE: ICD-10-CM

## 2021-02-23 DIAGNOSIS — I25.10 CORONARY ARTERY DISEASE INVOLVING NATIVE HEART WITHOUT ANGINA PECTORIS, UNSPECIFIED VESSEL OR LESION TYPE: ICD-10-CM

## 2021-02-23 RX ORDER — CLOPIDOGREL BISULFATE 75 MG/1
75 TABLET ORAL DAILY
Qty: 90 TABLET | Refills: 3 | Status: SHIPPED | OUTPATIENT
Start: 2021-02-23

## 2021-02-23 RX ORDER — METOPROLOL SUCCINATE 25 MG/1
25 TABLET, EXTENDED RELEASE ORAL 2 TIMES DAILY
Qty: 180 TABLET | Refills: 3 | Status: SHIPPED | OUTPATIENT
Start: 2021-02-23

## 2021-03-01 ENCOUNTER — OFFICE VISIT (OUTPATIENT)
Dept: URBAN - METROPOLITAN AREA CLINIC 7 | Facility: CLINIC | Age: 56
End: 2021-03-01

## 2021-03-03 ENCOUNTER — TELEPHONE ENCOUNTER (OUTPATIENT)
Dept: URBAN - METROPOLITAN AREA CLINIC 9 | Facility: CLINIC | Age: 56
End: 2021-03-03

## 2021-03-03 ENCOUNTER — TELEPHONE (OUTPATIENT)
Dept: CARDIOLOGY CLINIC | Facility: CLINIC | Age: 56
End: 2021-03-03

## 2021-03-03 NOTE — TELEPHONE ENCOUNTER
Received a fax for upcoming colonoscopy in [facility]  Per previous note pt now resides in [facility]  Advised the GI office in [facility] pt will need to be seen prior to clearing

## 2021-06-22 ENCOUNTER — TELEPHONE ENCOUNTER (OUTPATIENT)
Dept: URBAN - METROPOLITAN AREA CLINIC 9 | Facility: CLINIC | Age: 56
End: 2021-06-22

## 2021-06-25 ENCOUNTER — VBI (OUTPATIENT)
Dept: ADMINISTRATIVE | Facility: OTHER | Age: 56
End: 2021-06-25

## 2022-07-30 ENCOUNTER — TELEPHONE ENCOUNTER (OUTPATIENT)
Age: 57
End: 2022-07-30

## 2022-07-31 ENCOUNTER — TELEPHONE ENCOUNTER (OUTPATIENT)
Age: 57
End: 2022-07-31

## 2022-10-06 ENCOUNTER — OFFICE VISIT (OUTPATIENT)
Dept: URBAN - METROPOLITAN AREA CLINIC 7 | Facility: CLINIC | Age: 57
End: 2022-10-06

## 2023-01-27 ENCOUNTER — WEB ENCOUNTER (OUTPATIENT)
Dept: URBAN - METROPOLITAN AREA CLINIC 7 | Facility: CLINIC | Age: 58
End: 2023-01-27

## 2023-01-27 ENCOUNTER — OFFICE VISIT (OUTPATIENT)
Dept: URBAN - METROPOLITAN AREA CLINIC 7 | Facility: CLINIC | Age: 58
End: 2023-01-27
Payer: COMMERCIAL

## 2023-01-27 ENCOUNTER — DASHBOARD ENCOUNTERS (OUTPATIENT)
Age: 58
End: 2023-01-27

## 2023-01-27 VITALS
WEIGHT: 210 LBS | HEIGHT: 73 IN | BODY MASS INDEX: 27.83 KG/M2 | DIASTOLIC BLOOD PRESSURE: 62 MMHG | SYSTOLIC BLOOD PRESSURE: 114 MMHG | TEMPERATURE: 97.7 F

## 2023-01-27 DIAGNOSIS — I25.10 CORONARY ARTERY DISEASE, UNSPECIFIED VESSEL OR LESION TYPE, UNSPECIFIED WHETHER ANGINA PRESENT, UNSPECIFIED WHETHER NATIVE OR TRANSPLANTED HEART: ICD-10-CM

## 2023-01-27 DIAGNOSIS — R10.9 RIGHT SIDED ABDOMINAL PAIN: ICD-10-CM

## 2023-01-27 DIAGNOSIS — Z95.810 CARDIAC DEFIBRILLATOR IN PLACE: ICD-10-CM

## 2023-01-27 DIAGNOSIS — Z86.010 HISTORY OF COLON POLYPS: ICD-10-CM

## 2023-01-27 PROBLEM — 285388000 RIGHT SIDED ABDOMINAL PAIN: Status: ACTIVE | Noted: 2023-01-27

## 2023-01-27 PROBLEM — 428283002: Status: ACTIVE | Noted: 2023-01-26

## 2023-01-27 PROBLEM — 451041000124103 ATHEROSCLEROTIC HEART DISEASE OF NATIVE CORONARY ARTERY WITHOUT ANGINA PECTORIS: Status: ACTIVE | Noted: 2023-01-26

## 2023-01-27 PROCEDURE — 99214 OFFICE O/P EST MOD 30 MIN: CPT | Performed by: INTERNAL MEDICINE

## 2023-01-27 RX ORDER — FUROSEMIDE 20 MG/1
TAKE 1 TABLET BY MOUTH DAILY TABLET ORAL ONCE A DAY
Refills: 0 | Status: ACTIVE | COMMUNITY

## 2023-01-27 RX ORDER — ISOSORBIDE MONONITRATE 60 MG/1
TAKE 1 TABLET BY MOUTH IN THE MORNING TABLET, EXTENDED RELEASE ORAL ONCE A DAY
Refills: 0 | Status: ACTIVE | COMMUNITY

## 2023-01-27 RX ORDER — CLOPIDOGREL BISULFATE 75 MG/1
1 TABLET TABLET ORAL ONCE A DAY
Status: ACTIVE | COMMUNITY

## 2023-01-27 RX ORDER — METOPROLOL SUCCINATE 50 MG/1
TAKE 1 TABLET BY MOUTH IN THE MORNING TABLET, EXTENDED RELEASE ORAL ONCE A DAY
Refills: 0 | Status: ACTIVE | COMMUNITY

## 2023-01-27 RX ORDER — LISINOPRIL 5 MG/1
1 TABLET TABLET ORAL ONCE A DAY
Qty: 90 TABLET | Refills: 0 | Status: ACTIVE | COMMUNITY

## 2023-01-27 RX ORDER — ROSUVASTATIN CALCIUM 5 MG/1
TAKE 1 TABLET BY MOUTH IN THE EVENING TABLET, FILM COATED ORAL ONCE A DAY
Refills: 0 | Status: ACTIVE | COMMUNITY

## 2023-01-27 RX ORDER — NITROGLYCERIN 0.4 MG/1
AS DIRECTED TABLET SUBLINGUAL
Status: ACTIVE | COMMUNITY

## 2023-01-27 RX ORDER — LEVALBUTEROL TARTRATE 45 UG/1
1 PUFF AS NEEDED AEROSOL, METERED ORAL
Refills: 0 | Status: ACTIVE | COMMUNITY

## 2023-01-27 NOTE — HPI-TODAY'S VISIT:
Patient was last seen in the office in March 2021.  I did evaluate him at that point for possible colonoscopy.  He has a history of coronary artery disease with stents and on Plavix.  Has had a prior colonoscopy sometime ago with a history of polyps.  No first-degree relatives with a history of colon cancer.  He did not have any symptoms at that point including no GI bleeding no nausea no vomiting no abdominal pain.  No weight loss.  At the time of his last visit his stents had been placed about a year and a half prior, so in late 2019 or 2020.  Does have a defibrillator in place which was placed in 2012. CBC/CMP in 2021 were normal. He to discuss his colonoscopy. Has been having some pain on the right side with eating nuts, but not persistent, intermittent. Saw urology for prostate issue, shooting. Bowel habits are good, eats fiber. No bleeding. No alarm symptoms. He lost 20 lbs intentionally since last visit. He is still on clopidogrel.

## 2023-02-03 ENCOUNTER — LAB OUTSIDE AN ENCOUNTER (OUTPATIENT)
Dept: URBAN - METROPOLITAN AREA CLINIC 7 | Facility: CLINIC | Age: 58
End: 2023-02-03

## 2023-02-23 ENCOUNTER — TELEPHONE ENCOUNTER (OUTPATIENT)
Dept: URBAN - METROPOLITAN AREA CLINIC 7 | Facility: CLINIC | Age: 58
End: 2023-02-23

## 2023-03-21 PROBLEM — 428283002 HISTORY OF POLYP OF COLON: Status: ACTIVE | Noted: 2023-03-21

## 2023-03-31 ENCOUNTER — CLAIMS CREATED FROM THE CLAIM WINDOW (OUTPATIENT)
Dept: URBAN - METROPOLITAN AREA CLINIC 4 | Facility: CLINIC | Age: 58
End: 2023-03-31
Payer: COMMERCIAL

## 2023-03-31 ENCOUNTER — OFFICE VISIT (OUTPATIENT)
Dept: URBAN - METROPOLITAN AREA SURGERY CENTER 5 | Facility: SURGERY CENTER | Age: 58
End: 2023-03-31
Payer: COMMERCIAL

## 2023-03-31 ENCOUNTER — TELEPHONE ENCOUNTER (OUTPATIENT)
Dept: URBAN - METROPOLITAN AREA CLINIC 7 | Facility: CLINIC | Age: 58
End: 2023-03-31

## 2023-03-31 DIAGNOSIS — D12.2 BENIGN NEOPLASM OF ASCENDING COLON: ICD-10-CM

## 2023-03-31 DIAGNOSIS — Z12.11 COLON CANCER SCREENING: ICD-10-CM

## 2023-03-31 DIAGNOSIS — K64.8 EXTERNAL HEMORRHOIDS: ICD-10-CM

## 2023-03-31 DIAGNOSIS — K64.4 ANAL SKIN TAG: ICD-10-CM

## 2023-03-31 DIAGNOSIS — K63.5 BENIGN COLON POLYP: ICD-10-CM

## 2023-03-31 DIAGNOSIS — K57.30 ACQUIRED DIVERTICULOSIS OF COLON: ICD-10-CM

## 2023-03-31 PROBLEM — 70342003: Status: ACTIVE | Noted: 2023-03-31

## 2023-03-31 PROCEDURE — 45385 COLONOSCOPY W/LESION REMOVAL: CPT | Performed by: INTERNAL MEDICINE

## 2023-03-31 PROCEDURE — 88305 TISSUE EXAM BY PATHOLOGIST: CPT | Performed by: PATHOLOGY

## 2023-03-31 RX ORDER — ROSUVASTATIN CALCIUM 5 MG/1
TAKE 1 TABLET BY MOUTH IN THE EVENING TABLET, FILM COATED ORAL ONCE A DAY
Refills: 0 | Status: ACTIVE | COMMUNITY

## 2023-03-31 RX ORDER — CLOPIDOGREL BISULFATE 75 MG/1
1 TABLET TABLET ORAL ONCE A DAY
Status: ACTIVE | COMMUNITY

## 2023-03-31 RX ORDER — METOPROLOL SUCCINATE 50 MG/1
TAKE 1 TABLET BY MOUTH IN THE MORNING TABLET, EXTENDED RELEASE ORAL ONCE A DAY
Refills: 0 | Status: ACTIVE | COMMUNITY

## 2023-03-31 RX ORDER — LEVALBUTEROL TARTRATE 45 UG/1
1 PUFF AS NEEDED AEROSOL, METERED ORAL
Refills: 0 | Status: ACTIVE | COMMUNITY

## 2023-03-31 RX ORDER — ISOSORBIDE MONONITRATE 60 MG/1
TAKE 1 TABLET BY MOUTH IN THE MORNING TABLET, EXTENDED RELEASE ORAL ONCE A DAY
Refills: 0 | Status: ACTIVE | COMMUNITY

## 2023-03-31 RX ORDER — NITROGLYCERIN 0.4 MG/1
AS DIRECTED TABLET SUBLINGUAL
Status: ACTIVE | COMMUNITY

## 2023-03-31 RX ORDER — FUROSEMIDE 20 MG/1
TAKE 1 TABLET BY MOUTH DAILY TABLET ORAL ONCE A DAY
Refills: 0 | Status: ACTIVE | COMMUNITY

## 2023-03-31 RX ORDER — LISINOPRIL 5 MG/1
1 TABLET TABLET ORAL ONCE A DAY
Qty: 90 TABLET | Refills: 0 | Status: ACTIVE | COMMUNITY